# Patient Record
Sex: FEMALE | Race: WHITE | NOT HISPANIC OR LATINO | URBAN - METROPOLITAN AREA
[De-identification: names, ages, dates, MRNs, and addresses within clinical notes are randomized per-mention and may not be internally consistent; named-entity substitution may affect disease eponyms.]

---

## 2018-12-05 PROBLEM — R50.9 FEVER: Status: ACTIVE | Noted: 2018-12-05

## 2018-12-05 NOTE — PLAN OF CARE
[facility] Patient Flow Center Transfer Acceptance Note      Transferring Facility/Hospital: [facility]       Referring Provider/Specialty giving report:   12/4 - Dr. Malik Varghese  - [facility] med  12/5 - Dr. Kimbrough - [facility] med   12/ 5 - Dr. Baptiste - general surgery      Accepting Physician for admission to hospital: Ricardo Cha MD    Date of acceptance:  12/5/2018    Patients name: Silvia Ornelas       Allergies:Review of patient's allergies indicates:  Allergies not on file     Reason for transfer:  Higher level of care - ID and Gen Surg consultations     Overview/ Report from Physician/Mid-Level Provider:    HPI:  69 y/o Woman, Dementia vs MR?  Nursing Home resident, past hx of multiple abdominal surgeries.   Patient presented on 11/18 with small bowel obstruction, treated conservatively, multiple abdominal surgeries with hx of adhesions, had NG tube tube placed and reported that bowel obstruction resolved with conservative measures.     Hospital stay further complicated when patient began spiking very high fevers, there was concern for Aspiration Pneumonia and patient was on Linezolid, Cefepime.  With fevers up to 106, with accompanied rigidity, patient moved to ICU and [facility] care MDs diagnosed pt with serotonin syndrome, precipitated by use of Linezolid, Dilaudid and Fentanyl together.  Patient was treated with Dantrolene and defervesced with improvement in MSK symptoms and mental status.  She transferred out of ICU on 12/1-2.     Dr. Varghese reports that in the ICU patient's antibiotics switched to vancomycin and cefepime for aspiration pneumonitis, WBC was downtrending, and both antibiotics stopped on 12/3 after completed of 6-7 day antibiotic course.  Patient noted to be alert, conversant and oriented to self and with WBC downtrending, antibiotics stopped.     Overnight on 12/4 patient had resumption of fevers 101-102, features again of rigidit? Mental status change minimally verbal  "response and is  ill appearing and diaphoretic.  Medications reviewed with pharmacy to ensure no active medications might be precipitating Serotonin Syndrome.   In discussion of case decision made to watch patient overnight and re-eval on 12/5.  Requested that patient have CT head, LP, and alvarez catheter exchange (for candida positive Urine cultures)     With patients multiple fevers, all blood cultures have been no growth.  Patient has a alvarez catheter placed in hospital, no reported central line.  NG tube was removed recently.     Palliative care consulted and since signed off, but in discussion with family they want everything done     12/5 Update   Repeat blood and urine cultures sent, her Vancomycin & Cefepime restarted, also on Diflucan.  Patient remains persistently tachycardic and with fevers. 12/5 - LP completed this morning.  CT Head completed, no acute findings. Bacterial Antigen CSF - haeomophilus, strep pneumo, Neisseria meningitidis, E.coli negative.   Additional CSF studies are pending.       Additional Issues   Pt has severe oropharyngeal dysphagia, failed 2 Modified barium, failed again yesterday, recs for non-oral feeding.  GI tried to place a peg 1 year ago, could not placed,  IR would not want to place J-tube.  Gen Surgeon, has had 2-3 abdominal surgeries for peritonitis, do not want to operate due to complex anatomy.  NG tube in for prolonged period and removed for concern of nasopharyngeal erosions.     12/5 - Update_Dr. Baptiste General Surgery   I spoke with on call surgeon who knows patient's surgical history.  He states that he would not operate on the patient because she has a "hostile abdomen."  In 2014 patient had a volvulus with surgery performed by one of his partners with lysis of adhesions and placement of a G-tube.  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  In June 2016 a PEG " tube was placed, but the tube went into the transverse colon and due to perforated viscus had a second bout of peritonitis that required Exlap, lysis of adhesions, partial colectomy of the transvere and descending colon, a jejunostomy tube placed during surgery.  Jejunostomy tube has since been removed.   No abdominal surgeries in the last 2 years.  He believes any further open abdominal surgeries the patient may not survive, he feels a jejunostomy could be attempted again as she should have enough small bowel located to anterior abdominal wall to try procedure.       VS: 12/4 1400 vs - 110  144/82   RR 21  O2 99 on 3L  T 101.   12/5 vs - T 102  /72        Labs:  CBC 12/1 - 28,  12/4 - 13.5  H/H 8/27  plt 328   Na 153 K 3.6  cl 107  bUn 16 /0.91  Glu 91   Ca 9.5  Mg 1.5   Alb 3.5,  ASt 23 (11/28)  ALT 24(11/29)   Procalc 0.39,   (11/18 0.242,  11/28)   ESR -   CRP - 50.4 (12/2)   Blood Culture - no growth     urine Culture - candida   -patient has alvarez catheter placed in hospital.     Blood and Urine Cx pending.     Radiographs:   Head CT 11/18,  12/4  - no acute abnormalities reported.     CT Abd/Pelvis 11/18     CT Abd/Pelvis 11/25   -herniation of omentum to abdominal wall outpouching, RLQ ostomy, bibasilar atelectasis        To Do List upon arrival:        1. Consult Infectious Diseases - given recurrent fevers   >Add empiric anaerobic coverage  2. Consult General Surgery -    >upload OSH CT Imaging for consultant review  3. Will need PT/OT/SLP consults given prolonged hospitalization  4. F/u with OSH Lab regarding CSF study results that were obtained today   5. Consider Chest CT given initial concerns for aspiration pneumonia, if progressive aspiration may be etiology of her fevers.   6. Repeat UA & Urine culture - Have requested OSH to exchange alvarez catheter given positive Candida on Urine cultures.    7. Send ESR/CRP/Lactic Acid   8. If patient still hypernatremic start 1/2NS vs D5 infusion for  correction  9. If no safe way to place or provide enteral nutrition, repeat Palliative care consultation indicated given patients chronic life limiting illness.          Please call extension [phone] (if nobody answers, this will flip to a beeper, so put in your call back number) upon patient arrival to floor for Hospital Medicine admit team assignment and for additional admit orders for the patient.  Do not page the attending, staff physician associate with the patient on arrival (may not be in-house at the time of arrival).  Rather, always call [phone] to reach the triage physician for orders and team assignment.         Ricardo Cha M.D.  Attending Physician  Hospital Medicine Dept.  Pager: [phone]

## 2018-12-06 ENCOUNTER — HOSPITAL ENCOUNTER (INPATIENT)
Facility: HOSPITAL | Age: 68
LOS: 12 days | Discharge: NURSING, REHAB OR LONG-TERM CARE | DRG: 853 | End: 2018-12-18
Attending: HOSPITALIST | Admitting: HOSPITALIST
Payer: MEDICARE

## 2018-12-06 DIAGNOSIS — R50.9 FEVER, UNSPECIFIED FEVER CAUSE: ICD-10-CM

## 2018-12-06 DIAGNOSIS — R50.9 FEVER: ICD-10-CM

## 2018-12-06 DIAGNOSIS — F03.90 DEMENTIA WITHOUT BEHAVIORAL DISTURBANCE, UNSPECIFIED DEMENTIA TYPE: ICD-10-CM

## 2018-12-06 DIAGNOSIS — R13.11 ORAL PHASE DYSPHAGIA: ICD-10-CM

## 2018-12-06 DIAGNOSIS — E87.5 HYPERKALEMIA: ICD-10-CM

## 2018-12-06 DIAGNOSIS — F03.90 DEMENTIA WITHOUT BEHAVIORAL DISTURBANCE: ICD-10-CM

## 2018-12-06 DIAGNOSIS — R00.0 TACHYCARDIA: ICD-10-CM

## 2018-12-06 PROBLEM — R13.10 DYSPHAGIA: Status: ACTIVE | Noted: 2018-12-06

## 2018-12-06 PROBLEM — E87.0 HYPERNATREMIA: Status: ACTIVE | Noted: 2018-12-06

## 2018-12-06 PROBLEM — I10 ESSENTIAL HYPERTENSION: Status: ACTIVE | Noted: 2018-12-06

## 2018-12-06 PROBLEM — E03.9 HYPOTHYROIDISM: Status: ACTIVE | Noted: 2018-12-06

## 2018-12-06 PROBLEM — K21.9 GERD (GASTROESOPHAGEAL REFLUX DISEASE): Status: ACTIVE | Noted: 2018-12-06

## 2018-12-06 PROBLEM — I48.91 ATRIAL FIBRILLATION: Status: ACTIVE | Noted: 2018-12-06

## 2018-12-06 PROBLEM — E78.5 HLD (HYPERLIPIDEMIA): Status: ACTIVE | Noted: 2018-12-06

## 2018-12-06 LAB
ALBUMIN SERPL BCP-MCNC: 2.9 G/DL
ALP SERPL-CCNC: 104 U/L
ALT SERPL W/O P-5'-P-CCNC: 30 U/L
AMPHET+METHAMPHET UR QL: NEGATIVE
ANION GAP SERPL CALC-SCNC: 16 MMOL/L
ANION GAP SERPL CALC-SCNC: 18 MMOL/L
AST SERPL-CCNC: 20 U/L
BACTERIA #/AREA URNS AUTO: ABNORMAL /HPF
BARBITURATES UR QL SCN>200 NG/ML: NEGATIVE
BASOPHILS # BLD AUTO: 0.05 K/UL
BASOPHILS NFR BLD: 0.3 %
BENZODIAZ UR QL SCN>200 NG/ML: NEGATIVE
BILIRUB SERPL-MCNC: 0.4 MG/DL
BILIRUB UR QL STRIP: NEGATIVE
BNP SERPL-MCNC: 109 PG/ML
BUN SERPL-MCNC: 17 MG/DL
BUN SERPL-MCNC: 18 MG/DL
BZE UR QL SCN: NEGATIVE
CALCIUM SERPL-MCNC: 9.1 MG/DL
CALCIUM SERPL-MCNC: 9.6 MG/DL
CANNABINOIDS UR QL SCN: NEGATIVE
CHLORIDE SERPL-SCNC: 112 MMOL/L
CHLORIDE SERPL-SCNC: 116 MMOL/L
CLARITY UR REFRACT.AUTO: CLEAR
CO2 SERPL-SCNC: 22 MMOL/L
CO2 SERPL-SCNC: 24 MMOL/L
COLOR UR AUTO: YELLOW
CORTIS SERPL-MCNC: 23.3 UG/DL
CREAT SERPL-MCNC: 0.8 MG/DL
CREAT SERPL-MCNC: 0.8 MG/DL
CREAT UR-MCNC: 36 MG/DL
CRP SERPL-MCNC: 159.9 MG/L
DIFFERENTIAL METHOD: ABNORMAL
EOSINOPHIL # BLD AUTO: 0.1 K/UL
EOSINOPHIL NFR BLD: 0.3 %
ERYTHROCYTE [DISTWIDTH] IN BLOOD BY AUTOMATED COUNT: 16.8 %
ERYTHROCYTE [SEDIMENTATION RATE] IN BLOOD BY WESTERGREN METHOD: 113 MM/HR
EST. GFR  (AFRICAN AMERICAN): >60 ML/MIN/1.73 M^2
EST. GFR  (AFRICAN AMERICAN): >60 ML/MIN/1.73 M^2
EST. GFR  (NON AFRICAN AMERICAN): >60 ML/MIN/1.73 M^2
EST. GFR  (NON AFRICAN AMERICAN): >60 ML/MIN/1.73 M^2
ESTIMATED AVG GLUCOSE: 114 MG/DL
ETHANOL UR-MCNC: <10 MG/DL
GLUCOSE SERPL-MCNC: 79 MG/DL
GLUCOSE SERPL-MCNC: 97 MG/DL
GLUCOSE UR QL STRIP: ABNORMAL
HBA1C MFR BLD HPLC: 5.6 %
HCT VFR BLD AUTO: 27.3 %
HGB BLD-MCNC: 8.6 G/DL
HGB UR QL STRIP: ABNORMAL
HYALINE CASTS UR QL AUTO: 0 /LPF
IMM GRANULOCYTES # BLD AUTO: 0.11 K/UL
IMM GRANULOCYTES NFR BLD AUTO: 0.6 %
INR PPP: 1
KETONES UR QL STRIP: ABNORMAL
LACTATE SERPL-SCNC: 1.2 MMOL/L
LACTATE SERPL-SCNC: 2.4 MMOL/L
LEUKOCYTE ESTERASE UR QL STRIP: ABNORMAL
LIPASE SERPL-CCNC: 26 U/L
LYMPHOCYTES # BLD AUTO: 2 K/UL
LYMPHOCYTES NFR BLD: 10.9 %
MAGNESIUM SERPL-MCNC: 1.6 MG/DL
MCH RBC QN AUTO: 30.6 PG
MCHC RBC AUTO-ENTMCNC: 31.5 G/DL
MCV RBC AUTO: 97 FL
METHADONE UR QL SCN>300 NG/ML: NEGATIVE
MICROSCOPIC COMMENT: ABNORMAL
MONOCYTES # BLD AUTO: 1.3 K/UL
MONOCYTES NFR BLD: 7.5 %
NEUTROPHILS # BLD AUTO: 14.3 K/UL
NEUTROPHILS NFR BLD: 80.4 %
NITRITE UR QL STRIP: NEGATIVE
NRBC BLD-RTO: 0 /100 WBC
OPIATES UR QL SCN: NEGATIVE
PCP UR QL SCN>25 NG/ML: NEGATIVE
PH UR STRIP: 5 [PH] (ref 5–8)
PHOSPHATE SERPL-MCNC: 2.6 MG/DL
PLATELET # BLD AUTO: 364 K/UL
PMV BLD AUTO: 12.1 FL
POCT GLUCOSE: 91 MG/DL (ref 70–110)
POTASSIUM SERPL-SCNC: 3.4 MMOL/L
POTASSIUM SERPL-SCNC: 3.6 MMOL/L
PROCALCITONIN SERPL IA-MCNC: 0.3 NG/ML
PROT SERPL-MCNC: 7.5 G/DL
PROT UR QL STRIP: ABNORMAL
PROTHROMBIN TIME: 10.7 SEC
RBC # BLD AUTO: 2.81 M/UL
RBC #/AREA URNS AUTO: 71 /HPF (ref 0–4)
SODIUM SERPL-SCNC: 154 MMOL/L
SODIUM SERPL-SCNC: 154 MMOL/L
SP GR UR STRIP: 1.01 (ref 1–1.03)
SQUAMOUS #/AREA URNS AUTO: 1 /HPF
TOXICOLOGY INFORMATION: NORMAL
TSH SERPL DL<=0.005 MIU/L-ACNC: 2.86 UIU/ML
URN SPEC COLLECT METH UR: ABNORMAL
WBC # BLD AUTO: 17.81 K/UL
WBC #/AREA URNS AUTO: 4 /HPF (ref 0–5)
YEAST UR QL AUTO: ABNORMAL

## 2018-12-06 PROCEDURE — 99291 CRITICAL CARE FIRST HOUR: CPT | Mod: ,,, | Performed by: NURSE PRACTITIONER

## 2018-12-06 PROCEDURE — G8996 SWALLOW CURRENT STATUS: HCPCS | Mod: CN

## 2018-12-06 PROCEDURE — 84443 ASSAY THYROID STIM HORMONE: CPT

## 2018-12-06 PROCEDURE — 83690 ASSAY OF LIPASE: CPT

## 2018-12-06 PROCEDURE — 82024 ASSAY OF ACTH: CPT

## 2018-12-06 PROCEDURE — 99223 PR INITIAL HOSPITAL CARE,LEVL III: ICD-10-PCS | Mod: AI,GC,, | Performed by: STUDENT IN AN ORGANIZED HEALTH CARE EDUCATION/TRAINING PROGRAM

## 2018-12-06 PROCEDURE — 63600175 PHARM REV CODE 636 W HCPCS: Performed by: STUDENT IN AN ORGANIZED HEALTH CARE EDUCATION/TRAINING PROGRAM

## 2018-12-06 PROCEDURE — 80048 BASIC METABOLIC PNL TOTAL CA: CPT

## 2018-12-06 PROCEDURE — 85652 RBC SED RATE AUTOMATED: CPT

## 2018-12-06 PROCEDURE — 83036 HEMOGLOBIN GLYCOSYLATED A1C: CPT

## 2018-12-06 PROCEDURE — 20600001 HC STEP DOWN PRIVATE ROOM

## 2018-12-06 PROCEDURE — 80053 COMPREHEN METABOLIC PANEL: CPT

## 2018-12-06 PROCEDURE — 99223 1ST HOSP IP/OBS HIGH 75: CPT | Mod: AI,GC,, | Performed by: STUDENT IN AN ORGANIZED HEALTH CARE EDUCATION/TRAINING PROGRAM

## 2018-12-06 PROCEDURE — 93010 EKG 12-LEAD: ICD-10-PCS | Mod: ,,, | Performed by: INTERNAL MEDICINE

## 2018-12-06 PROCEDURE — G8997 SWALLOW GOAL STATUS: HCPCS | Mod: CL

## 2018-12-06 PROCEDURE — 84100 ASSAY OF PHOSPHORUS: CPT

## 2018-12-06 PROCEDURE — 82533 TOTAL CORTISOL: CPT

## 2018-12-06 PROCEDURE — 87040 BLOOD CULTURE FOR BACTERIA: CPT | Mod: 59

## 2018-12-06 PROCEDURE — 93005 ELECTROCARDIOGRAM TRACING: CPT

## 2018-12-06 PROCEDURE — 86140 C-REACTIVE PROTEIN: CPT

## 2018-12-06 PROCEDURE — 81001 URINALYSIS AUTO W/SCOPE: CPT

## 2018-12-06 PROCEDURE — 85025 COMPLETE CBC W/AUTO DIFF WBC: CPT

## 2018-12-06 PROCEDURE — 93010 ELECTROCARDIOGRAM REPORT: CPT | Mod: ,,, | Performed by: INTERNAL MEDICINE

## 2018-12-06 PROCEDURE — 25000003 PHARM REV CODE 250: Performed by: STUDENT IN AN ORGANIZED HEALTH CARE EDUCATION/TRAINING PROGRAM

## 2018-12-06 PROCEDURE — 83735 ASSAY OF MAGNESIUM: CPT

## 2018-12-06 PROCEDURE — 83605 ASSAY OF LACTIC ACID: CPT

## 2018-12-06 PROCEDURE — 84145 PROCALCITONIN (PCT): CPT

## 2018-12-06 PROCEDURE — 83880 ASSAY OF NATRIURETIC PEPTIDE: CPT

## 2018-12-06 PROCEDURE — 85610 PROTHROMBIN TIME: CPT

## 2018-12-06 PROCEDURE — 92610 EVALUATE SWALLOWING FUNCTION: CPT

## 2018-12-06 PROCEDURE — 80307 DRUG TEST PRSMV CHEM ANLYZR: CPT

## 2018-12-06 PROCEDURE — 99291 PR CRITICAL CARE, E/M 30-74 MINUTES: ICD-10-PCS | Mod: ,,, | Performed by: NURSE PRACTITIONER

## 2018-12-06 RX ORDER — POLYETHYLENE GLYCOL 3350 17 G/17G
17 POWDER, FOR SOLUTION ORAL DAILY
Status: DISCONTINUED | OUTPATIENT
Start: 2018-12-06 | End: 2018-12-06

## 2018-12-06 RX ORDER — RIVASTIGMINE 9.5 MG/24H
1 PATCH, EXTENDED RELEASE TRANSDERMAL DAILY
Status: ON HOLD | COMMUNITY
End: 2018-12-17 | Stop reason: HOSPADM

## 2018-12-06 RX ORDER — LEVOTHYROXINE SODIUM 25 UG/1
25 TABLET ORAL DAILY
COMMUNITY

## 2018-12-06 RX ORDER — NAPROXEN SODIUM 220 MG/1
81 TABLET, FILM COATED ORAL DAILY
Status: DISCONTINUED | OUTPATIENT
Start: 2018-12-06 | End: 2018-12-06

## 2018-12-06 RX ORDER — BISOPROLOL FUMARATE 5 MG/1
2.5 TABLET, FILM COATED ORAL 2 TIMES DAILY
Status: ON HOLD | COMMUNITY
End: 2018-12-17 | Stop reason: HOSPADM

## 2018-12-06 RX ORDER — VANCOMYCIN HCL IN 5 % DEXTROSE 1G/250ML
1000 PLASTIC BAG, INJECTION (ML) INTRAVENOUS
Status: DISCONTINUED | OUTPATIENT
Start: 2018-12-06 | End: 2018-12-07

## 2018-12-06 RX ORDER — POLYETHYLENE GLYCOL 3350 17 G/17G
17 POWDER, FOR SOLUTION ORAL 2 TIMES DAILY PRN
Status: DISCONTINUED | OUTPATIENT
Start: 2018-12-06 | End: 2018-12-10

## 2018-12-06 RX ORDER — DONEPEZIL HYDROCHLORIDE 10 MG/1
10 TABLET, FILM COATED ORAL NIGHTLY
COMMUNITY

## 2018-12-06 RX ORDER — BISOPROLOL FUMARATE 5 MG/1
5 TABLET, FILM COATED ORAL DAILY
Status: DISCONTINUED | OUTPATIENT
Start: 2018-12-06 | End: 2018-12-06

## 2018-12-06 RX ORDER — NAPROXEN SODIUM 220 MG/1
81 TABLET, FILM COATED ORAL DAILY
COMMUNITY

## 2018-12-06 RX ORDER — RISPERIDONE 1 MG/1
1 TABLET ORAL 2 TIMES DAILY
Status: ON HOLD | COMMUNITY
End: 2018-12-17 | Stop reason: HOSPADM

## 2018-12-06 RX ORDER — DONEPEZIL HYDROCHLORIDE 5 MG/1
10 TABLET, FILM COATED ORAL NIGHTLY
Status: DISCONTINUED | OUTPATIENT
Start: 2018-12-06 | End: 2018-12-06

## 2018-12-06 RX ORDER — ONDANSETRON 8 MG/1
8 TABLET, ORALLY DISINTEGRATING ORAL EVERY 8 HOURS PRN
Status: DISCONTINUED | OUTPATIENT
Start: 2018-12-06 | End: 2018-12-18 | Stop reason: HOSPADM

## 2018-12-06 RX ORDER — SODIUM CHLORIDE 0.9 % (FLUSH) 0.9 %
5 SYRINGE (ML) INJECTION
Status: DISCONTINUED | OUTPATIENT
Start: 2018-12-06 | End: 2018-12-18 | Stop reason: HOSPADM

## 2018-12-06 RX ORDER — DEXTROSE MONOHYDRATE 50 MG/ML
INJECTION, SOLUTION INTRAVENOUS CONTINUOUS
Status: ACTIVE | OUTPATIENT
Start: 2018-12-06 | End: 2018-12-07

## 2018-12-06 RX ORDER — IBUPROFEN 200 MG
24 TABLET ORAL
Status: DISCONTINUED | OUTPATIENT
Start: 2018-12-06 | End: 2018-12-18 | Stop reason: HOSPADM

## 2018-12-06 RX ORDER — PANTOPRAZOLE SODIUM 40 MG/1
40 TABLET, DELAYED RELEASE ORAL DAILY
Status: DISCONTINUED | OUTPATIENT
Start: 2018-12-06 | End: 2018-12-06

## 2018-12-06 RX ORDER — IBUPROFEN 200 MG
16 TABLET ORAL
Status: DISCONTINUED | OUTPATIENT
Start: 2018-12-06 | End: 2018-12-18 | Stop reason: HOSPADM

## 2018-12-06 RX ORDER — GLUCAGON 1 MG
1 KIT INJECTION
Status: DISCONTINUED | OUTPATIENT
Start: 2018-12-06 | End: 2018-12-18 | Stop reason: HOSPADM

## 2018-12-06 RX ORDER — LEVOTHYROXINE SODIUM 25 UG/1
25 TABLET ORAL DAILY
Status: DISCONTINUED | OUTPATIENT
Start: 2018-12-06 | End: 2018-12-06

## 2018-12-06 RX ORDER — ATORVASTATIN CALCIUM 10 MG/1
10 TABLET, FILM COATED ORAL DAILY
Status: DISCONTINUED | OUTPATIENT
Start: 2018-12-06 | End: 2018-12-06

## 2018-12-06 RX ORDER — PANTOPRAZOLE SODIUM 40 MG/1
40 TABLET, DELAYED RELEASE ORAL DAILY
COMMUNITY

## 2018-12-06 RX ADMIN — SODIUM CHLORIDE 1000 ML: 0.9 INJECTION, SOLUTION INTRAVENOUS at 06:12

## 2018-12-06 RX ADMIN — DEXTROSE: 5 SOLUTION INTRAVENOUS at 07:12

## 2018-12-06 RX ADMIN — Medication 4.5 G: at 11:12

## 2018-12-06 RX ADMIN — VANCOMYCIN HYDROCHLORIDE 1000 MG: 1 INJECTION, POWDER, LYOPHILIZED, FOR SOLUTION INTRAVENOUS at 09:12

## 2018-12-06 NOTE — ASSESSMENT & PLAN NOTE
· Noted to have severe oropharyngeal dysphagia.   · Failed 2 Modified barium swallows, failed again on 12/5; OSH recommendations were for non-oral feeding.  · She initially had J-tube after last abdominal surgery, but fell out without attempt on repair.   · GI tried to place a PEG 1 year ago; but was unable.   · IR would not want to place J-tube.   · General surgery at [facility] declined to operate secondary to complex anatomy.   · NG tube was in for bulk of hospitalization for decompression and then tube feedings, but removed for concern of nasopharyngeal erosions.   · Speech and swallow evaluation ordered.   · Will consult general surgery for possible PEG placement here.

## 2018-12-06 NOTE — SIGNIFICANT EVENT
Artificial Intelligence Notificaton      Admit Date: 2018  LOS: 0  Code Status: Full Code   Date of Consult: 2018  : 1950  Age: 68 y.o.  Weight:   Wt Readings from Last 1 Encounters:   18 55.5 kg (122 lb 5.7 oz)     Sex: female  [facility]:   MRN: [id number]  Attending Physician: Humberto Kingston MD  Primary Service: [facility] MED 5  Time AI Alert Received: 14:48 pm  Time at Bedside: 15:10 pm           Patient found in bed, easily aroused. Oriented, but appears to be at her baseline dementia.  Consult to Critical Medicine placed just after patient was evaluated. Please see full consult note for patient evaluation.       Vital Signs (Most Recent):  Temp: 99.4 °F (37.4 °C) (18 161)  Pulse: (!) 142(informed  nurse ) (18 161)  Resp: 18 (18 161)  BP: (!) 155/82 (18 161)  SpO2: (!) 92 % (18 161) Vital Signs (24h Range):  Temp:  [97.7 °F (36.5 °C)-99.4 °F (37.4 °C)] 99.4 °F (37.4 °C)  Pulse:  [] 142  Resp:  [18] 18  SpO2:  [92 %] 92 %  BP: (137-155)/(74-82) 155/82         This is an  Artificial Intelligence Notification.     Artificial Intelligence alert discussed with Primary team:     Please place a Critical Care consult if evaluation/consultation is needed  The Critical Care Fellow can be reached at x [phone]

## 2018-12-06 NOTE — SUBJECTIVE & OBJECTIVE
Past Medical History:   Diagnosis Date    Atrial fibrillation     Dementia     GERD (gastroesophageal reflux disease)     HLD (hyperlipidemia)     Hypertension     Hypothyroid     Thyroid disease        Past Surgical History:   Procedure Laterality Date    ABDOMINAL SURGERY      EXPLORATORY LAPAROTOMY W/ BOWEL RESECTION      ILEOSTOMY      PARTIAL GASTRECTOMY      SPLENECTOMY, TOTAL         Review of patient's allergies indicates:   Allergen Reactions    Sulfa (sulfonamide antibiotics) Other (See Comments)     Severe reaction type unknown        No current facility-administered medications on file prior to encounter.      Current Outpatient Medications on File Prior to Encounter   Medication Sig    aspirin 81 MG Chew Take 81 mg by mouth once daily.    atorvastatin (LIPITOR) 10 MG tablet Take 10 mg by mouth once daily.    bisoprolol (ZEBETA) 5 MG tablet Take 5 mg by mouth once daily.    donepezil (ARICEPT) 10 MG tablet Take 10 mg by mouth every evening.    levothyroxine (SYNTHROID) 25 MCG tablet Take 25 mcg by mouth once daily.    pantoprazole (PROTONIX) 40 MG tablet Take 40 mg by mouth once daily.    risperiDONE (RISPERDAL) 1 MG tablet Take 1 mg by mouth 2 (two) times daily.    rivastigmine (EXELON) 9.5 mg/24 hr PT24 Place 1 patch onto the skin once daily.     Family History     Family history is unknown by patient.        Tobacco Use    Smoking status: Never Smoker    Smokeless tobacco: Never Used   Substance and Sexual Activity    Alcohol use: No     Frequency: Never    Drug use: No    Sexual activity: Not Currently     Review of Systems   Unable to perform ROS: Dementia     Objective:     Vital Signs (Most Recent):  Temp: 97.7 °F (36.5 °C) (12/06/18 1120)  Pulse: 76 (12/06/18 1120)  Resp: 18 (12/06/18 1120)  BP: 137/74 (12/06/18 1120) Vital Signs (24h Range):  Temp:  [97.7 °F (36.5 °C)] 97.7 °F (36.5 °C)  Pulse:  [76] 76  Resp:  [18] 18  BP: (137)/(74) 137/74        There is no height or  weight on file to calculate BMI.    Physical Exam   Constitutional: Vital signs are normal. No distress.   HENT:   Head: Normocephalic and atraumatic.   Mouth/Throat: Uvula is midline and oropharynx is clear and moist. Mucous membranes are dry.   Eyes: Conjunctivae are normal. Pupils are equal, round, and reactive to light. No scleral icterus.   Neck: Neck supple. No JVD present.   Cardiovascular: Normal rate, regular rhythm, normal heart sounds and normal pulses.   No murmur heard.  Pulmonary/Chest: Effort normal and breath sounds normal. No respiratory distress. She has no decreased breath sounds. She has no wheezes. She has no rhonchi.   Abdominal: Soft. Normal appearance and bowel sounds are normal. She exhibits no distension and no ascites. There is no tenderness.   Colostomy bag in RLQ. Multiple well-healed surgical scars.    Musculoskeletal:   There is no swelling of the lower extremities.    Neurological: She is alert. No cranial nerve deficit. GCS eye subscore is 4. GCS verbal subscore is 3. GCS motor subscore is 6. She displays no Babinski's sign on the right side. She displays no Babinski's sign on the left side.   There is contracture of bilateral feet and upper extremities at the elbows.     Skin: Skin is warm and dry. No bruising and no rash noted.         CRANIAL NERVES     CN III, IV, VI   Pupils are equal, round, and reactive to light.       Significant Labs:   A1C: No results for input(s): HGBA1C in the last 4320 hours.  Blood Culture: No results for input(s): LABBLOO in the last 48 hours.  CBC:   Recent Labs   Lab 12/06/18  1255   WBC 17.81*   HGB 8.6*   HCT 27.3*   *     CMP: No results for input(s): NA, K, CL, CO2, GLU, BUN, CREATININE, CALCIUM, PROT, ALBUMIN, BILITOT, ALKPHOS, AST, ALT, ANIONGAP, EGFRNONAA in the last 48 hours.    Invalid input(s): ESTGFAFRICA  Lactic Acid:   Recent Labs   Lab 12/06/18  1255   LACTATE 2.4*     Magnesium: No results for input(s): MG in the last 48  hours.  TSH: No results for input(s): TSH in the last 4320 hours.  Urine Culture: No results for input(s): LABURIN in the last 48 hours.  Urine Studies: No results for input(s): COLORU, APPEARANCEUA, PHUR, SPECGRAV, PROTEINUA, GLUCUA, KETONESU, BILIRUBINUA, OCCULTUA, NITRITE, UROBILINOGEN, LEUKOCYTESUR, RBCUA, WBCUA, BACTERIA, SQUAMEPITHEL, HYALINECASTS in the last 48 hours.    Invalid input(s): WRIGHTSUR    Significant Imaging: CXR: I have reviewed all pertinent results/findings within the past 24 hours and my personal findings are:  Right-sided PICC line in the SVC.  Heart size normal.  Mild opacification in the right upper lobe area.  The lungs are otherwise clear.  No pleural effusion

## 2018-12-06 NOTE — ASSESSMENT & PLAN NOTE
· Baseline status unknown. Will speak with family members for collateral.   · Patient was noted to be awake, alert, and conversing throughout hospital stay at OSH after resolution of possible serotonin syndrome in ICU.   · Noted to have mental status change associated with minimal verbal response after redevelopment of fevers.   · Noted to be awake and alert, but with disordered response to questions on exam today.   · Cannot resume home dementia medications until swallow evaluation or possible repeat NG vs PEG.

## 2018-12-06 NOTE — ASSESSMENT & PLAN NOTE
· Sodium was only 145 on admission on 11/18.   · Noted to have progressively increasing Na at OSH up to 153 prior to discharge. Could be explanation for patient's acute mental status change.   · Repeat here 154.   · Evaluated by nephrology there; treated on and off with D5NS and free water, but without consistent correction.   · Will consult ICU for hypernatremia correction given likely need for closely followed sodium levels vs mental status changes.

## 2018-12-06 NOTE — HPI
"This is a 68 year old female transferred from [facility] for fever, requesting ID and general surgery consultations. She has a PMH of severe dementia (baseline status unknown), Afib (rate controlled, not on anticoagulation), HTN, HLD, GERD, and hypothyroidism. 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     History obtained form OSH paper chart review, secondary to patient's mental status.     She was hospitalized at [facility] on 11/10/18 for suspected aspiration pneumonia; status post treatment with Vanc/Zosyn. She was noted to have abnormal barium swallow at that time; PEG tube placement again discussed with family; they declined. Discharged back to nursing home on 11/16.     She presented again on 11/18/18 for new onset of nausea and vomiting. On presentation, labs significant for BRODY (BUN 24, Cr 2.6). CT A/P showed high-grade small bowel obstruction and bilateral lower lobe consolidations. She was started on Linezolid and Cefepime for suspected bilateral aspiration pneumonia. Her SBO was managed by general surgery with conservative measures, including NG decompression, pain medications, and IVF.  She then began developing leukocytosis to 18, with new fevers (Tmax 101) on 11/23; 5 day course of Gentamicin was added on 11/22. On 11/26, she was noted to be passing gas and having bowel movements, but continued to have high NG bilious outptut. Repeat CT showed resolving SBO, but continued bilateral lower lobe consolidations. On " 11/27, she spiked a fever to 107 (rectal probe) and developed new onset of extremity rigidity, hyperreflexia, and fine tremor, associated with worsening leukocytosis to 28, and lactic of 3. She was assessed to have serotonin syndrome secondary vs NMS due to possible combination of Linezolid, Dilaudid, Fentanyl, and Zofran. She received 140 mg of Dantrolene for possible NMS with symptom improvement; Linezolid discontinued. Patient's antibiotics switched to vancomycin and cefepime for aspiration pneumonitis, WBC was downtrending, and both antibiotics stopped on 12/3 after completed of 6-7 day antibiotic course.  Patient noted to be alert, conversant and oriented to self and with WBC downtrending, antibiotics stopped. Step down from ICU.    Overnight on 12/4, she had resumption of fevers (Tmax 101-102) with again features again of rigidity. Mental status change declined again with minimal verbal response; patient noted to be ill appearing and diaphoretic.  Medications reviewed with pharmacy to ensure no active medications might be precipitating Serotonin Syndrome. CT head was unremarkable. Blood and urine cultures repeated. Hoover catheter exchanged. LP performed; gram stain negative, but other analyses pending. In discussion of case, decision made to watch patient overnight and re-eval on 12/5. On 12/5, her Vancomycin and Cefepime restarted, also on Diflucan for urine culture positive for Candida.     Additionally, she was noted to have severe oropharyngeal dysphagia. She failed 2 Modified barium swallows, failed again on 12/5; OSH recommendations were for non-oral feeding. She initially had J-tube after last abdominal surgery, but fell out without attempt on repair. GI tried to place a PEG 1 year ago; but was unable.  IR would not want to place J-tube. General surgery at [facility] declined to operate secondary to complex anatomy.  NG tube was in for bulk of hospitalization for decompression and then tube feedings, but  removed for concern of nasopharyngeal erosions.

## 2018-12-06 NOTE — ASSESSMENT & PLAN NOTE
· Rate controlled with Bisoprolol at home per outside records.   · CHADVASC score of 3; on no anticoagulation.

## 2018-12-06 NOTE — NURSING
Received update from YASMEEN Torres at sending facility. Pt waiting for [facility] for transport. Will update Terri (daughter) upon arrival.

## 2018-12-06 NOTE — ASSESSMENT & PLAN NOTE
· Controlled.   · Holding home BP medications before swallow evaluation.   · Vitals ordered Q4H.

## 2018-12-06 NOTE — PLAN OF CARE
Problem: SLP Goal  Goal: SLP Goal  Speech Language Pathology Goals  Goals expected to be met by 12/13:  1. Patient will participate in ongoing swallow assessment to determine least restrictive diet recommendations.     Bedside swallow study completed with implemented plan of care.   Emily P. Abadie M.S., CCC-SLP  Speech Language Pathologist  [phone]  12/06/2018

## 2018-12-06 NOTE — ASSESSMENT & PLAN NOTE
Per primary note patient has had progression in her demential over the past 2 years, worsening in the past 4 months. She was appears to be at her baseline mental status and without an Acute Encephalopathy

## 2018-12-06 NOTE — H&P
"[facility] Hospital Medicine  History & Physical    Patient Name: Silvia Ornelas  MRN: [id number]  Admission Date: 12/6/2018  Attending Physician: Humberto Kingston MD   Primary Care Provider: Primary Doctor [facility] Medicine Team: [facility] 5 Kelvin Mcadams MD     Patient information was obtained from past medical records.     Subjective:     Principal Problem:Fever    Chief Complaint:   Chief Complaint   Patient presents with    Fever        HPI: This is a 68 year old female transferred from [facility] for fever, requesting ID and general surgery consultations. She has a PMH of severe dementia (baseline status unknown), Afib (rate controlled, not on anticoagulation), HTN, HLD, GERD, and hypothyroidism. 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     History obtained form [facility] paper chart review, secondary to patient's mental status.     She was hospitalized at [facility] on 11/10/18 for suspected aspiration pneumonia; status post treatment with Vanc/Zosyn. She was noted to have abnormal barium swallow at that time; PEG tube placement again discussed with family; they declined. Discharged back to nursing home on 11/16.     She presented again on 11/18/18 for new onset of nausea and vomiting. On presentation, labs significant for BRODY (BUN 24, Cr 2.6). CT A/P showed high-grade small bowel obstruction and bilateral lower lobe consolidations. She was started on Linezolid and Cefepime for suspected bilateral aspiration " pneumonia. Her SBO was managed by general surgery with conservative measures, including NG decompression, pain medications, and IVF.  She then began developing leukocytosis to 18, with new fevers (Tmax 101) on 11/23; 5 day course of Gentamicin was added on 11/22. On 11/26, she was noted to be passing gas and having bowel movements, but continued to have high NG bilious outptut. Repeat CT showed resolving SBO, but continued bilateral lower lobe consolidations. On 11/27, she spiked a fever to 107 (rectal probe) and developed new onset of extremity rigidity, hyperreflexia, and fine tremor, associated with worsening leukocytosis to 28, and lactic of 3. She was assessed to have serotonin syndrome secondary vs NMS due to possible combination of Linezolid, Dilaudid, Fentanyl, and Zofran. She received 140 mg of Dantrolene for possible NMS with symptom improvement; Linezolid discontinued. Patient's antibiotics switched to vancomycin and cefepime for aspiration pneumonitis, WBC was downtrending, and both antibiotics stopped on 12/3 after completed of 6-7 day antibiotic course.  Patient noted to be alert, conversant and oriented to self and with WBC downtrending, antibiotics stopped. Step down from ICU.    Overnight on 12/4, she had resumption of fevers (Tmax 101-102) with again features again of rigidity. Mental status change declined again with minimal  verbal response; patient noted to be ill appearing and diaphoretic.  Medications reviewed with pharmacy to ensure no active medications might be precipitating Serotonin Syndrome. CT head was unremarkable. Blood and urine cultures repeated. Hoover catheter exchanged. LP performed; gram stain negative, but other analyses pending. In discussion of case, decision made to watch patient overnight and re-eval on 12/5. On 12/5, her Vancomycin and Cefepime restarted, also on Diflucan for urine culture positive for Candida.     Additionally, she was noted to have severe oropharyngeal  dysphagia. She failed 2 Modified barium swallows, failed again on 12/5; OSH recommendations were for non-oral feeding. She initially had J-tube after last abdominal surgery, but fell out without attempt on repair. GI tried to place a PEG 1 year ago; but was unable.  IR would not want to place J-tube. General surgery at [facility] declined to operate secondary to complex anatomy.  NG tube was in for bulk of hospitalization for decompression and then tube feedings, but removed for concern of nasopharyngeal erosions.      Past Medical History:   Diagnosis Date    Atrial fibrillation     Dementia     GERD (gastroesophageal reflux disease)     HLD (hyperlipidemia)     Hypertension     Hypothyroid     Thyroid disease        Past Surgical History:   Procedure Laterality Date    ABDOMINAL SURGERY      EXPLORATORY LAPAROTOMY W/ BOWEL RESECTION      ILEOSTOMY      PARTIAL GASTRECTOMY      SPLENECTOMY, TOTAL         Review of patient's allergies indicates:   Allergen Reactions    Sulfa (sulfonamide antibiotics) Other (See Comments)     Severe reaction type unknown        No current facility-administered medications on file prior to encounter.      Current Outpatient Medications on File Prior to Encounter   Medication Sig    aspirin 81 MG Chew Take 81 mg by mouth once daily.    atorvastatin (LIPITOR) 10 MG tablet Take 10 mg by mouth once daily.    bisoprolol (ZEBETA) 5 MG tablet Take 5 mg by mouth once daily.    donepezil (ARICEPT) 10 MG tablet Take 10 mg by mouth every evening.    levothyroxine (SYNTHROID) 25 MCG tablet Take 25 mcg by mouth once daily.    pantoprazole (PROTONIX) 40 MG tablet Take 40 mg by mouth once daily.    risperiDONE (RISPERDAL) 1 MG tablet Take 1 mg by mouth 2 (two) times daily.    rivastigmine (EXELON) 9.5 mg/24 hr PT24 Place 1 patch onto the skin once daily.     Family History     Family history is unknown by patient.        Tobacco Use    Smoking status: Never Smoker    Smokeless  tobacco: Never Used   Substance and Sexual Activity    Alcohol use: No     Frequency: Never    Drug use: No    Sexual activity: Not Currently     Review of Systems   Unable to perform ROS: Dementia     Objective:     Vital Signs (Most Recent):  Temp: 97.7 °F (36.5 °C) (12/06/18 1120)  Pulse: 76 (12/06/18 1120)  Resp: 18 (12/06/18 1120)  BP: 137/74 (12/06/18 1120) Vital Signs (24h Range):  Temp:  [97.7 °F (36.5 °C)] 97.7 °F (36.5 °C)  Pulse:  [76] 76  Resp:  [18] 18  BP: (137)/(74) 137/74        There is no height or weight on file to calculate BMI.    Physical Exam   Constitutional: Vital signs are normal. No distress.   HENT:   Head: Normocephalic and atraumatic.   Mouth/Throat: Uvula is midline and oropharynx is clear and moist. Mucous membranes are dry.   Eyes: Conjunctivae are normal. Pupils are equal, round, and reactive to light. No scleral icterus.   Neck: Neck supple. No JVD present.   Cardiovascular: Normal rate, regular rhythm, normal heart sounds and normal pulses.   No murmur heard.  Pulmonary/Chest: Effort normal and breath sounds normal. No respiratory distress. She has no decreased breath sounds. She has no wheezes. She has no rhonchi.   Abdominal: Soft. Normal appearance and bowel sounds are normal. She exhibits no distension and no ascites. There is no tenderness.   Colostomy bag in RLQ. Multiple well-healed surgical scars.    Musculoskeletal:   There is no swelling of the lower extremities.    Neurological: She is alert. No cranial nerve deficit. GCS eye subscore is 4. GCS verbal subscore is 3. GCS motor subscore is 6. She displays no Babinski's sign on the right side. She displays no Babinski's sign on the left side.   There is contracture of bilateral feet and upper extremities at the elbows.     Skin: Skin is warm and dry. No bruising and no rash noted.         CRANIAL NERVES     CN III, IV, VI   Pupils are equal, round, and reactive to light.       Significant Labs:   A1C: No results for  input(s): HGBA1C in the last 4320 hours.  Blood Culture: No results for input(s): LABBLOO in the last 48 hours.  CBC:   Recent Labs   Lab 12/06/18  1255   WBC 17.81*   HGB 8.6*   HCT 27.3*   *     CMP: No results for input(s): NA, K, CL, CO2, GLU, BUN, CREATININE, CALCIUM, PROT, ALBUMIN, BILITOT, ALKPHOS, AST, ALT, ANIONGAP, EGFRNONAA in the last 48 hours.    Invalid input(s): ESTGFAFRICA  Lactic Acid:   Recent Labs   Lab 12/06/18  1255   LACTATE 2.4*     Magnesium: No results for input(s): MG in the last 48 hours.  TSH: No results for input(s): TSH in the last 4320 hours.  Urine Culture: No results for input(s): LABURIN in the last 48 hours.  Urine Studies: No results for input(s): COLORU, APPEARANCEUA, PHUR, SPECGRAV, PROTEINUA, GLUCUA, KETONESU, BILIRUBINUA, OCCULTUA, NITRITE, UROBILINOGEN, LEUKOCYTESUR, RBCUA, WBCUA, BACTERIA, SQUAMEPITHEL, HYALINECASTS in the last 48 hours.    Invalid input(s): WRIGHTSUR    Significant Imaging: CXR: I have reviewed all pertinent results/findings within the past 24 hours and my personal findings are:  Right-sided PICC line in the SVC.  Heart size normal.  Mild opacification in the right upper lobe area.  The lungs are otherwise clear.  No pleural effusion    Assessment/Plan:     * Fever    · Differentials here include recurrent aspiration pneumonia secondary to dysphagia, bacteremia, and UTI.  · Admitted at OSH on 11/18 for high-grade SBO with recurrent bilateral pneumonia on CT scan on 11/26 at OSH.   · Hospital course at OSH complicated by symptoms of serotonin syndrome vs NMS with fever up to 107, status post Dantrolene.   · Status post multiple courses of broad spectrum coverage including Linezolid, Cefepime, and Gentamicin; restarted on 12/4 with Vancomycin, Cefepime, and Diflucan after re-developing fevers. Holding until labs here.  · Afebrile here on presentation. Last known fever 12/5 (100.6) per outside records.    · Repeat CXR ordered. Will consider additional  chest imaging.    · Repeat CBC, blood and urine cultures ordered.   · UA repeated.   · Will likely consult ID given persistent fevers without source status post multiple broad spectrum antibiotics.          Hypernatremia    · Sodium was only 145 on admission on 11/18.   · Noted to have progressively increasing Na at OSH up to 153 prior to discharge. Could be explanation for patient's acute mental status change.   · Repeat here 154.   · Evaluated by nephrology there; treated on and off with D5NS and free water, but without consistent correction.   · Will consult ICU for hypernatremia correction given likely need for closely followed sodium levels vs mental status changes.        Dysphagia    · Noted to have severe oropharyngeal dysphagia.   · Failed 2 Modified barium swallows, failed again on 12/5; OSH recommendations were for non-oral feeding.  · She initially had J-tube after last abdominal surgery, but fell out without attempt on repair.   · GI tried to place a PEG 1 year ago; but was unable.   · IR would not want to place J-tube.   · General surgery at [facility] declined to operate secondary to complex anatomy.   · NG tube was in for bulk of hospitalization for decompression and then tube feedings, but removed for concern of nasopharyngeal erosions.   · Speech and swallow evaluation ordered.   · Will consult general surgery for possible PEG placement here.        Hypothyroidism    · On Synthroid at home.   · Holding until speech evaluation.        Atrial fibrillation    · Rate controlled with Bisoprolol at home per outside records.   · CHADVASC score of 3; on no anticoagulation.            Dementia without behavioral disturbance    · Baseline status unknown. Will speak with family members for collateral.   · Patient was noted to be awake, alert, and conversing throughout hospital stay at OSH after resolution of possible serotonin syndrome in ICU.   · Noted to have mental status change associated with minimal  verbal response  after redevelopment of fevers.   · Noted to be awake and alert, but with disordered response to questions on exam today.   · Cannot resume home dementia medications until swallow evaluation or possible repeat NG vs PEG.          GERD (gastroesophageal reflux disease)    · Holding home PPI before swallow evaluation.        HLD (hyperlipidemia)    · No prior lipid panel in system.   · Holding home Atorvastatin before swallow evaluation.        Essential hypertension    · Controlled.   · Holding home BP medications before swallow evaluation.   · Vitals ordered Q4H.          VTE Risk Mitigation (From admission, onward)        Ordered     Place sequential compression device  Until discontinued      12/06/18 1138     IP VTE LOW RISK PATIENT  Once      12/06/18 1138             Kelvin Mcadams MD  Department of Hospital Medicine   [facility]

## 2018-12-06 NOTE — CODE DOCUMENTATION
Spoke with daughter Terri phone number on sticky note. She states that since 2016 her mothers mental status has been declining she used to be able to do all her ADLs until she started having her abdominal surgeries due to a volvulus.  She was placed in a nursing home in 2016. In the last 4 months she has been less verbal and presented to the OSH for a chocking episode which lead to a cardiac arrest.     Patient daughter stated that she would like her mother to be full code and everything to be done for her.         Jeaneth Boyd MD, MPH  Internal Medicine PGY2  [address] [place] [address]

## 2018-12-06 NOTE — PT/OT/SLP EVAL
Speech Language Pathology Evaluation  Bedside Swallow    Patient Name:  Silvia Ornelas   MRN:  [id number]  Admitting Diagnosis: <principal problem not specified>    Recommendations:                 General Recommendations:  ongoing swallow assessment.   Diet recommendations:  NPO, NPO   Aspiration Precautions: Strict aspiration precautions   General Precautions: Standard, aspiration  Communication strategies:  none    History:     Past Medical History:   Diagnosis Date    Atrial fibrillation     Dementia     GERD (gastroesophageal reflux disease)     HLD (hyperlipidemia)     Hypertension     Hypothyroid     Thyroid disease        Past Surgical History:   Procedure Laterality Date    ABDOMINAL SURGERY      EXPLORATORY LAPAROTOMY W/ BOWEL RESECTION      ILEOSTOMY      PARTIAL GASTRECTOMY      SPLENECTOMY, TOTAL         Social History: Patient lives at NH currently. .    Prior diet: Unable to gather from patient.     Subjective     Patient does not follow commands, nonverbal.     Pain/Comfort:  · Pain Rating 1: 0/10    Objective:     Oral Musculature Evaluation  · Oral Musculature: unable to assess due to poor participation/comprehension    Bedside Swallow Eval:   Consistencies Assessed:  · Thin liquids ice chip x1  · Puree tsp coated in puree x1     Oral Phase:   · Poor oral acceptance of ice chip.   · Patient accepted puree trial when tsp presented to lower labial surface.     Pharyngeal Phase:   · multiple spontaneous swallows with puree trial.     Compensatory Strategies  · None    Treatment: patient with high risk of aspiration for all consistencies at this time. Patient to remain NPO. No evidence of learning s/p education.     Assessment:     Silvia Ornelas is a 68 y.o. female with an SLP diagnosis of Dysphagia.      Goals:   Multidisciplinary Problems     SLP Goals        Problem: SLP Goal    Goal Priority Disciplines Outcome   SLP Goal     SLP    Description:  Speech Language Pathology Goals  Goals  expected to be met by 12/13:  1. Patient will participate in ongoing swallow assessment to determine least restrictive diet recommendations.                       Plan:     · Patient to be seen:  4 x/week   · Plan of Care expires:  01/06/19  · Plan of Care reviewed with:  patient   · SLP Follow-Up:  Yes       Discharge recommendations:  other (see comments)   Barriers to Discharge:  None    Time Tracking:     SLP Treatment Date:   12/06/18  Speech Start Time:  1445  Speech Stop Time:  1455     Speech Total Time (min):  10 min    Billable Minutes: Eval Swallow and Oral Function 10    Emily P. Abadie M.S., CCC-SLP  Speech Language Pathologist  [phone]  12/06/2018

## 2018-12-06 NOTE — HPI
"Silvia Blue is 68 year old female transferred from [facility] for fever, requesting ID and general surgery consultations. She has a PMH of severe dementia (baseline status unknown), Afib (rate controlled, not on anticoagulation), HTN, HLD, GERD, and hypothyroidism. 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  She was being medically managed with NG tube to LIS at the OSH for high grade SBO. She failed several swallow studies and due to her many abdominal surgeries, G-tube and J-tube placements failed. She was deemed to be no longer a surgical candidate for feeding tube or bowel obstructions.  She also had bilateral LLL infiltrates, leukocytosis, and fever, so she was placed on gentamycin which was changed to linezolid and cefepime for persistent fevers.  She spike a temp of 107 F and developed rigidity, fine tremors, and hyperflexia.  She was treated with dantrolene for concern of serotonin syndrome d/t linezolid, dilaudid and fentanyl interactions.  The rigidity improved. Linezolid changed to vancomycin and cefepime. Diflucan added for candida in the urine. Antibiotics stopped after a 7 day.  She continued to spike fevers.  She was transferred to [facility] today, admitted to Hospital Medicine for 2nd opinion surgical consult and infectious disease consult.  Hospital Medicine was consulted to evaluate hypernatremia (Sodium of 154).      Chart review shows sodium trended up from the mid 140's throughout OSH stay. Just prior to transport, her sodium " was 153.  Patient is alert on exam and appears to be at her baseline dementia.  She answers person, place, and time questions, but is inappropriate with other responses. She does not meet criteria for transfer to ICU at this time.  Calculated Free water deficit is 3 liters. We recommended replacing with 1.5 liters of D5W over the next 24 hours and monitoring electrolytes every 6 to 8 hours. More frequent labs unnecessary.  May consider TPN if she fails her swallow study here.  This was discussed with Hospital Medicine Staff at the bedside.

## 2018-12-06 NOTE — ASSESSMENT & PLAN NOTE
· Differentials here include recurrent aspiration pneumonia secondary to dysphagia, bacteremia, and UTI.  · Admitted at OSH on 11/18 for high-grade SBO with recurrent bilateral pneumonia on CT scan on 11/26 at OSH.   · Hospital course at OSH complicated by symptoms of serotonin syndrome vs NMS with fever up to 107, status post Dantrolene.   · Status post multiple courses of broad spectrum coverage including Linezolid, Cefepime, and Gentamicin; restarted on 12/4 with Vancomycin, Cefepime, and Diflucan after re-developing fevers. Holding until labs here.  · Afebrile here on presentation. Last known fever 12/5 (100.6) per outside records.    · Repeat CXR ordered. Will consider additional chest imaging.    · Repeat CBC, blood and urine cultures ordered.   · UA repeated.   · Will likely consult ID given persistent fevers without source status post multiple broad spectrum antibiotics.

## 2018-12-06 NOTE — SUBJECTIVE & OBJECTIVE
Past Medical History:   Diagnosis Date    Atrial fibrillation     Dementia     GERD (gastroesophageal reflux disease)     HLD (hyperlipidemia)     Hypertension     Hypothyroid     Thyroid disease        Past Surgical History:   Procedure Laterality Date    ABDOMINAL SURGERY      EXPLORATORY LAPAROTOMY W/ BOWEL RESECTION      ILEOSTOMY      PARTIAL GASTRECTOMY      SPLENECTOMY, TOTAL         Review of patient's allergies indicates:   Allergen Reactions    Sulfa (sulfonamide antibiotics) Other (See Comments)     Severe reaction type unknown        Family History     Family history is unknown by patient.        Tobacco Use    Smoking status: Never Smoker    Smokeless tobacco: Never Used   Substance and Sexual Activity    Alcohol use: No     Frequency: Never    Drug use: No    Sexual activity: Not Currently      Review of Systems   Unable to perform ROS: Dementia     Objective:     Vital Signs (Most Recent):  Temp: 99.4 °F (37.4 °C) (12/06/18 1614)  Pulse: (!) 142(informed  nurse ) (12/06/18 1614)  Resp: 18 (12/06/18 1614)  BP: (!) 155/82 (12/06/18 1614)  SpO2: (!) 92 % (12/06/18 1614) Vital Signs (24h Range):  Temp:  [97.7 °F (36.5 °C)-99.4 °F (37.4 °C)] 99.4 °F (37.4 °C)  Pulse:  [] 142  Resp:  [18] 18  SpO2:  [92 %] 92 %  BP: (137-155)/(74-82) 155/82      There is no height or weight on file to calculate BMI.      Intake/Output Summary (Last 24 hours) at 12/6/2018 1711  Last data filed at 12/6/2018 1500  Gross per 24 hour   Intake 0 ml   Output 460 ml   Net -460 ml       Physical Exam   Constitutional: She is oriented to person, place, and time. She appears well-developed. She is easily aroused. No distress.   HENT:   Head: Normocephalic and atraumatic.   Mouth/Throat: No oropharyngeal exudate.   Oropharynx dry   Eyes: Conjunctivae are normal. Pupils are equal, round, and reactive to light. No scleral icterus.   Neck: Neck supple.   Cardiovascular: Normal rate, regular rhythm, normal heart  sounds and intact distal pulses. Exam reveals no gallop and no friction rub.   No murmur heard.  Pulmonary/Chest: Effort normal and breath sounds normal. No respiratory distress. She has no wheezes. She has no rales.   Abdominal: Soft. Bowel sounds are normal. She exhibits no distension. There is no tenderness. There is no rebound and no guarding.   Musculoskeletal: She exhibits no edema, tenderness or deformity.   Neurological: She is oriented to person, place, and time and easily aroused. She exhibits normal muscle tone.   Skin: Skin is warm and dry. Capillary refill takes less than 2 seconds. No rash noted. She is not diaphoretic. There is erythema (Facial Flushing).   Warm to toush   Psychiatric: Her affect is blunt. Her speech is delayed. Cognition and memory are impaired.   Nursing note and vitals reviewed.      Vents:     Lines/Drains/Airways     Peripherally Inserted Central Catheter Line                 PICC Triple Lumen 12/06/18 1440 right brachial less than 1 day          Drain                 Colostomy 12/06/18 1440 RUQ less than 1 day              Significant Labs:    CBC/Anemia Profile:  Recent Labs   Lab 12/06/18  1255   WBC 17.81*   HGB 8.6*   HCT 27.3*   *   MCV 97   RDW 16.8*        Chemistries:  Recent Labs   Lab 12/06/18  1255   *   K 3.6   *   CO2 24   BUN 17   CREATININE 0.8   CALCIUM 9.6   ALBUMIN 2.9*   PROT 7.5   BILITOT 0.4   ALKPHOS 104   ALT 30   AST 20   MG 1.6   PHOS 2.6*       All pertinent labs within the past 24 hours have been reviewed.    Significant Imaging: I have reviewed all pertinent imaging results/findings within the past 24 hours.       Chest X-Ray  FINDINGS:  Right-sided PICC line in the SVC.  Heart size normal.  Mild opacification in the right upper lobe area.  The lungs are otherwise clear.  No pleural effusion      Impression       See above      Electronically signed by: Juan Jose Herndon MD  Date: 12/06/2018  Time: 12:51

## 2018-12-06 NOTE — CONSULTS
"[facility]  Critical Care Medicine  Consult Note    Patient Name: Silvia Ornelas  MRN: [id number]  Admission Date: 12/6/2018  Hospital Length of Stay: 0 days  Code Status: Full Code  Attending Physician: Humberto Kingston MD   Primary Care Provider: Primary Doctor No   Principal Problem: Fever    Consults  Subjective:     HPI:  Silvia Blue is 68 year old female transferred from [facility] for fever, requesting ID and general surgery consultations. She has a PMH of severe dementia (baseline status unknown), Afib (rate controlled, not on anticoagulation), HTN, HLD, GERD, and hypothyroidism. 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  She was being medically managed with NG tube to LIS at the OSH for high grade SBO. She failed several swallow studies and due to her many abdominal surgeries, G-tube and J-tube placements failed. She was deemed to be no longer a surgical candidate for feeding tube or bowel obstructions.  She also had bilateral LLL infiltrates, leukocytosis, and fever, so she was placed on gentamycin which was changed to linezolid and cefepime for persistent fevers.  She spike a temp of 107 F and developed rigidity, fine tremors, and hyperflexia.  She was treated with dantrolene for concern of serotonin syndrome d/t linezolid, dilaudid and fentanyl interactions.  The rigidity improved. Linezolid changed to vancomycin and cefepime. Diflucan added for candida in the urine. Antibiotics stopped after a 7 day.  She continued " to spike fevers.  She was transferred to [facility] today, admitted to Hospital Medicine for 2nd opinion surgical consult and infectious disease consult.  Hospital Medicine was consulted to evaluate hypernatremia (Sodium of 154).      Chart review shows sodium trended up from the mid 140's throughout OSH stay. Just prior to transport, her sodium was 153.  Patient is alert on exam and appears to be at her baseline dementia.  She answers person, place, and time questions, but is inappropriate with other responses. She does not meet criteria for transfer to ICU at this time.  Calculated Free water deficit is 3 liters. We recommended replacing with 1.5 liters of D5W over the next 24 hours and monitoring electrolytes every 6 to 8 hours. More frequent labs unnecessary.  May consider TPN if she fails her swallow study here.  This was discussed with Hospital Medicine Staff at the bedside.            Hospital/ICU Course:  No notes on file    Past Medical History:   Diagnosis Date    Atrial fibrillation     Dementia     GERD (gastroesophageal reflux disease)     HLD (hyperlipidemia)     Hypertension     Hypothyroid     Thyroid disease        Past Surgical History:   Procedure Laterality Date    ABDOMINAL SURGERY      EXPLORATORY LAPAROTOMY W/ BOWEL RESECTION      ILEOSTOMY      PARTIAL GASTRECTOMY      SPLENECTOMY, TOTAL         Review of patient's allergies indicates:   Allergen Reactions    Sulfa (sulfonamide antibiotics) Other (See Comments)     Severe reaction type unknown        Family History     Family history is unknown by patient.        Tobacco Use    Smoking status: Never Smoker    Smokeless tobacco: Never Used   Substance and Sexual Activity    Alcohol use: No     Frequency: Never    Drug use: No    Sexual activity: Not Currently      Review of Systems   Unable to perform ROS: Dementia     Objective:     Vital Signs (Most Recent):  Temp: 99.4 °F (37.4 °C) (12/06/18 1614)  Pulse: (!)  142(informed  nurse ) (12/06/18 1614)  Resp: 18 (12/06/18 1614)  BP: (!) 155/82 (12/06/18 1614)  SpO2: (!) 92 % (12/06/18 1614) Vital Signs (24h Range):  Temp:  [97.7 °F (36.5 °C)-99.4 °F (37.4 °C)] 99.4 °F (37.4 °C)  Pulse:  [] 142  Resp:  [18] 18  SpO2:  [92 %] 92 %  BP: (137-155)/(74-82) 155/82      There is no height or weight on file to calculate BMI.      Intake/Output Summary (Last 24 hours) at 12/6/2018 1711  Last data filed at 12/6/2018 1500  Gross per 24 hour   Intake 0 ml   Output 460 ml   Net -460 ml       Physical Exam   Constitutional: She is oriented to person, place, and time. She appears well-developed. She is easily aroused. No distress.   HENT:   Head: Normocephalic and atraumatic.   Mouth/Throat: No oropharyngeal exudate.   Oropharynx dry   Eyes: Conjunctivae are normal. Pupils are equal, round, and reactive to light. No scleral icterus.   Neck: Neck supple.   Cardiovascular: Normal rate, regular rhythm, normal heart sounds and intact distal pulses. Exam reveals no gallop and no friction rub.   No murmur heard.  Pulmonary/Chest: Effort normal and breath sounds normal. No respiratory distress. She has no wheezes. She has no rales.   Abdominal: Soft. Bowel sounds are normal. She exhibits no distension. There is no tenderness. There is no rebound and no guarding.   Musculoskeletal: She exhibits no edema, tenderness or deformity.   Neurological: She is oriented to person, place, and time and easily aroused. She exhibits normal muscle tone.   Skin: Skin is warm and dry. Capillary refill takes less than 2 seconds. No rash noted. She is not diaphoretic. There is erythema (Facial Flushing).   Warm to toush   Psychiatric: Her affect is blunt. Her speech is delayed. Cognition and memory are impaired.   Nursing note and vitals reviewed.      Vents:     Lines/Drains/Airways     Peripherally Inserted Central Catheter Line                 PICC Triple Lumen 12/06/18 1440 right brachial less than 1 day           Drain                 Colostomy 12/06/18 1440 RUQ less than 1 day              Significant Labs:    CBC/Anemia Profile:  Recent Labs   Lab 12/06/18  1255   WBC 17.81*   HGB 8.6*   HCT 27.3*   *   MCV 97   RDW 16.8*        Chemistries:  Recent Labs   Lab 12/06/18  1255   *   K 3.6   *   CO2 24   BUN 17   CREATININE 0.8   CALCIUM 9.6   ALBUMIN 2.9*   PROT 7.5   BILITOT 0.4   ALKPHOS 104   ALT 30   AST 20   MG 1.6   PHOS 2.6*       All pertinent labs within the past 24 hours have been reviewed.    Significant Imaging: I have reviewed all pertinent imaging results/findings within the past 24 hours.       Chest X-Ray  FINDINGS:  Right-sided PICC line in the SVC.  Heart size normal.  Mild opacification in the right upper lobe area.  The lungs are otherwise clear.  No pleural effusion      Impression       See above      Electronically signed by: Juan Jose Herndon MD  Date: 12/06/2018  Time: 12:51         Assessment/Plan:     Neuro   Dementia without behavioral disturbance    Per primary note patient has had progression in her demential over the past 2 years, worsening in the past 4 months. She was appears to be at her baseline mental status and without an Acute Encephalopathy     Renal/   Hypernatremia    Due to decrease fluid intake and NG Tube suction as treatment for SBO.  --Calculated Free water deficit 3 liters  --Recommend Giving  1.5 liters over the next 24 hours  --Monitor electrolytes every 6 to 8 hous     GI   Dysphagia    Continue with Speech therapy.  Consider TPN     Other   * Fever    Management by HM and Infectious Disease        Critical Care Time: 45 minutes  Critical secondary to Patient has a condition that poses threat to life and bodily function: Hypernatremia     Critical care was time spent personally by me on the following activities: development of treatment plan with patient or surrogate and bedside caregivers, discussions with consultants, evaluation of patient's response  to treatment, examination of patient, ordering and performing treatments and interventions, ordering and review of laboratory studies, ordering and review of radiographic studies, pulse oximetry, re-evaluation of patient's condition. This critical care time did not overlap with that of any other provider or involve time for any procedures.    Thank you for your consult. We will sign off. Please contact us if you have any additional questions.     Keysha Harrell NP  Critical Care Medicine  [facility]

## 2018-12-07 LAB
ACTH PLAS-MCNC: 32 PG/ML
ANION GAP SERPL CALC-SCNC: 11 MMOL/L
ANION GAP SERPL CALC-SCNC: 14 MMOL/L
ANION GAP SERPL CALC-SCNC: 16 MMOL/L
ANION GAP SERPL CALC-SCNC: 16 MMOL/L
ANION GAP SERPL CALC-SCNC: 17 MMOL/L
ANION GAP SERPL CALC-SCNC: 9 MMOL/L
ASCENDING AORTA: 3.13 CM
AV INDEX (PROSTH): 0.6
AV MEAN GRADIENT: 5.46 MMHG
AV PEAK GRADIENT: 7.62 MMHG
AV VALVE AREA: 1.61 CM2
BASOPHILS # BLD AUTO: 0.05 K/UL
BASOPHILS NFR BLD: 0.4 %
BSA FOR ECHO PROCEDURE: 1.57 M2
BUN SERPL-MCNC: 12 MG/DL
BUN SERPL-MCNC: 14 MG/DL
BUN SERPL-MCNC: 15 MG/DL
BUN SERPL-MCNC: 15 MG/DL
BUN SERPL-MCNC: 16 MG/DL
BUN SERPL-MCNC: 17 MG/DL
CALCIUM SERPL-MCNC: 8.7 MG/DL
CALCIUM SERPL-MCNC: 8.8 MG/DL
CALCIUM SERPL-MCNC: 8.9 MG/DL
CALCIUM SERPL-MCNC: 8.9 MG/DL
CALCIUM SERPL-MCNC: 9 MG/DL
CALCIUM SERPL-MCNC: 9.2 MG/DL
CHLORIDE SERPL-SCNC: 108 MMOL/L
CHLORIDE SERPL-SCNC: 109 MMOL/L
CHLORIDE SERPL-SCNC: 111 MMOL/L
CHLORIDE SERPL-SCNC: 112 MMOL/L
CO2 SERPL-SCNC: 23 MMOL/L
CO2 SERPL-SCNC: 24 MMOL/L
CO2 SERPL-SCNC: 25 MMOL/L
CO2 SERPL-SCNC: 26 MMOL/L
CO2 SERPL-SCNC: 28 MMOL/L
CO2 SERPL-SCNC: 28 MMOL/L
CREAT SERPL-MCNC: 1 MG/DL
CV ECHO LV RWT: 0.52 CM
DIFFERENTIAL METHOD: ABNORMAL
DOP CALC AO PEAK VEL: 1.38 M/S
DOP CALC AO VTI: 23.96 CM
DOP CALC LVOT AREA: 2.69 CM2
DOP CALC LVOT DIAMETER: 1.85 CM
DOP CALC LVOT STROKE VOLUME: 38.47 CM3
DOP CALCLVOT PEAK VEL VTI: 14.32 CM
ECHO LV POSTERIOR WALL: 0.93 CM (ref 0.6–1.1)
EOSINOPHIL # BLD AUTO: 0.1 K/UL
EOSINOPHIL NFR BLD: 0.5 %
ERYTHROCYTE [DISTWIDTH] IN BLOOD BY AUTOMATED COUNT: 17.1 %
EST. GFR  (AFRICAN AMERICAN): >60 ML/MIN/1.73 M^2
EST. GFR  (NON AFRICAN AMERICAN): 58.8 ML/MIN/1.73 M^2
FRACTIONAL SHORTENING: 33 % (ref 28–44)
GLUCOSE SERPL-MCNC: 120 MG/DL
GLUCOSE SERPL-MCNC: 123 MG/DL
GLUCOSE SERPL-MCNC: 123 MG/DL
GLUCOSE SERPL-MCNC: 127 MG/DL
GLUCOSE SERPL-MCNC: 146 MG/DL
GLUCOSE SERPL-MCNC: 164 MG/DL
HCT VFR BLD AUTO: 26.8 %
HGB BLD-MCNC: 8.4 G/DL
HIV 1+2 AB+HIV1 P24 AG SERPL QL IA: NEGATIVE
IMM GRANULOCYTES # BLD AUTO: 0.06 K/UL
IMM GRANULOCYTES NFR BLD AUTO: 0.5 %
INTERVENTRICULAR SEPTUM: 0.98 CM (ref 0.6–1.1)
LA MAJOR: 3.92 CM
LA MINOR: 4.43 CM
LA WIDTH: 2.75 CM
LACTATE SERPL-SCNC: 1.2 MMOL/L
LEFT ATRIUM SIZE: 2.52 CM
LEFT ATRIUM VOLUME INDEX: 15.6 ML/M2
LEFT ATRIUM VOLUME: 24.5 CM3
LEFT INTERNAL DIMENSION IN SYSTOLE: 2.4 CM (ref 2.1–4)
LEFT VENTRICLE DIASTOLIC VOLUME INDEX: 34.32 ML/M2
LEFT VENTRICLE DIASTOLIC VOLUME: 53.79 ML
LEFT VENTRICLE MASS INDEX: 63.9 G/M2
LEFT VENTRICLE SYSTOLIC VOLUME INDEX: 12.8 ML/M2
LEFT VENTRICLE SYSTOLIC VOLUME: 20.06 ML
LEFT VENTRICULAR INTERNAL DIMENSION IN DIASTOLE: 3.58 CM (ref 3.5–6)
LEFT VENTRICULAR MASS: 100.1 G
LYMPHOCYTES # BLD AUTO: 2.1 K/UL
LYMPHOCYTES NFR BLD: 15.9 %
MAGNESIUM SERPL-MCNC: 1.5 MG/DL
MCH RBC QN AUTO: 31.2 PG
MCHC RBC AUTO-ENTMCNC: 31.3 G/DL
MCV RBC AUTO: 100 FL
MONOCYTES # BLD AUTO: 1.4 K/UL
MONOCYTES NFR BLD: 10.7 %
NEUTROPHILS # BLD AUTO: 9.3 K/UL
NEUTROPHILS NFR BLD: 72 %
NRBC BLD-RTO: 0 /100 WBC
PHOSPHATE SERPL-MCNC: 1.5 MG/DL
PLATELET # BLD AUTO: 353 K/UL
PMV BLD AUTO: 12.2 FL
POCT GLUCOSE: 155 MG/DL (ref 70–110)
POTASSIUM SERPL-SCNC: 2.7 MMOL/L
POTASSIUM SERPL-SCNC: 2.9 MMOL/L
POTASSIUM SERPL-SCNC: 3.2 MMOL/L
POTASSIUM SERPL-SCNC: 3.4 MMOL/L
POTASSIUM SERPL-SCNC: 3.4 MMOL/L
POTASSIUM SERPL-SCNC: 3.6 MMOL/L
RA MAJOR: 3.5 CM
RA WIDTH: 2.96 CM
RBC # BLD AUTO: 2.69 M/UL
RETIRED EF AND QEF - SEE NOTES: 56 %
RHEUMATOID FACT SERPL-ACNC: <10 IU/ML
RIGHT VENTRICULAR END-DIASTOLIC DIMENSION: 2.87 CM
SINUS: 3.25 CM
SODIUM SERPL-SCNC: 146 MMOL/L
SODIUM SERPL-SCNC: 148 MMOL/L
SODIUM SERPL-SCNC: 150 MMOL/L
SODIUM SERPL-SCNC: 151 MMOL/L
SODIUM SERPL-SCNC: 152 MMOL/L
SODIUM SERPL-SCNC: 152 MMOL/L
STJ: 3.05 CM
TDI LATERAL: 0.11
TDI SEPTAL: 0.09
TDI: 0.1
TRICUSPID ANNULAR PLANE SYSTOLIC EXCURSION: 1.7 CM
WBC # BLD AUTO: 12.96 K/UL

## 2018-12-07 PROCEDURE — 99222 1ST HOSP IP/OBS MODERATE 55: CPT | Mod: ,,, | Performed by: SURGERY

## 2018-12-07 PROCEDURE — 80048 BASIC METABOLIC PNL TOTAL CA: CPT | Mod: 91

## 2018-12-07 PROCEDURE — G8978 MOBILITY CURRENT STATUS: HCPCS | Mod: CN

## 2018-12-07 PROCEDURE — G8979 MOBILITY GOAL STATUS: HCPCS | Mod: CN

## 2018-12-07 PROCEDURE — 85025 COMPLETE CBC W/AUTO DIFF WBC: CPT

## 2018-12-07 PROCEDURE — 99222 1ST HOSP IP/OBS MODERATE 55: CPT | Mod: GC,,, | Performed by: INTERNAL MEDICINE

## 2018-12-07 PROCEDURE — 36415 COLL VENOUS BLD VENIPUNCTURE: CPT

## 2018-12-07 PROCEDURE — 86431 RHEUMATOID FACTOR QUANT: CPT

## 2018-12-07 PROCEDURE — G8980 MOBILITY D/C STATUS: HCPCS | Mod: CN

## 2018-12-07 PROCEDURE — 97802 MEDICAL NUTRITION INDIV IN: CPT

## 2018-12-07 PROCEDURE — 99222 PR INITIAL HOSPITAL CARE,LEVL II: ICD-10-PCS | Mod: ,,, | Performed by: SURGERY

## 2018-12-07 PROCEDURE — 99233 PR SUBSEQUENT HOSPITAL CARE,LEVL III: ICD-10-PCS | Mod: GC,,, | Performed by: STUDENT IN AN ORGANIZED HEALTH CARE EDUCATION/TRAINING PROGRAM

## 2018-12-07 PROCEDURE — 63600175 PHARM REV CODE 636 W HCPCS: Performed by: STUDENT IN AN ORGANIZED HEALTH CARE EDUCATION/TRAINING PROGRAM

## 2018-12-07 PROCEDURE — 95813 EEG EXTND MNTR 61-119 MIN: CPT

## 2018-12-07 PROCEDURE — 84100 ASSAY OF PHOSPHORUS: CPT

## 2018-12-07 PROCEDURE — 99233 SBSQ HOSP IP/OBS HIGH 50: CPT | Mod: GC,,, | Performed by: STUDENT IN AN ORGANIZED HEALTH CARE EDUCATION/TRAINING PROGRAM

## 2018-12-07 PROCEDURE — 83605 ASSAY OF LACTIC ACID: CPT

## 2018-12-07 PROCEDURE — 92526 ORAL FUNCTION THERAPY: CPT

## 2018-12-07 PROCEDURE — 20600001 HC STEP DOWN PRIVATE ROOM

## 2018-12-07 PROCEDURE — 25000003 PHARM REV CODE 250: Performed by: STUDENT IN AN ORGANIZED HEALTH CARE EDUCATION/TRAINING PROGRAM

## 2018-12-07 PROCEDURE — 95813 EEG EXTND MNTR 61-119 MIN: CPT | Mod: 26,,, | Performed by: PSYCHIATRY & NEUROLOGY

## 2018-12-07 PROCEDURE — 86703 HIV-1/HIV-2 1 RESULT ANTBDY: CPT

## 2018-12-07 PROCEDURE — 86592 SYPHILIS TEST NON-TREP QUAL: CPT

## 2018-12-07 PROCEDURE — 99222 PR INITIAL HOSPITAL CARE,LEVL II: ICD-10-PCS | Mod: GC,,, | Performed by: INTERNAL MEDICINE

## 2018-12-07 PROCEDURE — 95816 EEG AWAKE AND DROWSY: CPT

## 2018-12-07 PROCEDURE — 95813 PR EEG, EXTENDED, 61-119 MINS: ICD-10-PCS | Mod: 26,,, | Performed by: PSYCHIATRY & NEUROLOGY

## 2018-12-07 PROCEDURE — 83735 ASSAY OF MAGNESIUM: CPT

## 2018-12-07 PROCEDURE — 97165 OT EVAL LOW COMPLEX 30 MIN: CPT

## 2018-12-07 PROCEDURE — 97161 PT EVAL LOW COMPLEX 20 MIN: CPT

## 2018-12-07 PROCEDURE — 86038 ANTINUCLEAR ANTIBODIES: CPT

## 2018-12-07 RX ORDER — DEXTROSE MONOHYDRATE 50 MG/ML
INJECTION, SOLUTION INTRAVENOUS CONTINUOUS
Status: DISCONTINUED | OUTPATIENT
Start: 2018-12-07 | End: 2018-12-07

## 2018-12-07 RX ORDER — POTASSIUM CHLORIDE 7.45 MG/ML
10 INJECTION INTRAVENOUS ONCE
Status: COMPLETED | OUTPATIENT
Start: 2018-12-07 | End: 2018-12-07

## 2018-12-07 RX ORDER — ACETAMINOPHEN 650 MG/20.3ML
650 LIQUID ORAL EVERY 4 HOURS PRN
Status: DISCONTINUED | OUTPATIENT
Start: 2018-12-07 | End: 2018-12-10

## 2018-12-07 RX ORDER — POTASSIUM CHLORIDE 14.9 MG/ML
20 INJECTION INTRAVENOUS
Status: COMPLETED | OUTPATIENT
Start: 2018-12-07 | End: 2018-12-08

## 2018-12-07 RX ORDER — HEPARIN SODIUM 5000 [USP'U]/ML
5000 INJECTION, SOLUTION INTRAVENOUS; SUBCUTANEOUS EVERY 12 HOURS
Status: DISCONTINUED | OUTPATIENT
Start: 2018-12-07 | End: 2018-12-10

## 2018-12-07 RX ORDER — POTASSIUM CHLORIDE 14.9 MG/ML
20 INJECTION INTRAVENOUS ONCE
Status: COMPLETED | OUTPATIENT
Start: 2018-12-07 | End: 2018-12-07

## 2018-12-07 RX ORDER — MAGNESIUM SULFATE HEPTAHYDRATE 40 MG/ML
2 INJECTION, SOLUTION INTRAVENOUS ONCE
Status: COMPLETED | OUTPATIENT
Start: 2018-12-07 | End: 2018-12-07

## 2018-12-07 RX ORDER — POTASSIUM CHLORIDE 14.9 MG/ML
20 INJECTION INTRAVENOUS
Status: COMPLETED | OUTPATIENT
Start: 2018-12-07 | End: 2018-12-07

## 2018-12-07 RX ORDER — POTASSIUM CHLORIDE 20 MEQ/1
20 TABLET, EXTENDED RELEASE ORAL ONCE
Status: DISCONTINUED | OUTPATIENT
Start: 2018-12-07 | End: 2018-12-07

## 2018-12-07 RX ADMIN — POTASSIUM CHLORIDE 20 MEQ: 14.9 INJECTION, SOLUTION INTRAVENOUS at 03:12

## 2018-12-07 RX ADMIN — POTASSIUM CHLORIDE 20 MEQ: 14.9 INJECTION, SOLUTION INTRAVENOUS at 11:12

## 2018-12-07 RX ADMIN — POTASSIUM CHLORIDE 20 MEQ: 14.9 INJECTION, SOLUTION INTRAVENOUS at 09:12

## 2018-12-07 RX ADMIN — POTASSIUM CHLORIDE 20 MEQ: 14.9 INJECTION, SOLUTION INTRAVENOUS at 07:12

## 2018-12-07 RX ADMIN — Medication 4.5 G: at 07:12

## 2018-12-07 RX ADMIN — HEPARIN SODIUM 5000 UNITS: 5000 INJECTION, SOLUTION INTRAVENOUS; SUBCUTANEOUS at 09:12

## 2018-12-07 RX ADMIN — DEXTROSE: 5 SOLUTION INTRAVENOUS at 07:12

## 2018-12-07 RX ADMIN — POTASSIUM CHLORIDE 20 MEQ: 14.9 INJECTION, SOLUTION INTRAVENOUS at 05:12

## 2018-12-07 RX ADMIN — Medication 4.5 G: at 04:12

## 2018-12-07 RX ADMIN — MAGNESIUM SULFATE IN WATER 2 G: 40 INJECTION, SOLUTION INTRAVENOUS at 09:12

## 2018-12-07 RX ADMIN — POTASSIUM CHLORIDE 10 MEQ: 7.46 INJECTION, SOLUTION INTRAVENOUS at 09:12

## 2018-12-07 NOTE — PLAN OF CARE
Problem: SLP Goal  Goal: SLP Goal  Speech Language Pathology Goals  Goals expected to be met by 12/13:  1. Patient will participate in ongoing swallow assessment to determine least restrictive diet recommendations.      Outcome: Ongoing (interventions implemented as appropriate)  Pt more alert this date.  Review of pt paper chart indicates pt had two MBSS at OSH which indicated silent aspiration of all consistencies administered.  Final MBSS was completed 12/4/18.  Bedside swallow evaluation in light of this information will not be indicative of pt swallow function.  REC:  Pt remain npo at this time.  Will review recs w/ team.      Madison Luque CCC-SLP  12/7/2018

## 2018-12-07 NOTE — ASSESSMENT & PLAN NOTE
This is a 68 year old female who has had complicated hospitalization over the past month with empiric treatment for fevers and leukocytosis with no definitive source identified (initially some concern for aspiration pneumonia). It is unclear at this time if the patient's continued fevers are due to infectious source or if there is a non-infectious etiology.     Workup to date:   LP: 4 WBC, glucose 118, protein 24, H. Flu negative, S. Pneumoniae negative, N. Meningitidis negative  CT Head: unremarkable  Urinalysis: rare yeast, trace leuk, occasional bacteria  CXR: mild opacification of RUL  Procalcitonin: 0.30    Blood cultures (drawn 12/6): NGTD    RECOMMENDATIONS:     - recommend holding antibiotics for now, and re-culturing if patient becomes febrile  - recommend further workup for non-infectious as well as infectious etiology of fever, including CT chest/abd/pelvis with contrast, echocardiogram, EEG, HIV, RPR, HIV, rheumatoid factor, CK, JÚNIOR  - no need to treat candiduria at this time  - we will follow along

## 2018-12-07 NOTE — PROGRESS NOTES
"[facility] Hospital Medicine  Progress Note    Patient Name: Silvia Ornelas  MRN: [id number]  Patient Class: IP- Inpatient   Admission Date: 12/6/2018  Length of Stay: 1 days  Attending Physician: Humberto Kingston MD  Primary Care Provider: Primary [facility] Medicine Team: [facility] HOSP MED 5 Kelvin Mcadams MD    Subjective:     Principal Problem:Fever    HPI:  This is a 68 year old female transferred from [facility] for fever, requesting ID and general surgery consultations. She has a PMH of severe dementia (baseline status unknown), Afib (rate controlled, not on anticoagulation), HTN, HLD, GERD, and hypothyroidism. 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     History obtained form [facility] paper chart review, secondary to patient's mental status.     She was hospitalized at [facility] on 11/10/18 for suspected aspiration pneumonia; status post treatment with Vanc/Zosyn. She was noted to have abnormal barium swallow at that time; PEG tube placement again discussed with family; they declined. Discharged back to nursing home on 11/16.     She presented again on 11/18/18 for new onset of nausea and vomiting. On presentation, labs significant for BRODY (BUN 24, Cr 2.6). CT A/P showed high-grade small bowel obstruction and bilateral lower lobe consolidations. She was started on Linezolid and Cefepime for suspected bilateral aspiration pneumonia. Her SBO was managed by general surgery with conservative " measures, including NG decompression, pain medications, and IVF.  She then began developing leukocytosis to 18, with new fevers (Tmax 101) on 11/23; 5 day course of Gentamicin was added on 11/22. On 11/26, she was noted to be passing gas and having bowel movements, but continued to have high NG bilious outptut. Repeat CT showed resolving SBO, but continued bilateral lower lobe consolidations. On 11/27, she spiked a fever to 107 (rectal probe) and developed new onset of extremity rigidity, hyperreflexia, and fine tremor, associated with worsening leukocytosis to 28, and lactic of 3. She was assessed to have serotonin syndrome secondary vs NMS due to possible combination of Linezolid, Dilaudid, Fentanyl, and Zofran. She received 140 mg of Dantrolene for possible NMS with symptom improvement; Linezolid discontinued. Patient's antibiotics switched to vancomycin and cefepime for aspiration pneumonitis, WBC was downtrending, and both antibiotics stopped on 12/3 after completed of 6-7 day antibiotic course.  Patient noted to be alert, conversant and oriented to self and with WBC downtrending, antibiotics stopped. Step down from ICU.    Overnight on 12/4, she had resumption of fevers (Tmax 101-102) with again features again of rigidity. Mental status change declined again with minimal  verbal response; patient noted to be ill appearing and diaphoretic.  Medications reviewed with pharmacy to ensure no active medications might be precipitating Serotonin Syndrome. CT head was unremarkable. Blood and urine cultures repeated. Hoover catheter exchanged. LP performed; gram stain negative, but other analyses pending. In discussion of case, decision made to watch patient overnight and re-eval on 12/5. On 12/5, her Vancomycin and Cefepime restarted, also on Diflucan for urine culture positive for Candida.     Additionally, she was noted to have severe oropharyngeal dysphagia. She failed 2 Modified barium swallows, failed again on  12/5; OSH recommendations were for non-oral feeding. She initially had J-tube after last abdominal surgery, but fell out without attempt on repair. GI tried to place a PEG 1 year ago; but was unable.  IR would not want to place J-tube. General surgery at [facility] declined to operate secondary to complex anatomy.  NG tube was in for bulk of hospitalization for decompression and then tube feedings, but removed for concern of nasopharyngeal erosions.      Hospital Course:  12/6: Admission labs notable for leukocytosis of 17, lactic of 2.4, sodium of 154. Restarted on Vanc/Zosyn. ICU consulted for hypernatremia management; recommended D5W correction based on free water deficit. Started on 14 hours of D5W at 70 mL/hr.   12/7: Overnight, patient noted to be tachycardic to 120, with temperature spike to 101.4. Unable to give Tylenol because of NPO/aspiratioin precautions; defervescence without intervention. Leukocytosis down to 13, lactic down to 1.2. Sodium improved to 151; restarted on D5W 70 mL for 10 hours. Consulted general surgery and ID for further recommendations. Patient remains awake, alert, but with disordered and delayed response to questioning.     Interval History: Patient seen at bedside this morning; remains awake, alert, but with disordered and delayed response to questioning, unchanged from yesterday.     Review of Systems   Unable to perform ROS: Dementia     Objective:     Vital Signs (Most Recent):  Temp: 100.2 °F (37.9 °C) (12/07/18 0715)  Pulse: (!) 119 (12/07/18 1100)  Resp: 18 (12/07/18 0715)  BP: 137/68 (12/07/18 0715)  SpO2: 95 % (12/07/18 0438) Vital Signs (24h Range):  Temp:  [98.5 °F (36.9 °C)-101.4 °F (38.6 °C)] 100.2 °F (37.9 °C)  Pulse:  [] 119  Resp:  [18-24] 18  SpO2:  [92 %-96 %] 95 %  BP: (126-155)/(61-82) 137/68     Weight: 55.7 kg (122 lb 12.7 oz)  Body mass index is 22.46 kg/m².    Intake/Output Summary (Last 24 hours) at 12/7/2018 1225  Last data filed at 12/7/2018 0600  Gross  per 24 hour   Intake 0 ml   Output 1960 ml   Net -1960 ml      Physical Exam   Constitutional: She is oriented to person, place, and time. She appears well-developed and well-nourished. No distress.   HENT:   Head: Normocephalic and atraumatic.   Mouth/Throat: Uvula is midline, oropharynx is clear and moist and mucous membranes are normal. Abnormal dentition. No dental abscesses. No tonsillar exudate.   Eyes: Conjunctivae are normal. Pupils are equal, round, and reactive to light. No scleral icterus.   Neck: Neck supple. No JVD present.   Cardiovascular: Regular rhythm, normal heart sounds and normal pulses. Tachycardia present.   No murmur heard.  Pulmonary/Chest: Effort normal and breath sounds normal. No respiratory distress. She has no decreased breath sounds. She has no rales.   Abdominal: Soft. Normal appearance and bowel sounds are normal. She exhibits no distension. There is no tenderness.   Colostomy bag in RLQ; multiple well-healed previous abdominal surgery scars.    Genitourinary:   Genitourinary Comments: Hoover inserted.    Musculoskeletal:   There is atrophy of the muscles of the bilateral lower extremities distal to the knee.    Neurological: She is alert and oriented to person, place, and time. She displays no tremor. No cranial nerve deficit. GCS eye subscore is 4. GCS verbal subscore is 3. GCS motor subscore is 6. She displays no Babinski's sign on the right side. She displays no Babinski's sign on the left side.   There is contracture of the bilateral feel and upper extremities at the elbow with rigidity of movement at those joints.    Skin: Skin is warm and dry. No bruising and no rash noted. She is not diaphoretic.   Skin is flushed around the lower face and anterior neck.      Significant Labs:   Blood Culture:   Recent Labs   Lab 12/06/18  1255   LABBLOO No Growth to date  No Growth to date     BMP:   Recent Labs   Lab 12/07/18  0456 12/07/18  0801   * 120*   * 152*   K 3.4* 3.4*    * 111*   CO2 24 25   BUN 16 15   CREATININE 1.0 1.0   CALCIUM 8.9 9.0   MG 1.5*  --      CBC:   Recent Labs   Lab 12/06/18  1255 12/07/18  0456   WBC 17.81* 12.96*   HGB 8.6* 8.4*   HCT 27.3* 26.8*   * 353*     CMP:   Recent Labs   Lab 12/06/18  1255  12/07/18  0006 12/07/18  0456 12/07/18  0801   *   < > 152* 151* 152*   K 3.6   < > 3.2* 3.4* 3.4*   *   < > 112* 111* 111*   CO2 24   < > 23 24 25   GLU 97   < > 123* 127* 120*   BUN 17   < > 17 16 15   CREATININE 0.8   < > 1.0 1.0 1.0   CALCIUM 9.6   < > 9.2 8.9 9.0   PROT 7.5  --   --   --   --    ALBUMIN 2.9*  --   --   --   --    BILITOT 0.4  --   --   --   --    ALKPHOS 104  --   --   --   --    AST 20  --   --   --   --    ALT 30  --   --   --   --    ANIONGAP 18*   < > 17* 16 16   EGFRNONAA >60.0   < > 58.8* 58.8* 58.8*    < > = values in this interval not displayed.     Coagulation:   Recent Labs   Lab 12/06/18  1255   INR 1.0     Lactic Acid:   Recent Labs   Lab 12/06/18  1255 12/06/18  1930 12/07/18  0006   LACTATE 2.4* 1.2 1.2     Lipase:   Recent Labs   Lab 12/06/18  1255   LIPASE 26     Magnesium:   Recent Labs   Lab 12/06/18  1255 12/07/18  0456   MG 1.6 1.5*     Urine Culture: No results for input(s): LABURIN in the last 48 hours.     Urine Studies:   Recent Labs   Lab 12/06/18 2154   COLORU Yellow   APPEARANCEUA Clear   PHUR 5.0   SPECGRAV 1.010   PROTEINUA 1+*   GLUCUA 1+*   KETONESU 1+*   BILIRUBINUA Negative   OCCULTUA 3+*   NITRITE Negative   LEUKOCYTESUR Trace*   RBCUA 71*   WBCUA 4   BACTERIA Occasional   SQUAMEPITHEL 1   HYALINECASTS 0       Significant Imaging: I have reviewed all pertinent imaging results/findings within the past 24 hours.    Assessment/Plan:      * Fever    · Differentials here include recurrent aspiration pneumonia secondary to dysphagia, bacteremia, and UTI.  · Admitted at OSH on 11/18 for high-grade SBO with recurrent bilateral pneumonia on CT scan on 11/26 at OSH.   · Hospital course at OSH  complicated by symptoms of serotonin syndrome vs NMS with fever up to 107, status post Dantrolene, and contributed to Linezolid usage partly.   · Status post multiple courses of broad spectrum coverage including Linezolid, Cefepime, and Gentamicin; restarted on 12/4 with Vancomycin, Cefepime, and Diflucan after re-developing fevers. Urine culture at OSH positive for Candida.   · PICC line placed at OSH; unclear when inserted; will attempt to contact OSH as not clear in transfer paperwork.   · Admission labs notable for leukocytosis of 17 with 80% granulocytes, and lactic of 2.9; restarted Vanc/Zosyn on 12/6.  · Afebrile here on presentation. Febrile overnight to 101.4; unable to give Tylenol due to NPO.    · Leukocytosis down to 13 this morning.   · CXR here unremarkable.     · Blood cultures ordered. UA unremarkable for infection.    · Could consider CT A/P for possible abdominal abscess.   · ID consulted given persistent fevers without source status post multiple broad spectrum antibiotics.          Hypernatremia    · Sodium was only 145 on admission on 11/18.   · Noted to have progressively increasing Na at OSH up to 153 prior to discharge. Could be explanation for patient's acute mental status change.   · Repeat here 154.   · Evaluated by nephrology there; treated on and off with D5NS and free water, but without consistent correction.   · Will consult ICU for hypernatremia correction given likely need for closely followed sodium levels vs mental status changes.        Dysphagia    · Noted to have severe oropharyngeal dysphagia.   · Failed 2 Modified barium swallows, failed again on 12/5; OSH recommendations were for non-oral feeding. Most recent barium swallow assessed as silent aspirator.   · She initially had J-tube after last abdominal surgery, but fell out without attempt on repair.   · GI tried to place a PEG 1 year ago; but was unable.   · IR would not want to place J-tube.   · General surgery at [facility]  declined to operate secondary to complex anatomy.   · NG tube was in for bulk of hospitalization for decompression and then tube feedings, but removed for concern of nasopharyngeal erosions.   · Speech evaluated here; recommended strict NPO with aspiration precautions.   · Will consult general surgery for possible PEG placement here.   · Consulted dietary/nutrition: Tube feed recommendations placed for initiation, but will wait for surgery recommendations first.        Hypothyroidism    · On Synthroid at home.   · Holding with aspiration precautions.        Atrial fibrillation    · Rate controlled with Bisoprolol at home per outside records.   · CHADVASC score of 3; on no anticoagulation.            Dementia without behavioral disturbance    · Baseline status unknown. Upper level spoke with daughter Terri (phone number on sticky note). She states that since 2016 her mother's mental status has been declining she used to be able to do all her ADLs until she started having her abdominal surgeries due to a volvulus.  She was placed in a nursing home in 2016. In the last 4 months she has been less verbal and presented to the OSH for a chocking episode which lead to a cardiac arrest.  · Patient was noted to be awake, alert, and conversing throughout hospital stay at OSH after resolution of possible serotonin syndrome in ICU.   · Noted to have mental status change associated with minimal verbal response after redevelopment of fevers. CT head and LP at OSH unremarkable.   · Noted to be awake and alert, but with disordered response to questions on admission, no change today.   · Cannot resume home dementia medications with aspiration precautions.          GERD (gastroesophageal reflux disease)    · Holding home PPI with aspiration precautions.        HLD (hyperlipidemia)    · No prior lipid panel in system.   · Holding home Atorvastatin with aspiration precautions.         Essential hypertension    · Controlled.   · Holding  home BP medications with NPO due to aspiration precautions.   · Vitals ordered Q4H.          VTE Risk Mitigation (From admission, onward)        Ordered     Place sequential compression device  Until discontinued      12/06/18 1138     IP VTE LOW RISK PATIENT  Once      12/06/18 1138              Kelvin Mcadams MD  Department of Hospital Medicine   [facility]

## 2018-12-07 NOTE — PLAN OF CARE
Problem: Patient Care Overview  Goal: Plan of Care Review  Outcome: Ongoing (interventions implemented as appropriate)  Pt AAOx1, febrile, free of falls. Pt's Tmax 101.4, moderate rigidity noted, managed with cooling measures. IM 5 notified when page was returned. Pt must remain NPO until further swallow evaluations are completed. IV Abx given and D5W at 75cc/hr, tolerated well. IV K replacement administered for a K of 3.2. Hoover exchanged at start of shift, urine samples sent. Pt mostly non-verbal overnight. Oral care performed and teeth brushed. Repositioning performed q2h. Wedge, heel boots, and prophylaxis foams applied for skin protection. Labs q4h overnight, closely monitored. Na remains elevated at 152 at this time. Terri, pt's daughter updated by family member. TM.

## 2018-12-07 NOTE — PT/OT/SLP EVAL
Occupational Therapy   Evaluation and Discharge Note    Name: Silvia Ornelas  MRN: [id number]  Admitting Diagnosis:  Fever      Recommendations:     Discharge Recommendations: nursing facility, basic  Discharge Equipment Recommendations:     Barriers to discharge:       History:     Occupational Profile:  Living Environment: Pt only able to respond to name; follows 5% of commands  Per chart pt was NH resident.  Prior to admission, patients level of function was unable to determine.  Equipment used at home: (pt unable to state).  DME owned (not currently used): none.  Upon discharge, patient will have assistance from NH staff.    Past Medical History:   Diagnosis Date    Atrial fibrillation     Dementia     GERD (gastroesophageal reflux disease)     HLD (hyperlipidemia)     Hypertension     Hypothyroid     Thyroid disease        Past Surgical History:   Procedure Laterality Date    ABDOMINAL SURGERY      EXPLORATORY LAPAROTOMY W/ BOWEL RESECTION      ILEOSTOMY      PARTIAL GASTRECTOMY      SPLENECTOMY, TOTAL         Objective:     Patient found with:      General Precautions: Standard, fall   Orthopedic Precautions:    Braces:       Occupational Performance:    Bed Mobility:    · Dependent    Functional Mobility/Transfers:  · N/A    Activities of Daily Living:  Dependent - not following commands    Cognitive/Visual Perceptual:  Cognitive/Psychosocial Skills:     -       Oriented to: Person   -       Follows Commands/attention:Poor command-following ability  -       Safety awareness/insight to disability: impaired     Physical Exam:  YUN as pt not following commands. Presents passively with tight musculature.    Patient left supine with call button in reach    AMPAC 6 Click:  AMPAC Total Score: 6    Treatment & Education:  Bed mobility assessment  · Pt sat EOB ~ 5 minutes requiring Max A for balance and unable to assist - pt with fixed flexion of hips/LE assisting with balance.  Education:    Assessment:  "    Silvia Ornelas is a 68 y.o. female with a medical diagnosis of Fever. At this time, patient is functioning at their prior level of function and does not require further acute OT services.     Clinical Decision Makin.  OT Low:  "Pt evaluation falls under low complexity for evaluation coding due to performance deficits noted in 1-3 areas as stated above and 0 co-morbities affecting current functional status. Data obtained from problem focused assessments. No modifications or assistance was required for completion of evaluation. Only brief occupational profile and history review completed."     Plan:     During this hospitalization, patient does not require further acute OT services.  Please re-consult if situation changes.    · Plan of Care Reviewed with: patient    This Plan of care has been discussed with the patient who was involved in its development and understands and is in agreement with the identified goals and treatment plan    GOALS:   Multidisciplinary Problems     Occupational Therapy Goals     Not on file          Multidisciplinary Problems (Resolved)        Problem: Occupational Therapy Goal    Goal Priority Disciplines Outcome Interventions   Occupational Therapy Goal   (Resolved)     OT, PT/OT Outcome(s) achieved                    Time Tracking:     OT Date of Treatment: 18  OT Start Time: 1100  OT Stop Time: 1121  OT Total Time (min): 21 min    Billable Minutes:Evaluation 21    RITA Jaffe  2018    "

## 2018-12-07 NOTE — HOSPITAL COURSE
Admission labs notable for leukocytosis of 17, lactic of 2.4, sodium of 154. Restarted on Vanc/Zosyn. ICU consulted for hypernatremia management; recommended D5W correction based on free water deficit. Started on 14 hours of D5W at 70 mL/hr. Overnight, patient noted to be tachycardic to 120, with temperature spike to 101.4. Unable to give Tylenol because of NPO/aspiratioin precautions; defervescence without intervention. Leukocytosis down to 13, lactic down to 1.2. Sodium improved to 151; restarted on D5W 70 mL for 10 hours. Consulted general surgery and ID for further recommendations. Per ID, discontinued all antibiotics. Ordered HIV, RPR, EEG, JÚNIOR, and RF. Ordered CT A/P: no abscess, expected post-operative anatomy changes, but showing RLL consolidation possibly aspiration pneumonia. Patient remains awake, alert, but with disordered and delayed response to questioning. Tachycardia improved to lower 100's. Leukocytosis resolved (WBC 10). Sodium normalized to 145. NG restarted with tube feeds and some home medications. Pending surgery decision on possible PEG after they review CT findings. Consulted neurology because of concern of mental status changes reported by family over hospital course. Ordered MRI with/without contrast. Considering serotonin syndrome as possible explanation for fevers, tachycardia, mental status, and contractures on exam. MRI unremarkable. Started on Cyproheptadine. New leukocytosis, Started Unasyn, based on RLL consolidation on CT A/P. General surgery discussed PEG with family. Changed Bisoprolol to Metoprolol for tachycardia management. Cyproheptadine discontinued due to concerns of skin flushing and fever after doses given. Patient more verbal today with staff. Scheduled for open G-tube placement in OR today; but procedure was unsuccessful as surgery noted the extent of adhesions in patient's abdomen completely prevents safe access to gastric wall for G-tube placement and greatly increases  her risk of residual bowel perforation. Will likely now reconsider John-feeding tube placement before anticipated transfer back to [place]. Patient remained stable and responded to all question with a yes and no answer. She stated she was not in any pain.extensive talk with daughter yesterday about feeding her and for now they would like everything done. She is stable for transfer back to [place]. NG replaced; re-initiated on tube feeds. Unasyn course completed. Patient passed her barium swallow. Mental status at baseline. Discharged back to her nursing home.

## 2018-12-07 NOTE — ASSESSMENT & PLAN NOTE
· Noted to have severe oropharyngeal dysphagia.   · Failed 2 Modified barium swallows, failed again on 12/5; OSH recommendations were for non-oral feeding. Most recent barium swallow assessed as silent aspirator.   · She initially had J-tube after last abdominal surgery, but fell out without attempt on repair.   · GI tried to place a PEG 1 year ago; but was unable.   · IR would not want to place J-tube.   · General surgery at [facility] declined to operate secondary to complex anatomy.   · NG tube was in for bulk of hospitalization for decompression and then tube feedings, but removed for concern of nasopharyngeal erosions.   · Speech evaluated here; recommended strict NPO with aspiration precautions.   · Will consult general surgery for possible PEG placement here.   · Consulted dietary/nutrition: Tube feed recommendations placed for initiation, but will wait for surgery recommendations first.

## 2018-12-07 NOTE — NURSING
IM 5 paged STAT for second time regarding pt's sepsis alert. Awaiting call back. Ice packs placed to cool pt at this time. IV Abx and IVF continue to run. WCTM.

## 2018-12-07 NOTE — CARE UPDATE
Hoover catheter removed, perianal care performed.  New Hoover placed, sterile urine specimen collected and sent to lab.

## 2018-12-07 NOTE — ASSESSMENT & PLAN NOTE
· Controlled.   · Holding home BP medications with NPO due to aspiration precautions.   · Vitals ordered Q4H.

## 2018-12-07 NOTE — PLAN OF CARE
12/07/18 1458   Discharge Assessment   Assessment Type Discharge Planning Assessment   Confirmed/corrected address and phone number on facesheet? Yes   Assessment information obtained from? Other  (daughter)   Communicated expected length of stay with patient/caregiver no   Prior to hospitilization cognitive status: Alert/Oriented   Prior to hospitalization functional status: Assistive Equipment   Current cognitive status: Not Oriented to Person;Not Oriented to Place;Not Oriented to Time   Current Functional Status: Completely Dependent   Lives With facility resident  (lives at [facility])   Able to Return to Prior Arrangements unable to determine at this time (comments)   Is patient able to care for self after discharge? Unable to determine at this time (comments)   Who are your caregiver(s) and their phone number(s)? Terri - daughter [phone]   Patient's perception of discharge disposition nursing home   Readmission Within The Last 30 Days no previous admission in last 30 days   Patient currently being followed by outpatient case management? No   Patient currently receives any other outside agency services? No   Equipment Currently Used at Home wheelchair;walker, rolling   Do you have any problems affording any of your prescribed medications? No   Is the patient taking medications as prescribed? yes   Does the patient have transportation home? No   Does the patient receive [facility] at [facility]? No   Discharge Plan A Return to nursing home   Discharge Plan B Return to Nursing Home   Patient/Family In Agreement With Plan yes        12/07/18 1458   Discharge Assessment   Assessment Type Discharge Planning Assessment   Confirmed/corrected address and phone number on facesheet? Yes   Assessment information obtained from? Other  (daughter)   Communicated expected length of stay with patient/caregiver no   Prior to hospitilization cognitive status: Alert/Oriented   Prior to  hospitalization functional status: Assistive Equipment   Current cognitive status: Not Oriented to Person;Not Oriented to Place;Not Oriented to Time   Current Functional Status: Completely Dependent   Lives With facility resident  (lives at [facility])   Able to Return to Prior Arrangements unable to determine at this time (comments)   Is patient able to care for self after discharge? Unable to determine at this time (comments)   Who are your caregiver(s) and their phone number(s)? Terri - daughter [phone]   Patient's perception of discharge disposition nursing home   Readmission Within The Last 30 Days no previous admission in last 30 days   Patient currently being followed by outpatient case management? No   Patient currently receives any other outside agency services? No   Equipment Currently Used at Home wheelchair;walker, rolling   Do you have any problems affording any of your prescribed medications? No   Is the patient taking medications as prescribed? yes   Does the patient have transportation home? No   Does the patient receive services at [facility]? No   Discharge Plan A Return to nursing home   Discharge Plan B Return to Nursing Home   Patient/Family In Agreement With Plan yes   CM attempted to obtain discharge planning assessment from patient, however, patient is lethargic and not answering questions, therefore, discharge planning assessment was obtained via telephone with daughter Terri.  Planned discharge is to return to nursing home - Plan (A) and (B).

## 2018-12-07 NOTE — NURSING
MD Jade of IM 5 notified that pt's Tylenol order needs to changed to IV as pt remains strict NPO. Also notified pt's heart rate has sustained in the 120's overnight, despite being afebrile at times. Awaiting new orders. WCTM.

## 2018-12-07 NOTE — PLAN OF CARE
Problem: Physical Therapy Goal  Goal: Physical Therapy Goal  Outcome: Outcome(s) achieved Date Met: 12/07/18  Patient at this time is at their functional baseline and does not require skilled acute PT services at this time. Please re consult PT if pt has change in functional status.   Castillo Mendez PT, DPT  12/7/2018  Pager: [phone]

## 2018-12-07 NOTE — PLAN OF CARE
Problem: Patient Care Overview  Goal: Plan of Care Review  Outcome: Ongoing (interventions implemented as appropriate)  Recommendations     1. TF recommendations: Isosource 1.5 @ 40 mL/hr to provide 1440 calories, 65 grams of protein, 733 mL fluid.              - Hold for residuals >400 mL; additional fluid per MD.  2. TPN recommendations: 170g D, 70 g AA + 250 mL 20% lipids to provide 1358 calories & 70 grams of protein (GIR: 2.12).              - Electrolytes per MD & pharmacy.  3. RD to monitor & follow-up.

## 2018-12-07 NOTE — ASSESSMENT & PLAN NOTE
· Baseline status unknown. Upper level spoke with daughter Terri (phone number on sticky note). She states that since 2016 her mother's mental status has been declining she used to be able to do all her ADLs until she started having her abdominal surgeries due to a volvulus.  She was placed in a nursing home in 2016. In the last 4 months she has been less verbal and presented to the OSH for a chocking episode which lead to a cardiac arrest.  · Patient was noted to be awake, alert, and conversing throughout hospital stay at OSH after resolution of possible serotonin syndrome in ICU.   · Noted to have mental status change associated with minimal verbal response after redevelopment of fevers. CT head and LP at OSH unremarkable.   · Noted to be awake and alert, but with disordered response to questions on admission, no change today.   · Cannot resume home dementia medications with aspiration precautions.

## 2018-12-07 NOTE — HPI
Ms. Silvia Ornelas is a 68 year old female with severe baseline dementia, HTN, hyperlipidemia, atrial fibrillation, hypothyroidism, multiple abdominal surgeries with adhesions and SBO, and GERD who was transferred to [facility] for evaluation of persistent fevers and leukocytosis without identified source. History is obtained from chart review as patient is nonverbal.     Ms. Ornelas was recently admitted to [facility] on 11/10/18 for suspected aspiration pneumonia and treated with Vancomycin and Zosyn. She was discharged back to nursing home on 11/16. She then presented to the hospital again on 11/18/18 for nausea and vomiting. She was found to have high-grade small bowel obstruction and bilateral lower lobe lung consolidations. She was treated again for pneumonia with Linezolid and Cefepime. Her SBO was managed by hgeneral surgery with NG decompression.     On 11/22, patient was started on a five day course of Gentamicin. On 11/23, she developed WBC of 18 and fever with Tmax 101 F. On 11/27, patient developed fever of 107 F rectally, as well as new rigidity, hyperreflexia, and fine tremor with WBC 28 and lactic acid of 3. She was treated with Dantrolene for presumed neuroleptic malignant syndrome. Linezolid was discontinued. CT head was reportedly normal. Patient was then started on Vancomycin and continued Cefepime.     On 12/3, antibiotics were stopped. On 12/4, patient again had fever of 101-102 F and was rigid, diaphoretic, and had decline in mental status. Her alvarez catheter was exchanged, blood and urine cultures were repeated. An LP was performed, with a negative gram stain. Other results were still pending at time of transfer. On 12/5, Diflucan was added for finding of candida in urine. Patient was then transferred to [facility] on 12/6 for ID consultation as well as surgical evaluation for feeding tube placement.     Patient was started on IV Vancomycin and Zosyn upon arrival to [facility] and continued to have fever  with Tmax 101.4. Urinalysis was performed with rare yeast, trace leukocyte esterase, negative nitrites, and occasional bacteria. CXR was obtained with mild opacification of the RUL. WBC was initially 17. Procalcitonin was 0.3, ESR was 113, and CRP was 159. Lactic acid was initially 2.4 and trended down to 1.2.

## 2018-12-07 NOTE — PT/OT/SLP EVAL
Physical Therapy Evaluation and Discharge Note    Patient Name:  Silvia Ornelas   MRN:  [id number]    Recommendations:     Discharge Recommendations:  nursing facility, basic   Discharge Equipment Recommendations: (NH equiptment)   Barriers to discharge: Inaccessible home and Decreased caregiver support    Assessment:     Silvia Ornelas is a 68 y.o. female admitted with a medical diagnosis of Fever. .  At this time, patient is functioning at their prior level of function and does not require further acute PT services.     Recent Surgery: * No surgery found *      Plan:     During this hospitalization, patient does not require further acute PT services.  Please re-consult if situation changes.      Subjective     Chief Complaint: pt only able to state name  Patient/Family Comments/goals: none; pt only able to state name  Pain/Comfort:  · Pain Rating 1: 0/10  · Pain Rating Post-Intervention 1: 0/10    Patients cultural, spiritual, Shinto conflicts given the current situation: none     Living Environment:  Pt only able to respond to name; follows 5% of commands  Per chart pt was NH resident.  Prior to admission, patients level of function was unable to determine.  Equipment used at home: (pt unable to state).  DME owned (not currently used): none.  Upon discharge, patient will have assistance from NH staff.    Objective:     Communicated with RN  prior to session.  Patient found supine upon PT entry to room found with: pulse ox (continuous), telemetry, alvarez catheter     General Precautions: Standard, fall   Orthopedic Precautions:N/A   Braces: N/A     Exams:  · Cognitive Exam:  Patient is oriented to Person, Place, Time and Situation  · Fine Motor Coordination: -       Intact  · Gross Motor Coordination:  WFL  · Postural Exam:  Patient presented with the following abnormalities: -       Rounded shoulders  · -       Forward head  · Sensation: -       Intact  · Skin Integrity/Edema:  -       Skin integrity: Thin and  flused and noted diaphreses  · RLE ROM: Deficits: limited hip ext  · RLE Strength: Deficits: unable to formally asess; pt unable to voluntarily perfrom AROM   · LLE ROM: Deficits: limited hip flexion  · LLE Strength: Deficits: unable to formally asess; pt unable to voluntarily perfrom AROM     Functional Mobility:  · Bed Mobility:  Rolling Left:  total assistance  · Rolling Right: total assistance  · Supine to Sit: total assistance  · Sit to Supine: total assistance  · Transfers:  Sit to Stand:  total assistance with no AD and hand-held assist  · Balance: total for standing balance; CGA to max for EOB, pt with fixed flexion of hips/LE assisting with balance;     AM-PAC 6 CLICK MOBILITY  Total Score:6       Therapeutic Activities and Exercises:  \   Patient education  · Patient educated on the role of PT and POC  · Patient educated on importance  activity while in the hosptial per tolerance for improved endurance and to limit deconditioning   · Patient educated on safe transfers with nursing as appropriate  · Patient educated on energy conservation, pursed lip breathing  · Patient educated on proper transfer mechanics and safety  · All of patients questions were answered within the scope of PT        AM-PAC 6 CLICK MOBILITY  Total Score:6     Patient left left sidelying with all lines intact, call button in reach and RN notified.    GOALS:   Multidisciplinary Problems     Physical Therapy Goals     Not on file          Multidisciplinary Problems (Resolved)        Problem: Physical Therapy Goal    Goal Priority Disciplines Outcome Goal Variances Interventions   Physical Therapy Goal   (Resolved)     PT, PT/OT Outcome(s) achieved                     History:     Past Medical History:   Diagnosis Date    Atrial fibrillation     Dementia     GERD (gastroesophageal reflux disease)     HLD (hyperlipidemia)     Hypertension     Hypothyroid     Thyroid disease        Past Surgical History:   Procedure Laterality Date     ABDOMINAL SURGERY      EXPLORATORY LAPAROTOMY W/ BOWEL RESECTION      ILEOSTOMY      PARTIAL GASTRECTOMY      SPLENECTOMY, TOTAL         Clinical Decision Making:     History  Co-morbidities and personal factors that may impact the plan of care Examination  Body Structures and Functions, activity limitations and participation restrictions that may impact the plan of care Clinical Presentation   Decision Making/ Complexity Score   Co-morbidities:   [x] Time since onset of injury / illness / exacerbation  [x] Status of current condition  []Patient's cognitive status and safety concerns    [x] Multiple Medical Problems (see med hx)  Personal Factors:   [] Patient's age  [] Prior Level of function   [] Patient's home situation (environment and family support)  [] Patient's level of motivation  [x] Expected progression of patient      HISTORY:(criteria)    [] 92337 - no personal factors/history    [x] 54105 - has 1-2 personal factor/comorbidity     [] 56672 - has >3 personal factor/comorbidity     Body Regions:  [] Objective examination findings  [] Head     []  Neck  [] Trunk   [x] Upper Extremity  [x] Lower Extremity    Body Systems:  [x] For communication ability, affect, cognition, language, and learning style: the assessment of the ability to make needs known, consciousness, orientation (person, place, and time), expected emotional /behavioral responses, and learning preferences (eg, learning barriers, education  needs)  [x] For the neuromuscular system: a general assessment of gross coordinated movement (eg, balance, gait, locomotion, transfers, and transitions) and motor function  (motor control and motor learning)  [x] For the musculoskeletal system: the assessment of gross symmetry, gross range of motion, gross strength, height, and weight  [] For the integumentary system: the assessment of pliability(texture), presence of scar formation, skin color, and skin integrity  [] For cardiovascular/pulmonary  system: the assessment of heart rate, respiratory rate, blood pressure, and edema     Activity limitations:    [] Patient's cognitive status and saf ety concerns          [] Status of current condition      [] Weight bearing restriction  [] Cardiopulmunary Restriction    Participation Restrictions:   [] Goals and goal agreement with the patient     [] Rehab potential (prognosis) and probable outcome      Examination of Body System: (criteria)    [] 54605 - addressing 1-2 elements    [x] 38152 - addressing a total of 3 or more elements     [] 67010 -  Addressing a total of 4 or more elements         Clinical Presentation: (criteria)  Stable - 75840     On examination of body system using standardized tests and measures patient presents with 1-2 elements from any of the following: body structures and functions, activity limitations, and/or participation restrictions.  Leading to a clinical presentation that is considered stable and/or uncomplicated                              Clinical Decision Making  (Eval Complexity):  Low- 73076     Time Tracking:     PT Received On: 12/07/18  PT Start Time: 1100     PT Stop Time: 1121  PT Total Time (min): 21 min     Billable Minutes: Evaluation 21 min      Castillo Mendez, PT  12/07/2018

## 2018-12-07 NOTE — PLAN OF CARE
Problem: Occupational Therapy Goal  Goal: Occupational Therapy Goal  Outcome: Outcome(s) achieved Date Met: 12/07/18  Eval and D/C - pt dependent at baseline.    RITA Jaffe

## 2018-12-07 NOTE — SUBJECTIVE & OBJECTIVE
Interval History: Patient seen at bedside this morning; remains awake, alert, but with disordered and delayed response to questioning, unchanged from yesterday.     Review of Systems   Unable to perform ROS: Dementia     Objective:     Vital Signs (Most Recent):  Temp: 100.2 °F (37.9 °C) (12/07/18 0715)  Pulse: (!) 119 (12/07/18 1100)  Resp: 18 (12/07/18 0715)  BP: 137/68 (12/07/18 0715)  SpO2: 95 % (12/07/18 0438) Vital Signs (24h Range):  Temp:  [98.5 °F (36.9 °C)-101.4 °F (38.6 °C)] 100.2 °F (37.9 °C)  Pulse:  [] 119  Resp:  [18-24] 18  SpO2:  [92 %-96 %] 95 %  BP: (126-155)/(61-82) 137/68     Weight: 55.7 kg (122 lb 12.7 oz)  Body mass index is 22.46 kg/m².    Intake/Output Summary (Last 24 hours) at 12/7/2018 1225  Last data filed at 12/7/2018 0600  Gross per 24 hour   Intake 0 ml   Output 1960 ml   Net -1960 ml      Physical Exam   Constitutional: She is oriented to person, place, and time. She appears well-developed and well-nourished. No distress.   HENT:   Head: Normocephalic and atraumatic.   Mouth/Throat: Uvula is midline, oropharynx is clear and moist and mucous membranes are normal. Abnormal dentition. No dental abscesses. No tonsillar exudate.   Eyes: Conjunctivae are normal. Pupils are equal, round, and reactive to light. No scleral icterus.   Neck: Neck supple. No JVD present.   Cardiovascular: Regular rhythm, normal heart sounds and normal pulses. Tachycardia present.   No murmur heard.  Pulmonary/Chest: Effort normal and breath sounds normal. No respiratory distress. She has no decreased breath sounds. She has no rales.   Abdominal: Soft. Normal appearance and bowel sounds are normal. She exhibits no distension. There is no tenderness.   Colostomy bag in RLQ; multiple well-healed previous abdominal surgery scars.    Genitourinary:   Genitourinary Comments: Hoover inserted.    Musculoskeletal:   There is atrophy of the muscles of the bilateral lower extremities distal to the knee.     Neurological: She is alert and oriented to person, place, and time. She displays no tremor. No cranial nerve deficit. GCS eye subscore is 4. GCS verbal subscore is 3. GCS motor subscore is 6. She displays no Babinski's sign on the right side. She displays no Babinski's sign on the left side.   There is contracture of the bilateral feel and upper extremities at the elbow with rigidity of movement at those joints.    Skin: Skin is warm and dry. No bruising and no rash noted. She is not diaphoretic.   Skin is flushed around the lower face and anterior neck.      Significant Labs:   Blood Culture:   Recent Labs   Lab 12/06/18  1255   LABBLOO No Growth to date  No Growth to date     BMP:   Recent Labs   Lab 12/07/18  0456 12/07/18  0801   * 120*   * 152*   K 3.4* 3.4*   * 111*   CO2 24 25   BUN 16 15   CREATININE 1.0 1.0   CALCIUM 8.9 9.0   MG 1.5*  --      CBC:   Recent Labs   Lab 12/06/18  1255 12/07/18  0456   WBC 17.81* 12.96*   HGB 8.6* 8.4*   HCT 27.3* 26.8*   * 353*     CMP:   Recent Labs   Lab 12/06/18  1255  12/07/18  0006 12/07/18  0456 12/07/18  0801   *   < > 152* 151* 152*   K 3.6   < > 3.2* 3.4* 3.4*   *   < > 112* 111* 111*   CO2 24   < > 23 24 25   GLU 97   < > 123* 127* 120*   BUN 17   < > 17 16 15   CREATININE 0.8   < > 1.0 1.0 1.0   CALCIUM 9.6   < > 9.2 8.9 9.0   PROT 7.5  --   --   --   --    ALBUMIN 2.9*  --   --   --   --    BILITOT 0.4  --   --   --   --    ALKPHOS 104  --   --   --   --    AST 20  --   --   --   --    ALT 30  --   --   --   --    ANIONGAP 18*   < > 17* 16 16   EGFRNONAA >60.0   < > 58.8* 58.8* 58.8*    < > = values in this interval not displayed.     Coagulation:   Recent Labs   Lab 12/06/18  1255   INR 1.0     Lactic Acid:   Recent Labs   Lab 12/06/18  1255 12/06/18  1930 12/07/18  0006   LACTATE 2.4* 1.2 1.2     Lipase:   Recent Labs   Lab 12/06/18  1255   LIPASE 26     Magnesium:   Recent Labs   Lab 12/06/18  1255 12/07/18  0456   MG  1.6 1.5*     Urine Culture: No results for input(s): LABURIN in the last 48 hours.     Urine Studies:   Recent Labs   Lab 12/06/18  2154   COLORU Yellow   APPEARANCEUA Clear   PHUR 5.0   SPECGRAV 1.010   PROTEINUA 1+*   GLUCUA 1+*   KETONESU 1+*   BILIRUBINUA Negative   OCCULTUA 3+*   NITRITE Negative   LEUKOCYTESUR Trace*   RBCUA 71*   WBCUA 4   BACTERIA Occasional   SQUAMEPITHEL 1   HYALINECASTS 0       Significant Imaging: I have reviewed all pertinent imaging results/findings within the past 24 hours.

## 2018-12-07 NOTE — CONSULTS
"  [facility]  Adult Nutrition  Consult Note    SUMMARY     Recommendations    1. TF recommendations: Isosource 1.5 @ 40 mL/hr to provide 1440 calories, 65 grams of protein, 733 mL fluid.   - Hold for residuals >400 mL; additional fluid per MD.  2. TPN recommendations: 170g D, 70 g AA + 250 mL 20% lipids to provide 1358 calories & 70 grams of protein (GIR: 2.12).   - Electrolytes per MD & pharmacy.  3. RD to monitor & follow-up.    Goals: Meet % EEN, EPN  Nutrition Goal Status: new  Communication of RD Recs: reviewed with RN    Reason for Assessment    Reason for Assessment: consult  Diagnosis: other (see comments)(Fever)  Relevant Medical History: HTN, HLD, Dementia, Multiple abdominal surgeries, failed PEG tube placement  Interdisciplinary Rounds: did not attend    General Information Comments: Pt remains NPO, per ST recommendations. Presents from NH w/ no family at bedside. Disoriented/aphasic at time of visit. Unsure of nutrition plan at this time. Patient appears underweight, but RD unable to determine patient's UBW & PO intake PTA. Therefore RD unable to determine if patient meets criteria for malnutrition at this time.   Nutrition Discharge Planning: Adequate nutrition    Nutrition/Diet History    Patient Reported Diet/Restrictions/Preferences: other (see comments)(YUN)  Factors Affecting Nutritional Intake: NPO    Anthropometrics    Temp: 100.2 °F (37.9 °C)  Height: 5' 2" (157.5 cm)  Height (inches): 62 in  Weight Method: Bed Scale  Weight: 55.7 kg (122 lb 12.7 oz)  Weight (lb): 122.8 lb  Ideal Body Weight (IBW), Female: 110 lb  % Ideal Body Weight, Female (lb): 111.64 lb  BMI (Calculated): 22.5  BMI Grade: 18.5-24.9 - normal  Usual Body Weight (UBW), kg: (YUN)    Lab/Procedures/Meds    Pertinent Labs Reviewed: reviewed  Pertinent Labs Comments: Na 152  Pertinent Medications Reviewed: reviewed  Pertinent Medications Comments: D5    Physical Findings/Assessment    Overall Physical " Appearance: listlessness, underweight, weak  Tubes: other (see comments)(Colostomy)  Oral/Mouth Cavity: WDL  Skin: intact    Estimated/Assessed Needs    Weight Used For Calorie Calculations: 55.7 kg (122 lb 12.7 oz)     Energy Calorie Requirements (kcal): 1365 kcal/d  Energy Need Method: [place]-St Jeor(1.3 PAL)     Protein Requirements: 67-73 g/d (1.2-1.3 g/kg)  Weight Used For Protein Calculations: 55.7 kg (122 lb 12.7 oz)     Fluid Need Method: other (see comments)(Per MD or 1 mL/kcal)     Nutrition Prescription Ordered    Current Diet Order: NPO    Evaluation of Received Nutrient/Fluid Intake    Other Calories (kcal): 306(D5)    Comments: LBM: not recorded    Nutrition Risk    Level of Risk/Frequency of Follow-up: (2x/week)     Assessment and Plan    Nutrition Problem  Inadequate energy intake    Related to (etiology):   Inability to consume sufficient energy    Signs and Symptoms (as evidenced by):   NPO with no alternate means of nutrition     Nutrition Diagnosis Status:   New     Monitor and Evaluation    Food and Nutrient Intake: enteral nutrition intake, parenteral nutrition intake  Food and Nutrient Adminstration: enteral and parenteral nutrition administration  Physical Activity and Function: nutrition-related ADLs and IADLs  Anthropometric Measurements: weight, weight change  Biochemical Data, Medical Tests and Procedures: lipid profile, glucose/endocrine profile, inflammatory profile, gastrointestinal profile, electrolyte and renal panel  Nutrition-Focused Physical Findings: overall appearance     Nutrition Follow-Up    RD Follow-up?: Yes

## 2018-12-07 NOTE — PT/OT/SLP PROGRESS
"Speech Language Pathology Treatment    Patient Name:  Silvia Ornelas   MRN:  [id number]  Admitting Diagnosis: Fever    Recommendations:                 General Recommendations:  Dysphagia therapy and Modified barium swallow study  Diet recommendations:  NPO, Liquid Diet Level: NPO   Aspiration Precautions: Frequent oral care and Strict aspiration precautions   General Precautions: Standard, aspiration, fall, NPO  Communication strategies:  provide increased time to answer    Subjective     "That's good."  Pt stated re: ice chip trials  Patient goals: none expressed     Pain/Comfort:  · Pain Rating 1: 0/10  · Pain Rating Post-Intervention 1: 0/10    Objective:     Has the patient been evaluated by SLP for swallowing?   Yes  Keep patient NPO? Yes   Current Respiratory Status: room air      Pt was resting heavily upon SLP presentation.  She was seated upright and was arousable given max cues.  She was presented ice chips to oral cavity.  Upon stimulation she was aroused and began to reach for ice chips w/ her lips.  She was offered and accepted po trials of ice chip x1, tsp sips of thin liquid x2, tsp bites of pudding x2, clinician fed bite of solid x1 cup sips x2 and straw sip x1.  No overt signs of aspiration were seen.  SLP reviewed pt performance w/ RN and risk of silent aspiration.  RN provided SLP pt paper chart from OSH.  SLP was able to find documentation of MBSS completed 11/27/18 which indicated penetration of pudding and honey but no aspiration and 12/4/18 which indicated silent aspiration of thin, nectar, honey and pudding consistencies.  Reviewed w/ RN need for pt to remain npo at this time.    Assessment:     Silvia Ornelas is a 68 y.o. female with an SLP diagnosis of Dysphagia.  She presents with hx of severe oropharyngeal dysphagia characterized by silent aspiration of all consistencies trialed.  Prognosis for improvement guarded.    Goals:   Multidisciplinary Problems     SLP Goals        Problem: SLP " Goal    Goal Priority Disciplines Outcome   SLP Goal     SLP Ongoing (interventions implemented as appropriate)   Description:  Speech Language Pathology Goals  Goals expected to be met by 12/13:  1. Patient will participate in ongoing swallow assessment to determine least restrictive diet recommendations.              Problem: SLP Goal    Goal Priority Disciplines Outcome   SLP Goal     SLP                    Plan:     · Patient to be seen:  4 x/week   · Plan of Care expires:  01/06/18  · Plan of Care reviewed with:  patient   · SLP Follow-Up:  Yes       Discharge recommendations:  other (see comments)(pending pt progress)   Barriers to Discharge:  Inaccessible Home Environment and Decreased Care Giver Support    Time Tracking:     SLP Treatment Date:   12/07/18  Speech Start Time:  1040  Speech Stop Time:  1107     Speech Total Time (min):  27 min    Billable Minutes: Eval Swallow and Oral Function 27    Madison Luque CCC-SLP  12/07/2018

## 2018-12-07 NOTE — ASSESSMENT & PLAN NOTE
· Differentials here include recurrent aspiration pneumonia secondary to dysphagia, bacteremia, and UTI.  · Admitted at OSH on 11/18 for high-grade SBO with recurrent bilateral pneumonia on CT scan on 11/26 at OSH.   · Hospital course at OSH complicated by symptoms of serotonin syndrome vs NMS with fever up to 107, status post Dantrolene, and contributed to Linezolid usage partly.   · Status post multiple courses of broad spectrum coverage including Linezolid, Cefepime, and Gentamicin; restarted on 12/4 with Vancomycin, Cefepime, and Diflucan after re-developing fevers. Urine culture at OSH positive for Candida.   · PICC line placed at OSH; unclear when inserted; will attempt to contact OSH as not clear in transfer paperwork.   · Admission labs notable for leukocytosis of 17 with 80% granulocytes, and lactic of 2.9; restarted Vanc/Zosyn on 12/6.  · Afebrile here on presentation. Febrile overnight to 101.4; unable to give Tylenol due to NPO.    · Leukocytosis down to 13 this morning.   · CXR here unremarkable.     · Blood cultures ordered. UA unremarkable for infection.    · Could consider CT A/P for possible abdominal abscess.   · ID consulted given persistent fevers without source status post multiple broad spectrum antibiotics.

## 2018-12-07 NOTE — HPI
"Ms Ornelas is a 69 yo F transferred from [facility] for fever of unknown source requesting ID and general surgery consultations for workup of fevers and placement of PEG in a patient with extensive surgical history.    From primary team note:     She has a PMH of severe dementia (baseline status unknown), Afib (rate controlled, not on anticoagulation), HTN, HLD, GERD, and hypothyroidism.     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      History obtained form OSH paper chart review, secondary to patient's mental status.      She was hospitalized at [facility] on 11/10/18 for suspected aspiration pneumonia; status post treatment with Vanc/Zosyn. She was noted to have abnormal barium swallow at that time.   PEG tube placement again discussed with family; they declined.    Discharged back to nursing home on 11/16.      She presented again on 11/18/18 for new onset of nausea and vomiting. On presentation, labs significant for BRODY (BUN 24, Cr 2.6). CT A/P showed high-grade small bowel obstruction and bilateral lower lobe consolidations. She was started on Linezolid and Cefepime for suspected bilateral aspiration pneumonia. Her SBO was managed by general surgery with conservative measures, including NG decompression, pain medications, and IVF.  She then began developing leukocytosis to 18, with new fevers (Tmax 101) on 11/23; 5 day course of Gentamicin was added on 11/22.     On 11/26, she was noted to be passing gas and " having bowel movements, but continued to have high NG bilious outptut. Repeat CT showed resolving SBO, but continued bilateral lower lobe consolidations. On 11/27, she spiked a fever to 107 (rectal probe) and developed new onset of extremity rigidity, hyperreflexia, and fine tremor, associated with worsening leukocytosis to 28, and lactic of 3. She was assessed to have serotonin syndrome secondary vs NMS due to possible combination of Linezolid, Dilaudid, Fentanyl, and Zofran. She received 140 mg of Dantrolene for possible NMS with symptom improvement; Linezolid discontinued. Patient's antibiotics switched to vancomycin and cefepime for aspiration pneumonitis, WBC was downtrending, and both antibiotics stopped on 12/3 after completed of 6-7 day antibiotic course.  Patient noted to be alert, conversant and oriented to self and with WBC downtrending, antibiotics stopped. Step down from ICU.     Overnight on 12/4, she had resumption of fevers (Tmax 101-102) with again features again of rigidity. Mental status change declined again with minimal  verbal response; patient noted to be ill appearing and diaphoretic.  Medications reviewed with pharmacy to ensure no active medications might be precipitating Serotonin Syndrome. CT head was unremarkable. Blood and urine cultures repeated. Hoover catheter exchanged. LP performed; gram stain negative, but other analyses pending. In discussion of case, decision made to watch patient overnight and re-eval on 12/5. On 12/5, her Vancomycin and Cefepime restarted, also on Diflucan for urine culture positive for Candida.      Additionally, she was noted to have severe oropharyngeal dysphagia. She failed 2 Modified barium swallows, failed again on 12/5; OSH recommendations were for non-oral feeding. She initially had J-tube after last abdominal surgery, but fell out without attempt on repair. GI tried to place a PEG 1 year ago; but was unable.  IR would not want to place J-tube. General  surgery at [facility] declined to operate secondary to complex anatomy.      NG tube was in for bulk of hospitalization for decompression and then tube feedings, but removed for concern of nasopharyngeal erosions.

## 2018-12-07 NOTE — SUBJECTIVE & OBJECTIVE
Past Medical History:   Diagnosis Date    Atrial fibrillation     Dementia     GERD (gastroesophageal reflux disease)     HLD (hyperlipidemia)     Hypertension     Hypothyroid     Thyroid disease        Past Surgical History:   Procedure Laterality Date    ABDOMINAL SURGERY      EXPLORATORY LAPAROTOMY W/ BOWEL RESECTION      ILEOSTOMY      PARTIAL GASTRECTOMY      SPLENECTOMY, TOTAL         Review of patient's allergies indicates:   Allergen Reactions    Sulfa (sulfonamide antibiotics) Other (See Comments)     Severe reaction type unknown        Medications:  Medications Prior to Admission   Medication Sig    aspirin 81 MG Chew Take 81 mg by mouth once daily.    atorvastatin (LIPITOR) 10 MG tablet Take 10 mg by mouth once daily.    bisoprolol (ZEBETA) 5 MG tablet Take 5 mg by mouth once daily.    donepezil (ARICEPT) 10 MG tablet Take 10 mg by mouth every evening.    levothyroxine (SYNTHROID) 25 MCG tablet Take 25 mcg by mouth once daily.    pantoprazole (PROTONIX) 40 MG tablet Take 40 mg by mouth once daily.    risperiDONE (RISPERDAL) 1 MG tablet Take 1 mg by mouth 2 (two) times daily.    rivastigmine (EXELON) 9.5 mg/24 hr PT24 Place 1 patch onto the skin once daily.     Antibiotics (From admission, onward)    Start     Stop Route Frequency Ordered    12/06/18 1900  vancomycin in dextrose 5 % 1 gram/250 mL IVPB 1,000 mg  (Vancomycin IVPB with levels panel)      -- IV Every 24 hours (non-standard times) 12/06/18 1752    12/06/18 1900  piperacillin-tazobactam 4.5 g in sodium chloride 0.9% 100 mL IVPB (ready to mix system)      -- IV Every 8 hours (non-standard times) 12/06/18 1752        Antifungals (From admission, onward)    None        Antivirals (From admission, onward)    None             There is no immunization history on file for this patient.    Family History     Family history is unknown by patient.        Social History     Socioeconomic History    Marital status: Other     Spouse  name: None    Number of children: None    Years of education: None    Highest education level: None   Social Needs    Financial resource strain: None    Food insecurity - worry: None    Food insecurity - inability: None    Transportation needs - medical: None    Transportation needs - non-medical: None   Occupational History    None   Tobacco Use    Smoking status: Never Smoker    Smokeless tobacco: Never Used   Substance and Sexual Activity    Alcohol use: No     Frequency: Never    Drug use: No    Sexual activity: Not Currently   Other Topics Concern    None   Social History Narrative    None     Review of Systems   Unable to perform ROS: Dementia     Objective:     Vital Signs (Most Recent):  Temp: 100.2 °F (37.9 °C) (12/07/18 0715)  Pulse: 87 (12/07/18 0715)  Resp: 18 (12/07/18 0715)  BP: 137/68 (12/07/18 0715)  SpO2: 95 % (12/07/18 0438) Vital Signs (24h Range):  Temp:  [97.7 °F (36.5 °C)-101.4 °F (38.6 °C)] 100.2 °F (37.9 °C)  Pulse:  [] 87  Resp:  [18-24] 18  SpO2:  [92 %-96 %] 95 %  BP: (126-155)/(61-82) 137/68     Weight: 55.7 kg (122 lb 12.7 oz)  Body mass index is 22.46 kg/m².    Estimated Creatinine Clearance: 43.8 mL/min (based on SCr of 1 mg/dL).    Physical Exam   Constitutional: No distress.   Elderly, chronically-ill appearing white female   HENT:   Head: Normocephalic and atraumatic.   Mouth/Throat: Oropharynx is clear and moist.   Eyes: Conjunctivae are normal. No scleral icterus.   Neck: Normal range of motion.   Cardiovascular: Exam reveals no friction rub.   No murmur heard.  tachycardic   Pulmonary/Chest: Effort normal and breath sounds normal. No stridor. No respiratory distress. She has no wheezes.   Abdominal: Soft. Bowel sounds are normal. She exhibits no distension. There is no tenderness.   Musculoskeletal:   Stiffness on passive ROM of arms. Resting tremor noted   Lymphadenopathy:     She has no cervical adenopathy.   Neurological: She is alert.   Awake, alert,  nonverbal but tracks with eyes   Skin: Skin is warm and dry. No rash noted. She is not diaphoretic.       Significant Labs:   CBC:   Recent Labs   Lab 12/06/18  1255 12/07/18  0456   WBC 17.81* 12.96*   HGB 8.6* 8.4*   HCT 27.3* 26.8*   * 353*     CMP:   Recent Labs   Lab 12/06/18  1255  12/07/18  0006 12/07/18  0456 12/07/18  0801   *   < > 152* 151* 152*   K 3.6   < > 3.2* 3.4* 3.4*   *   < > 112* 111* 111*   CO2 24   < > 23 24 25   GLU 97   < > 123* 127* 120*   BUN 17   < > 17 16 15   CREATININE 0.8   < > 1.0 1.0 1.0   CALCIUM 9.6   < > 9.2 8.9 9.0   PROT 7.5  --   --   --   --    ALBUMIN 2.9*  --   --   --   --    BILITOT 0.4  --   --   --   --    ALKPHOS 104  --   --   --   --    AST 20  --   --   --   --    ALT 30  --   --   --   --    ANIONGAP 18*   < > 17* 16 16   EGFRNONAA >60.0   < > 58.8* 58.8* 58.8*    < > = values in this interval not displayed.       Significant Imaging: I have reviewed all pertinent imaging results/findings within the past 24 hours.

## 2018-12-07 NOTE — CONSULTS
[facility]  Infectious Disease  Consult Note    Patient Name: Silvia Ornelas  MRN: [id number]  Admission Date: 12/6/2018  Hospital Length of Stay: 1 days  Attending Physician: Humberto Kingston MD  Primary Care Provider: Primary Doctor No     Isolation Status: No active isolations    Patient information was obtained from past medical records and ER records.      Inpatient consult to Infectious Diseases  Consult performed by: Emily Christine DO  Consult ordered by: Kelvin Mcadams MD        Assessment/Plan:     * Fever    This is a 68 year old female who has had complicated hospitalization over the past month with empiric treatment for fevers and leukocytosis with no definitive source identified (initially some concern for aspiration pneumonia). It is unclear at this time if the patient's continued fevers are due to infectious source or if there is a non-infectious etiology.     Workup to date:   LP: 4 WBC, glucose 118, protein 24, H. Flu negative, S. Pneumoniae negative, N. Meningitidis negative  CT Head: unremarkable  Urinalysis: rare yeast, trace leuk, occasional bacteria  CXR: mild opacification of RUL  Procalcitonin: 0.30    Blood cultures (drawn 12/6): NGTD    RECOMMENDATIONS:     - recommend holding antibiotics for now, and re-culturing if patient becomes febrile  - recommend further workup for non-infectious as well as infectious etiology of fever, including CT chest/abd/pelvis with contrast, echocardiogram, EEG, HIV, RPR, HIV, rheumatoid factor, CK, JÚNIOR  - no need to treat candiduria at this time  - we will follow along            Thank you for your consult. I will follow-up with patient. Please contact us if you have any additional questions.    Emiyl Christine DO  Infectious Disease  [facility]    Subjective:     Principal Problem: Fever    HPI: Ms. Silvia Ornelas is a 68 year old female with severe baseline dementia, HTN, hyperlipidemia, atrial  fibrillation, hypothyroidism, multiple abdominal surgeries with adhesions and SBO, and GERD who was transferred to [facility] for evaluation of persistent fevers and leukocytosis without identified source. History is obtained from chart review as patient is nonverbal.     Ms. Ornelas was recently admitted to [facility] on 11/10/18 for suspected aspiration pneumonia and treated with Vancomycin and Zosyn. She was discharged back to nursing home on 11/16. She then presented to the hospital again on 11/18/18 for nausea and vomiting. She was found to have high-grade small bowel obstruction and bilateral lower lobe lung consolidations. She was treated again for pneumonia with Linezolid and Cefepime. Her SBO was managed by hgeneral surgery with NG decompression.     On 11/22, patient was started on a five day course of Gentamicin. On 11/23, she developed WBC of 18 and fever with Tmax 101 F. On 11/27, patient developed fever of 107 F rectally, as well as new rigidity, hyperreflexia, and fine tremor with WBC 28 and lactic acid of 3. She was treated with Dantrolene for presumed neuroleptic malignant syndrome. Linezolid was discontinued. CT head was reportedly normal. Patient was then started on Vancomycin and continued Cefepime.     On 12/3, antibiotics were stopped. On 12/4, patient again had fever of 101-102 F and was rigid, diaphoretic, and had decline in mental status. Her alvarez catheter was exchanged, blood and urine cultures were repeated. An LP was performed, with a negative gram stain. Other results were still pending at time of transfer. On 12/5, Diflucan was added for finding of candida in urine. Patient was then transferred to [facility] on 12/6 for ID consultation as well as surgical evaluation for feeding tube placement.     Patient was started on IV Vancomycin and Zosyn upon arrival to [facility] and continued to have fever with Tmax 101.4. Urinalysis was performed with rare yeast, trace leukocyte esterase, negative nitrites,  and occasional bacteria. CXR was obtained with mild opacification of the RUL. WBC was initially 17. Procalcitonin was 0.3, ESR was 113, and CRP was 159. Lactic acid was initially 2.4 and trended down to 1.2.     Past Medical History:   Diagnosis Date    Atrial fibrillation     Dementia     GERD (gastroesophageal reflux disease)     HLD (hyperlipidemia)     Hypertension     Hypothyroid     Thyroid disease        Past Surgical History:   Procedure Laterality Date    ABDOMINAL SURGERY      EXPLORATORY LAPAROTOMY W/ BOWEL RESECTION      ILEOSTOMY      PARTIAL GASTRECTOMY      SPLENECTOMY, TOTAL         Review of patient's allergies indicates:   Allergen Reactions    Sulfa (sulfonamide antibiotics) Other (See Comments)     Severe reaction type unknown        Medications:  Medications Prior to Admission   Medication Sig    aspirin 81 MG Chew Take 81 mg by mouth once daily.    atorvastatin (LIPITOR) 10 MG tablet Take 10 mg by mouth once daily.    bisoprolol (ZEBETA) 5 MG tablet Take 5 mg by mouth once daily.    donepezil (ARICEPT) 10 MG tablet Take 10 mg by mouth every evening.    levothyroxine (SYNTHROID) 25 MCG tablet Take 25 mcg by mouth once daily.    pantoprazole (PROTONIX) 40 MG tablet Take 40 mg by mouth once daily.    risperiDONE (RISPERDAL) 1 MG tablet Take 1 mg by mouth 2 (two) times daily.    rivastigmine (EXELON) 9.5 mg/24 hr PT24 Place 1 patch onto the skin once daily.     Antibiotics (From admission, onward)    Start     Stop Route Frequency Ordered    12/06/18 1900  vancomycin in dextrose 5 % 1 gram/250 mL IVPB 1,000 mg  (Vancomycin IVPB with levels panel)      -- IV Every 24 hours (non-standard times) 12/06/18 1752 12/06/18 1900  piperacillin-tazobactam 4.5 g in sodium chloride 0.9% 100 mL IVPB (ready to mix system)      -- IV Every 8 hours (non-standard times) 12/06/18 1752        Antifungals (From admission, onward)    None        Antivirals (From admission, onward)    None              There is no immunization history on file for this patient.    Family History     Family history is unknown by patient.        Social History     Socioeconomic History    Marital status: Other     Spouse name: None    Number of children: None    Years of education: None    Highest education level: None   Social Needs    Financial resource strain: None    Food insecurity - worry: None    Food insecurity - inability: None    Transportation needs - medical: None    Transportation needs - non-medical: None   Occupational History    None   Tobacco Use    Smoking status: Never Smoker    Smokeless tobacco: Never Used   Substance and Sexual Activity    Alcohol use: No     Frequency: Never    Drug use: No    Sexual activity: Not Currently   Other Topics Concern    None   Social History Narrative    None     Review of Systems   Unable to perform ROS: Dementia     Objective:     Vital Signs (Most Recent):  Temp: 100.2 °F (37.9 °C) (12/07/18 0715)  Pulse: 87 (12/07/18 0715)  Resp: 18 (12/07/18 0715)  BP: 137/68 (12/07/18 0715)  SpO2: 95 % (12/07/18 0438) Vital Signs (24h Range):  Temp:  [97.7 °F (36.5 °C)-101.4 °F (38.6 °C)] 100.2 °F (37.9 °C)  Pulse:  [] 87  Resp:  [18-24] 18  SpO2:  [92 %-96 %] 95 %  BP: (126-155)/(61-82) 137/68     Weight: 55.7 kg (122 lb 12.7 oz)  Body mass index is 22.46 kg/m².    Estimated Creatinine Clearance: 43.8 mL/min (based on SCr of 1 mg/dL).    Physical Exam   Constitutional: No distress.   Elderly, chronically-ill appearing white female   HENT:   Head: Normocephalic and atraumatic.   Mouth/Throat: Oropharynx is clear and moist.   Eyes: Conjunctivae are normal. No scleral icterus.   Neck: Normal range of motion.   Cardiovascular: Exam reveals no friction rub.   No murmur heard.  tachycardic   Pulmonary/Chest: Effort normal and breath sounds normal. No stridor. No respiratory distress. She has no wheezes.   Abdominal: Soft. Bowel sounds are normal. She exhibits no  distension. There is no tenderness.   Musculoskeletal:   Stiffness on passive ROM of arms. Resting tremor noted   Lymphadenopathy:     She has no cervical adenopathy.   Neurological: She is alert.   Awake, alert, nonverbal but tracks with eyes   Skin: Skin is warm and dry. No rash noted. She is not diaphoretic.       Significant Labs:   CBC:   Recent Labs   Lab 12/06/18  1255 12/07/18  0456   WBC 17.81* 12.96*   HGB 8.6* 8.4*   HCT 27.3* 26.8*   * 353*     CMP:   Recent Labs   Lab 12/06/18  1255  12/07/18  0006 12/07/18  0456 12/07/18  0801   *   < > 152* 151* 152*   K 3.6   < > 3.2* 3.4* 3.4*   *   < > 112* 111* 111*   CO2 24   < > 23 24 25   GLU 97   < > 123* 127* 120*   BUN 17   < > 17 16 15   CREATININE 0.8   < > 1.0 1.0 1.0   CALCIUM 9.6   < > 9.2 8.9 9.0   PROT 7.5  --   --   --   --    ALBUMIN 2.9*  --   --   --   --    BILITOT 0.4  --   --   --   --    ALKPHOS 104  --   --   --   --    AST 20  --   --   --   --    ALT 30  --   --   --   --    ANIONGAP 18*   < > 17* 16 16   EGFRNONAA >60.0   < > 58.8* 58.8* 58.8*    < > = values in this interval not displayed.       Significant Imaging: I have reviewed all pertinent imaging results/findings within the past 24 hours.

## 2018-12-08 PROBLEM — R29.2 GENERALIZED HYPERREFLEXIA: Status: ACTIVE | Noted: 2018-12-08

## 2018-12-08 PROBLEM — M62.40 SUSTAINED INCREASED MUSCLE TONE: Status: ACTIVE | Noted: 2018-12-08

## 2018-12-08 LAB
ANION GAP SERPL CALC-SCNC: 11 MMOL/L
ANION GAP SERPL CALC-SCNC: 12 MMOL/L
ANION GAP SERPL CALC-SCNC: 16 MMOL/L
BASOPHILS # BLD AUTO: 0.04 K/UL
BASOPHILS NFR BLD: 0.4 %
BUN SERPL-MCNC: 11 MG/DL
BUN SERPL-MCNC: 12 MG/DL
BUN SERPL-MCNC: 12 MG/DL
BUN SERPL-MCNC: 13 MG/DL
BUN SERPL-MCNC: 13 MG/DL
CALCIUM SERPL-MCNC: 8.3 MG/DL
CALCIUM SERPL-MCNC: 8.6 MG/DL
CALCIUM SERPL-MCNC: 8.7 MG/DL
CALCIUM SERPL-MCNC: 8.7 MG/DL
CALCIUM SERPL-MCNC: 8.9 MG/DL
CHLORIDE SERPL-SCNC: 107 MMOL/L
CHLORIDE SERPL-SCNC: 107 MMOL/L
CHLORIDE SERPL-SCNC: 109 MMOL/L
CHLORIDE SERPL-SCNC: 110 MMOL/L
CHLORIDE SERPL-SCNC: 112 MMOL/L
CK SERPL-CCNC: 66 U/L
CO2 SERPL-SCNC: 22 MMOL/L
CO2 SERPL-SCNC: 24 MMOL/L
CO2 SERPL-SCNC: 25 MMOL/L
CO2 SERPL-SCNC: 26 MMOL/L
CO2 SERPL-SCNC: 26 MMOL/L
CREAT SERPL-MCNC: 0.8 MG/DL
CREAT SERPL-MCNC: 0.9 MG/DL
DIFFERENTIAL METHOD: ABNORMAL
EOSINOPHIL # BLD AUTO: 0.2 K/UL
EOSINOPHIL NFR BLD: 1.6 %
ERYTHROCYTE [DISTWIDTH] IN BLOOD BY AUTOMATED COUNT: 17.3 %
EST. GFR  (AFRICAN AMERICAN): >60 ML/MIN/1.73 M^2
EST. GFR  (NON AFRICAN AMERICAN): >60 ML/MIN/1.73 M^2
GLUCOSE SERPL-MCNC: 102 MG/DL
GLUCOSE SERPL-MCNC: 142 MG/DL
GLUCOSE SERPL-MCNC: 77 MG/DL
GLUCOSE SERPL-MCNC: 98 MG/DL
GLUCOSE SERPL-MCNC: 98 MG/DL
HCT VFR BLD AUTO: 25.3 %
HGB BLD-MCNC: 8 G/DL
IMM GRANULOCYTES # BLD AUTO: 0.04 K/UL
IMM GRANULOCYTES NFR BLD AUTO: 0.4 %
LYMPHOCYTES # BLD AUTO: 2.3 K/UL
LYMPHOCYTES NFR BLD: 23.3 %
MAGNESIUM SERPL-MCNC: 1.5 MG/DL
MCH RBC QN AUTO: 30.8 PG
MCHC RBC AUTO-ENTMCNC: 31.6 G/DL
MCV RBC AUTO: 97 FL
MONOCYTES # BLD AUTO: 1.1 K/UL
MONOCYTES NFR BLD: 10.6 %
NEUTROPHILS # BLD AUTO: 6.3 K/UL
NEUTROPHILS NFR BLD: 63.7 %
NRBC BLD-RTO: 0 /100 WBC
PHOSPHATE SERPL-MCNC: 2 MG/DL
PLATELET # BLD AUTO: 339 K/UL
PMV BLD AUTO: 12.2 FL
POTASSIUM SERPL-SCNC: 4.4 MMOL/L
POTASSIUM SERPL-SCNC: 4.5 MMOL/L
POTASSIUM SERPL-SCNC: 4.5 MMOL/L
POTASSIUM SERPL-SCNC: 4.7 MMOL/L
POTASSIUM SERPL-SCNC: 5 MMOL/L
RBC # BLD AUTO: 2.6 M/UL
RPR SER QL: NORMAL
SODIUM SERPL-SCNC: 145 MMOL/L
SODIUM SERPL-SCNC: 145 MMOL/L
SODIUM SERPL-SCNC: 147 MMOL/L
WBC # BLD AUTO: 9.95 K/UL

## 2018-12-08 PROCEDURE — 99222 1ST HOSP IP/OBS MODERATE 55: CPT | Mod: GC,,, | Performed by: PSYCHIATRY & NEUROLOGY

## 2018-12-08 PROCEDURE — 80048 BASIC METABOLIC PNL TOTAL CA: CPT | Mod: 91

## 2018-12-08 PROCEDURE — 80048 BASIC METABOLIC PNL TOTAL CA: CPT

## 2018-12-08 PROCEDURE — 83735 ASSAY OF MAGNESIUM: CPT

## 2018-12-08 PROCEDURE — 85025 COMPLETE CBC W/AUTO DIFF WBC: CPT

## 2018-12-08 PROCEDURE — 63600175 PHARM REV CODE 636 W HCPCS: Performed by: STUDENT IN AN ORGANIZED HEALTH CARE EDUCATION/TRAINING PROGRAM

## 2018-12-08 PROCEDURE — 25000003 PHARM REV CODE 250: Performed by: STUDENT IN AN ORGANIZED HEALTH CARE EDUCATION/TRAINING PROGRAM

## 2018-12-08 PROCEDURE — 99222 PR INITIAL HOSPITAL CARE,LEVL II: ICD-10-PCS | Mod: GC,,, | Performed by: PSYCHIATRY & NEUROLOGY

## 2018-12-08 PROCEDURE — 36415 COLL VENOUS BLD VENIPUNCTURE: CPT

## 2018-12-08 PROCEDURE — 99233 PR SUBSEQUENT HOSPITAL CARE,LEVL III: ICD-10-PCS | Mod: GC,,, | Performed by: STUDENT IN AN ORGANIZED HEALTH CARE EDUCATION/TRAINING PROGRAM

## 2018-12-08 PROCEDURE — 82550 ASSAY OF CK (CPK): CPT

## 2018-12-08 PROCEDURE — 20600001 HC STEP DOWN PRIVATE ROOM

## 2018-12-08 PROCEDURE — 99232 SBSQ HOSP IP/OBS MODERATE 35: CPT | Mod: ,,, | Performed by: INTERNAL MEDICINE

## 2018-12-08 PROCEDURE — 84100 ASSAY OF PHOSPHORUS: CPT

## 2018-12-08 PROCEDURE — 99233 SBSQ HOSP IP/OBS HIGH 50: CPT | Mod: GC,,, | Performed by: STUDENT IN AN ORGANIZED HEALTH CARE EDUCATION/TRAINING PROGRAM

## 2018-12-08 PROCEDURE — 99232 PR SUBSEQUENT HOSPITAL CARE,LEVL II: ICD-10-PCS | Mod: ,,, | Performed by: INTERNAL MEDICINE

## 2018-12-08 PROCEDURE — 25500020 PHARM REV CODE 255: Performed by: STUDENT IN AN ORGANIZED HEALTH CARE EDUCATION/TRAINING PROGRAM

## 2018-12-08 RX ORDER — NAPROXEN SODIUM 220 MG/1
81 TABLET, FILM COATED ORAL DAILY
Status: DISCONTINUED | OUTPATIENT
Start: 2018-12-08 | End: 2018-12-08

## 2018-12-08 RX ORDER — MAGNESIUM SULFATE HEPTAHYDRATE 40 MG/ML
2 INJECTION, SOLUTION INTRAVENOUS ONCE
Status: COMPLETED | OUTPATIENT
Start: 2018-12-08 | End: 2018-12-08

## 2018-12-08 RX ORDER — NAPROXEN SODIUM 220 MG/1
81 TABLET, FILM COATED ORAL DAILY
Status: DISCONTINUED | OUTPATIENT
Start: 2018-12-08 | End: 2018-12-18

## 2018-12-08 RX ORDER — BISOPROLOL FUMARATE 5 MG/1
5 TABLET, FILM COATED ORAL DAILY
Status: DISCONTINUED | OUTPATIENT
Start: 2018-12-08 | End: 2018-12-08

## 2018-12-08 RX ADMIN — HEPARIN SODIUM 5000 UNITS: 5000 INJECTION, SOLUTION INTRAVENOUS; SUBCUTANEOUS at 09:12

## 2018-12-08 RX ADMIN — HEPARIN SODIUM 5000 UNITS: 5000 INJECTION, SOLUTION INTRAVENOUS; SUBCUTANEOUS at 08:12

## 2018-12-08 RX ADMIN — MAGNESIUM SULFATE HEPTAHYDRATE 1 G: 500 INJECTION, SOLUTION INTRAMUSCULAR; INTRAVENOUS at 11:12

## 2018-12-08 RX ADMIN — IOHEXOL 75 ML: 350 INJECTION, SOLUTION INTRAVENOUS at 04:12

## 2018-12-08 RX ADMIN — POTASSIUM PHOSPHATE, MONOBASIC AND POTASSIUM PHOSPHATE, DIBASIC 20 MMOL: 224; 236 INJECTION, SOLUTION, CONCENTRATE INTRAVENOUS at 11:12

## 2018-12-08 RX ADMIN — POTASSIUM CHLORIDE 20 MEQ: 14.9 INJECTION, SOLUTION INTRAVENOUS at 01:12

## 2018-12-08 RX ADMIN — MAGNESIUM SULFATE IN WATER 2 G: 40 INJECTION, SOLUTION INTRAVENOUS at 08:12

## 2018-12-08 RX ADMIN — SODIUM CHLORIDE, SODIUM LACTATE, POTASSIUM CHLORIDE, AND CALCIUM CHLORIDE 500 ML: .6; .31; .03; .02 INJECTION, SOLUTION INTRAVENOUS at 08:12

## 2018-12-08 NOTE — ASSESSMENT & PLAN NOTE
· Differentials here include recurrent aspiration pneumonia secondary to dysphagia, bacteremia, and UTI.  · Admitted at OSH on 11/18 for high-grade SBO with recurrent bilateral pneumonia on CT scan on 11/26 at OSH.   · Hospital course at OSH complicated by symptoms of serotonin syndrome vs NMS with fever up to 107, status post Dantrolene, and contributed to Linezolid usage partly.   · Status post multiple courses of broad spectrum coverage including Linezolid, Cefepime, and Gentamicin; restarted on 12/4 with Vancomycin, Cefepime, and Diflucan after re-developing fevers. Urine culture at OSH positive for Candida.   · PICC line placed at OSH; unclear when inserted; will attempt to contact OSH as not clear in transfer paperwork.   · Admission labs notable for leukocytosis of 17 with 80% granulocytes, and lactic of 2.9; restarted Vanc/Zosyn on 12/6.  · Afebrile overnight. Last fever 101.4 on 12/6  · Leukocytosis resolved this morning. Lactate 1.2 as of 12/7.   · CXR here unremarkable.     · Blood cultures ordered. UA unremarkable for infection.    · CT A/P (12/7) without evidence of abdominal infection, showed expected post-operative changes, but showed possible RLL consolidation concerning for possible aspiration pneumonia vs pneumonitis.    · ID consulted given persistent fevers without source status post multiple broad spectrum antibiotics; recommending holding all antibiotics after 12/7 and re-culturing if patient spikes fever again. Based on CT findings, have low threshold for starting Unasyn if patient decompensates.   · Consulted neurology for mental status changes; considering NMS vs serotonin syndrome as possible explanation for fever, vital signs, and mental status changes.

## 2018-12-08 NOTE — CONSULTS
"[facility]  General Surgery  Consult Note    Patient Name: Silvia Ornelas  MRN: [id number]  Code Status: Full Code  Admission Date: 12/6/2018  Hospital Length of Stay: 1 days  Attending Physician: Humberto Kingston MD  Primary Care Provider: Primary Doctor No    Patient information was obtained from patient, past medical records and ER records.     Inpatient consult to General Surgery  Consult performed by: Delia Rome MD  Consult ordered by: Kelvin Mcadams MD        Subjective:     Principal Problem: Fever    History of Present Illness: Ms Ornelas is a 69 yo F transferred from [facility] for fever of unknown source requesting ID and general surgery consultations for workup of fevers and placement of PEG in a patient with extensive surgical history.    From primary team note:     She has a PMH of severe dementia (baseline status unknown), Afib (rate controlled, not on anticoagulation), HTN, HLD, GERD, and hypothyroidism.     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      History obtained form OS paper chart review, secondary to patient's mental status.      She was hospitalized at [facility] on 11/10/18 for suspected aspiration pneumonia; status post treatment with Vanc/Zosyn. She was noted to have abnormal barium swallow at that time.   PEG tube placement again discussed with family; they declined.    Discharged back to nursing home on 11/16.      She presented again on 11/18/18 " for new onset of nausea and vomiting. On presentation, labs significant for BRODY (BUN 24, Cr 2.6). CT A/P showed high-grade small bowel obstruction and bilateral lower lobe consolidations. She was started on Linezolid and Cefepime for suspected bilateral aspiration pneumonia. Her SBO was managed by general surgery with conservative measures, including NG decompression, pain medications, and IVF.  She then began developing leukocytosis to 18, with new fevers (Tmax 101) on 11/23; 5 day course of Gentamicin was added on 11/22.     On 11/26, she was noted to be passing gas and having bowel movements, but continued to have high NG bilious outptut. Repeat CT showed resolving SBO, but continued bilateral lower lobe consolidations. On 11/27, she spiked a fever to 107 (rectal probe) and developed new onset of extremity rigidity, hyperreflexia, and fine tremor, associated with worsening leukocytosis to 28, and lactic of 3. She was assessed to have serotonin syndrome secondary vs NMS due to possible combination of Linezolid, Dilaudid, Fentanyl, and Zofran. She received 140 mg of Dantrolene for possible NMS with symptom improvement; Linezolid discontinued. Patient's antibiotics switched to vancomycin and cefepime for aspiration pneumonitis, WBC was downtrending, and both antibiotics stopped on 12/3 after completed of 6-7 day antibiotic course.  Patient noted to be alert, conversant and oriented to self and with WBC downtrending, antibiotics stopped. Step down from ICU.     Overnight on 12/4, she had resumption of fevers (Tmax 101-102) with again features again of rigidity. Mental status change declined again with minimal  verbal response; patient noted to be ill appearing and diaphoretic.  Medications reviewed with pharmacy to ensure no active medications might be precipitating Serotonin Syndrome. CT head was unremarkable. Blood and urine cultures repeated. Hoover catheter exchanged. LP performed; gram stain negative, but other  analyses pending. In discussion of case, decision made to watch patient overnight and re-eval on 12/5. On 12/5, her Vancomycin and Cefepime restarted, also on Diflucan for urine culture positive for Candida.      Additionally, she was noted to have severe oropharyngeal dysphagia. She failed 2 Modified barium swallows, failed again on 12/5; OSH recommendations were for non-oral feeding. She initially had J-tube after last abdominal surgery, but fell out without attempt on repair. GI tried to place a PEG 1 year ago; but was unable.  IR would not want to place J-tube. General surgery at [facility] declined to operate secondary to complex anatomy.      NG tube was in for bulk of hospitalization for decompression and then tube feedings, but removed for concern of nasopharyngeal erosions.           No current facility-administered medications on file prior to encounter.      Current Outpatient Medications on File Prior to Encounter   Medication Sig    aspirin 81 MG Chew Take 81 mg by mouth once daily.    atorvastatin (LIPITOR) 10 MG tablet Take 10 mg by mouth once daily.    bisoprolol (ZEBETA) 5 MG tablet Take 5 mg by mouth once daily.    donepezil (ARICEPT) 10 MG tablet Take 10 mg by mouth every evening.    levothyroxine (SYNTHROID) 25 MCG tablet Take 25 mcg by mouth once daily.    pantoprazole (PROTONIX) 40 MG tablet Take 40 mg by mouth once daily.    risperiDONE (RISPERDAL) 1 MG tablet Take 1 mg by mouth 2 (two) times daily.    rivastigmine (EXELON) 9.5 mg/24 hr PT24 Place 1 patch onto the skin once daily.       Review of patient's allergies indicates:   Allergen Reactions    Sulfa (sulfonamide antibiotics) Other (See Comments)     Severe reaction type unknown        Past Medical History:   Diagnosis Date    Atrial fibrillation     Dementia     GERD (gastroesophageal reflux disease)     HLD (hyperlipidemia)     Hypertension     Hypothyroid     Thyroid disease      Past Surgical History:   Procedure  Laterality Date    ABDOMINAL SURGERY      EXPLORATORY LAPAROTOMY W/ BOWEL RESECTION      ILEOSTOMY      PARTIAL GASTRECTOMY      SPLENECTOMY, TOTAL       Family History     Family history is unknown by patient.        Tobacco Use    Smoking status: Never Smoker    Smokeless tobacco: Never Used   Substance and Sexual Activity    Alcohol use: No     Frequency: Never    Drug use: No    Sexual activity: Not Currently     Review of Systems     Negative except above.     Objective:     Vital Signs (Most Recent):  Temp: 97.5 °F (36.4 °C) (12/07/18 1604)  Pulse: (!) 111 (12/07/18 1604)  Resp: 18 (12/07/18 1604)  BP: 137/71 (12/07/18 1604)  SpO2: (!) 94 % (12/07/18 1604) Vital Signs (24h Range):  Temp:  [97.5 °F (36.4 °C)-101.4 °F (38.6 °C)] 97.5 °F (36.4 °C)  Pulse:  [] 111  Resp:  [18-20] 18  SpO2:  [94 %-96 %] 94 %  BP: (126-143)/(61-71) 137/71     Weight: 55.3 kg (122 lb)  Body mass index is 21.61 kg/m².    Physical Exam     General: Severely Demented  CV: RRR  Pulm: On NC  Abd: soft, non distended, non tender. Midline laparotomy scar. G and J tube scars. LLQ ileostomy and LUQ mucous fistula?  Ext: wwp      Significant Labs:    CBC:   Recent Labs   Lab 12/07/18  0456   WBC 12.96*   RBC 2.69*   HGB 8.4*   HCT 26.8*   *   *   MCH 31.2*   MCHC 31.3*     CMP:   Recent Labs   Lab 12/06/18  1255  12/07/18  1530   GLU 97   < > 146*   CALCIUM 9.6   < > 8.7   ALBUMIN 2.9*  --   --    PROT 7.5  --   --    *   < > 148*   K 3.6   < > 2.7*   CO2 24   < > 26   *   < > 108   BUN 17   < > 14   CREATININE 0.8   < > 1.0   ALKPHOS 104  --   --    ALT 30  --   --    AST 20  --   --    BILITOT 0.4  --   --     < > = values in this interval not displayed.         Assessment/Plan:     Dysphagia    Ms Ornelas is a 67 yo F transferred from [facility] for fever of unknown source requesting ID and general surgery consultations for workup of fevers and placement of PEG in a patient with extensive  surgical history.    - Patient still presenting with fevers, leukocytosis, electrolyte abnormalities and currently pending a CT C/A/P.   - Would wait until all workup is done, CT scan results and afebrile. Can give nutrition through NJT which is smaller and causes less nasopharyngeal erosions.   - Recommend palliative care consult, family recently refusing for a PEG tube, needs goals of care discussion.   - Will continue to follow and schedule PEG when patient's status has improved.          VTE Risk Mitigation (From admission, onward)        Ordered     heparin (porcine) injection 5,000 Units  Every 12 hours      12/07/18 1400     Place sequential compression device  Until discontinued      12/06/18 1138     IP VTE LOW RISK PATIENT  Once      12/06/18 1138          Delia Hernandez MD  General Surgery PGY V  Beeper: [phone]

## 2018-12-08 NOTE — ASSESSMENT & PLAN NOTE
· Baseline status unknown. Upper level spoke with daughter Terri (phone number on sticky note). She states that since 2016 her mother's mental status has been declining she used to be able to do all her ADLs until she started having her abdominal surgeries due to a volvulus.  She was placed in a nursing home in 2016. In the last 4 months she has been less verbal and presented to the OSH for a chocking episode which lead to a cardiac arrest.  · Patient was noted to be awake, alert, and conversing throughout hospital stay at OSH after resolution of possible serotonin syndrome in ICU.   · Noted to have mental status change associated with minimal verbal response after redevelopment of fevers. CT head and LP at OSH unremarkable.   · Noted to be awake and alert, but with disordered response to questions on admission, no change today.   · Cannot resume home dementia medications with aspiration precautions.   · Neurology consulted on 12/8 for mental status changes/worsening dementia; per recommendations, ordered MRI brain with/without contrast, and started EEG.

## 2018-12-08 NOTE — SUBJECTIVE & OBJECTIVE
No current facility-administered medications on file prior to encounter.      Current Outpatient Medications on File Prior to Encounter   Medication Sig    aspirin 81 MG Chew Take 81 mg by mouth once daily.    atorvastatin (LIPITOR) 10 MG tablet Take 10 mg by mouth once daily.    bisoprolol (ZEBETA) 5 MG tablet Take 5 mg by mouth once daily.    donepezil (ARICEPT) 10 MG tablet Take 10 mg by mouth every evening.    levothyroxine (SYNTHROID) 25 MCG tablet Take 25 mcg by mouth once daily.    pantoprazole (PROTONIX) 40 MG tablet Take 40 mg by mouth once daily.    risperiDONE (RISPERDAL) 1 MG tablet Take 1 mg by mouth 2 (two) times daily.    rivastigmine (EXELON) 9.5 mg/24 hr PT24 Place 1 patch onto the skin once daily.       Review of patient's allergies indicates:   Allergen Reactions    Sulfa (sulfonamide antibiotics) Other (See Comments)     Severe reaction type unknown        Past Medical History:   Diagnosis Date    Atrial fibrillation     Dementia     GERD (gastroesophageal reflux disease)     HLD (hyperlipidemia)     Hypertension     Hypothyroid     Thyroid disease      Past Surgical History:   Procedure Laterality Date    ABDOMINAL SURGERY      EXPLORATORY LAPAROTOMY W/ BOWEL RESECTION      ILEOSTOMY      PARTIAL GASTRECTOMY      SPLENECTOMY, TOTAL       Family History     Family history is unknown by patient.        Tobacco Use    Smoking status: Never Smoker    Smokeless tobacco: Never Used   Substance and Sexual Activity    Alcohol use: No     Frequency: Never    Drug use: No    Sexual activity: Not Currently     Review of Systems     Negative except above.     Objective:     Vital Signs (Most Recent):  Temp: 97.5 °F (36.4 °C) (12/07/18 1604)  Pulse: (!) 111 (12/07/18 1604)  Resp: 18 (12/07/18 1604)  BP: 137/71 (12/07/18 1604)  SpO2: (!) 94 % (12/07/18 1604) Vital Signs (24h Range):  Temp:  [97.5 °F (36.4 °C)-101.4 °F (38.6 °C)] 97.5 °F (36.4 °C)  Pulse:  [] 111  Resp:  [18-20]  18  SpO2:  [94 %-96 %] 94 %  BP: (126-143)/(61-71) 137/71     Weight: 55.3 kg (122 lb)  Body mass index is 21.61 kg/m².    Physical Exam     General: Severely Demented  CV: RRR  Pulm: On NC  Abd: soft, non distended, non tender. Midline laparotomy scar. G and J tube scars. LLQ ileostomy and LUQ mucous fistula?  Ext: wwp      Significant Labs:    CBC:   Recent Labs   Lab 12/07/18  0456   WBC 12.96*   RBC 2.69*   HGB 8.4*   HCT 26.8*   *   *   MCH 31.2*   MCHC 31.3*     CMP:   Recent Labs   Lab 12/06/18  1255  12/07/18  1530   GLU 97   < > 146*   CALCIUM 9.6   < > 8.7   ALBUMIN 2.9*  --   --    PROT 7.5  --   --    *   < > 148*   K 3.6   < > 2.7*   CO2 24   < > 26   *   < > 108   BUN 17   < > 14   CREATININE 0.8   < > 1.0   ALKPHOS 104  --   --    ALT 30  --   --    AST 20  --   --    BILITOT 0.4  --   --     < > = values in this interval not displayed.

## 2018-12-08 NOTE — ASSESSMENT & PLAN NOTE
This is a 68 year old female who has had complicated hospitalization over the past month with empiric treatment for fevers and leukocytosis with no definitive source identified (initially some concern for aspiration pneumonia). It is unclear at this time if the patient's continued fevers are due to infectious source or if there is a non-infectious etiology.     Workup to date:   LP: 4 WBC, glucose 118, protein 24, H. Flu negative, S. Pneumoniae negative, N. Meningitidis negative  CT Head: unremarkable  Urinalysis: rare yeast, trace leuk, occasional bacteria  CXR: mild opacification of RUL  Procalcitonin: 0.30  HIV: negative  CT: consolidation concerning for aspiration  TTE: no evidence of infection    Blood cultures (drawn 12/6): NGTD    RECOMMENDATIONS:     - recommend holding antibiotics for now, and re-culturing if patient becomes febrile  - recommend further workup for non-infectious as well as infectious etiology of fever, including EEG, RPR, rheumatoid factor, CK, JÚNIOR  - no need to treat candiduria at this time  - based on CT, fever maybe due to aspiration pna or pneumonitis   - her degree of dementia puts her at risk for continued aspiration

## 2018-12-08 NOTE — CONSULTS
[facility]  Neurology  Consult Note    Patient Name: Silvia Ornelas  MRN: [id number]  Admission Date: 12/6/2018  Hospital Length of Stay: 2 days  Code Status: Full Code   Attending Provider: Humberto Kingston MD   Consulting Provider: Ivette Peterson MD  Primary Care Physician: Primary Doctor No  Principal Problem:Fever    Inpatient consult to Neurology  Consult performed by: Ivette Peterson MD  Consult ordered by: Kelvin Mcadams MD         Subjective:     Chief Complaint: Fever w/ negative culture results, hyperreflexia, hypertonia and change in MS    HPI:   Patient is a 68 year old female w/ past medical history significant for PMH of severe dementia (baseline status unknown), Afib (rate controlled, not on anticoagulation), HTN, HLD, GERD, and hypothyroidism who was transferred from [facility] for FUO, requesting ID and general surgery consultations, on 12/06.    History obtained form OSH paper chart review, secondary to patient's mental status.    Patient initially hospitalized at [facility] on 11/10/18 for suspected aspiration pneumonia; status post treatment with Vanc/Zosyn. She was noted to have abnormal barium swallow at that time; PEG tube placement again discussed with family; they declined. Discharged back to nursing home on 11/16.    She presented again on 11/18/18 for new onset of nausea and vomiting. She was started on Linezolid and Cefepime for suspected bilateral aspiration pneumonia. She then began developing leukocytosis to 18, with new fevers (Tmax 101) on 11/23; 5 day course of Gentamicin was added on 11/22. On 11/27, she spiked a fever to 107 (rectal probe) and developed new onset of extremity rigidity, hyperreflexia, and fine tremor, associated with worsening leukocytosis to 28, and lactic of 3. She was assessed to have serotonin syndrome secondary vs NMS due to possible combination of Linezolid, Dilaudid, Fentanyl, and Zofran. She received 140 mg of Dantrolene  for possible NMS with symptom improvement; Linezolid discontinued, and ABx switched and stopped on 12/3 after completed of 6-7 day antibiotic course.  Patient noted to be alert, conversant and oriented to self and with WBC downtrending, antibiotics stopped. Step down from ICU.  On 12/4, she had resumption of fevers (Tmax 101-102) with again features again of rigidity. Mental status change declined again with minimal  verbal response; patient noted to be ill appearing and diaphoretic.  Medications reviewed with pharmacy at that time again for any possible correlation w/ Serotonin Syndrome.   CT head was unremarkable. Blood and urine cultures repeated. Hoover catheter exchanged.  LP performed; gram stain negative,QBC 1. Protein <40.  On 12/5, her Vancomycin and Cefepime restarted, also on Diflucan for urine culture positive for Candida.   Patient transferred to [facility] for work-up; noted to be non-verbal by the primary team w/ persistent rigidity, hyperreflexia and AMS.   Neurology consulted for assistance with this case.      Past Medical History:   Diagnosis Date    Atrial fibrillation     Dementia     GERD (gastroesophageal reflux disease)     HLD (hyperlipidemia)     Hypertension     Hypothyroid     Thyroid disease        Past Surgical History:   Procedure Laterality Date    ABDOMINAL SURGERY      EXPLORATORY LAPAROTOMY W/ BOWEL RESECTION      ILEOSTOMY      PARTIAL GASTRECTOMY      SPLENECTOMY, TOTAL         Review of patient's allergies indicates:   Allergen Reactions    Sulfa (sulfonamide antibiotics) Other (See Comments)     Severe reaction type unknown        Current Neurological Medications:  N/A     No current facility-administered medications on file prior to encounter.      Current Outpatient Medications on File Prior to Encounter   Medication Sig    aspirin 81 MG Chew Take 81 mg by mouth once daily.    atorvastatin (LIPITOR) 10 MG tablet Take 10 mg by mouth once daily.    bisoprolol (ZEBETA) 5  MG tablet Take 5 mg by mouth once daily.    donepezil (ARICEPT) 10 MG tablet Take 10 mg by mouth every evening.    levothyroxine (SYNTHROID) 25 MCG tablet Take 25 mcg by mouth once daily.    pantoprazole (PROTONIX) 40 MG tablet Take 40 mg by mouth once daily.    risperiDONE (RISPERDAL) 1 MG tablet Take 1 mg by mouth 2 (two) times daily.    rivastigmine (EXELON) 9.5 mg/24 hr PT24 Place 1 patch onto the skin once daily.     Family History     Family history is unknown by patient.        Tobacco Use    Smoking status: Never Smoker    Smokeless tobacco: Never Used   Substance and Sexual Activity    Alcohol use: No     Frequency: Never    Drug use: No    Sexual activity: Not Currently     Review of Systems   Unable to perform ROS: Mental status change   Constitutional: Positive for activity change, diaphoresis and fever.   HENT: Positive for trouble swallowing.    Gastrointestinal: Negative for nausea and vomiting.   Musculoskeletal:        Stiffness/rigidity    Neurological: Positive for tremors and speech difficulty.     Objective:     Vital Signs (Most Recent):  Temp: 97.6 °F (36.4 °C) (12/08/18 1551)  Pulse: 107 (12/08/18 1551)  Resp: 18 (12/08/18 1551)  BP: 133/82 (12/08/18 1551)  SpO2: 96 % (12/08/18 1551) Vital Signs (24h Range):  Temp:  [97.6 °F (36.4 °C)-99.7 °F (37.6 °C)] 97.6 °F (36.4 °C)  Pulse:  [] 107  Resp:  [16-18] 18  SpO2:  [92 %-100 %] 96 %  BP: (119-138)/(69-90) 133/82     Weight: 55.3 kg (122 lb)  Body mass index is 21.61 kg/m².    Physical Exam  General:  Well-developed, well-nourished, nad, minimally responsive at first. Opens eyes on command. Verbal, limited  HEENT:  NCAT, PERRLA, EOMI, oropharyngeal membrane bloody, noted broken tooth, unclear the chronicity. Noted wound of the lower lip  Neck:  Supple, rigid, but able to move neck from side to side  Resp:  Symmetric expansion, no increased wob  CVS:  No LE edema, peripheral pulses 2+ (radial, dorsalis pedis)  GI:  Abd soft,  non-distended, non-tender to palpation  Neurologic Exam:  Mental Status:  AAOx2.  Speech, scant, delayed, but able to answer few questions appropriately.  Cranial Nerves:   PERRLA, EOMI.  Facial movement, sensation intact and symmetric.  Palate raises symmetrically, tongue protrudes midline.    Motor:  Normal bulk and increased tone.  Moves all extremities on command, difficult to assess strength but she is antigravity throughout. Some contractures noted in b/l hands and feet  Sensory:  Intact to light touch at all extremities and face without inattention.    Reflexes:  Biceps, brachioradialis, patellar, Achilles 3+ and symmetric.  No ankle clonus, although hard to assess due to rigidity Coordination: Fine tremor notes on arm extension.  Gait:  Deferred          Significant Labs:   Hemoglobin A1c:   Recent Labs   Lab 12/06/18  1255   HGBA1C 5.6     Blood Culture: No results for input(s): LABBLOO in the last 48 hours.  BMP:   Recent Labs   Lab 12/07/18 0456 12/08/18 0347 12/08/18 1123 12/08/18  1203   *   < > 98 98 142*   *   < > 147* 147* 145   K 3.4*   < > 5.0 4.5 4.4   *   < > 112* 109 107   CO2 24   < > 24 26 26   BUN 16   < > 13 12 12   CREATININE 1.0   < > 0.8 0.8 0.8   CALCIUM 8.9   < > 8.7 8.9 8.7   MG 1.5*  --  1.5*  --   --     < > = values in this interval not displayed.     CBC:   Recent Labs   Lab 12/07/18 0456 12/08/18 0347   WBC 12.96* 9.95   HGB 8.4* 8.0*   HCT 26.8* 25.3*   * 339     CMP:   Recent Labs   Lab 12/07/18  0456 12/08/18  0347 12/08/18  1123 12/08/18  1203   *   < > 98 98 142*   *   < > 147* 147* 145   K 3.4*   < > 5.0 4.5 4.4   *   < > 112* 109 107   CO2 24   < > 24 26 26   BUN 16   < > 13 12 12   CREATININE 1.0   < > 0.8 0.8 0.8   CALCIUM 8.9   < > 8.7 8.9 8.7   MG 1.5*  --  1.5*  --   --    ANIONGAP 16   < > 11 12 12   EGFRNONAA 58.8*   < > >60.0 >60.0 >60.0    < > = values in this interval not displayed.     CSF Culture: No results  for input(s): CSFCULTURE in the last 48 hours.  CSF Studies: No results for input(s): ALIQUT, APPEARCSF, COLORCSF, CSFWBC, CSFRBC, GLUCCSF, LDHCSF, PROTEINCSF, VDRLCSF in the last 48 hours.  Inflammatory Markers: No results for input(s): SEDRATE, CRP, PROCAL in the last 48 hours.  POCT Glucose: No results for input(s): POCTGLUCOSE in the last 24 hours.  Prealbumin: No results for input(s): PREALBUMIN in the last 48 hours.  Respiratory Culture: No results for input(s): GSRESP, RESPIRATORYC in the last 48 hours.  Urine Culture: No results for input(s): LABURIN in the last 48 hours.  Urine Studies:   Recent Labs   Lab 12/06/18  2154   COLORU Yellow   APPEARANCEUA Clear   PHUR 5.0   SPECGRAV 1.010   PROTEINUA 1+*   GLUCUA 1+*   KETONESU 1+*   BILIRUBINUA Negative   OCCULTUA 3+*   NITRITE Negative   LEUKOCYTESUR Trace*   RBCUA 71*   WBCUA 4   BACTERIA Occasional   SQUAMEPITHEL 1   HYALINECASTS 0     All pertinent lab results from the past 24 hours have been reviewed.    Significant Imaging: I have reviewed all pertinent imaging results/findings within the past 24 hours.   MRI  PENDING     EEG  Pending final read; no evidence of NCSE or other epileptiform activity/seziures upon independent review     Assessment and Plan:     * Fever    Patient is a 67 yo female w/ past complicated medical history presents as a transfer from OSH w/ primary diagnosis of fever w/ culture negative workup ,rigidity, AMS w/ decreased responsiveness, hyperreflexia. Per records, patient was given Linezolid, Zofran and Fentanyl at one point of time at the OSH and had developed rapid onset above symptoms. From the notes, patient was presumed to have NMS vs SS and the offending agents were stopped as well as patient was give Dantrolene 140 mg - patient did show improvement in her muscle rigidity, which is expected.   She later developed similar symptoms on 12/04, and although her MAR was evaluated for any other offending agents, none were found. She  was transferred here on 12/06 for further work- up of the above symptoms.   Infectious work-up has been unremarkable.     Currently patient is responsive, does track and is able to answer questions, although significant delay occurs and they are scant, although accurate responses.     - Patient remains hyperreflexic, rigid w/ Increased tone throughout, afebrile in the past 24 hours, but borderline tachycardic  - Currently concerned for SS, as the above mentioned agents ( Zofran, Linezolid and Fentanyl) have all been associated with the development of this syndrome. Dantrolene would show improvement in patient's rigidity but would not be an appropriate treatment for SS.  - CPK ordered  - No mention of any neuroleptic medication on patient's OSH records  - Could consider cyproheptadine as the treatment modality for SS  - EEG ordered - upon review with staff no evidence of NCSE or diffuse cortical dysfunction; final read pending  - Would recommend obtaining MRI w/ and w/o as renal function stable  - Will continue to follow the patient; appreciate consult, please contact w/ any additional questions at this time   - Recommendations communicated to the primary team's staff physician, Dr. Kingston      Generalized hyperreflexia    - See principal problem      Sustained increased muscle tone    - See principal problem      Dementia without behavioral disturbance    - Unclear baseline  - Patient although verbal, unable to provide history  - No family at bedside at this time          VTE Risk Mitigation (From admission, onward)        Ordered     heparin (porcine) injection 5,000 Units  Every 12 hours      12/07/18 1400     Place sequential compression device  Until discontinued      12/06/18 1138     IP VTE LOW RISK PATIENT  Once      12/06/18 1138          Thank you for your consult. I will follow-up with patient. Please contact us if you have any additional questions.    Ivette Peterson MD  Neurology  [facility]  [place]

## 2018-12-08 NOTE — SUBJECTIVE & OBJECTIVE
Interval History: No events    Review of Systems   Unable to perform ROS: Dementia     Objective:     Vital Signs (Most Recent):  Temp: 98.6 °F (37 °C) (12/08/18 0735)  Pulse: 109 (12/08/18 1000)  Resp: 18 (12/08/18 0735)  BP: 132/79 (12/08/18 0735)  SpO2: (!) 94 % (12/08/18 0735) Vital Signs (24h Range):  Temp:  [97.5 °F (36.4 °C)-99.7 °F (37.6 °C)] 98.6 °F (37 °C)  Pulse:  [] 109  Resp:  [16-18] 18  SpO2:  [92 %-100 %] 94 %  BP: (119-138)/(71-90) 132/79     Weight: 55.3 kg (122 lb)  Body mass index is 21.61 kg/m².    Estimated Creatinine Clearance: 57.2 mL/min (based on SCr of 0.8 mg/dL).    Physical Exam   Constitutional: No distress.   Elderly, chronically-ill appearing white female   HENT:   Head: Normocephalic and atraumatic.   Mouth/Throat: Oropharynx is clear and moist.   Eyes: Conjunctivae are normal. No scleral icterus.   Neck: Normal range of motion.   Cardiovascular: Exam reveals no friction rub.   No murmur heard.  tachycardic   Pulmonary/Chest: Effort normal and breath sounds normal. No stridor. No respiratory distress. She has no wheezes.   Abdominal: Soft. Bowel sounds are normal. She exhibits no distension. There is no tenderness.   Musculoskeletal:   Stiffness on passive ROM of arms. Resting tremor noted   Lymphadenopathy:     She has no cervical adenopathy.   Neurological: She is alert.   Awake, alert, nonverbal but tracks with eyes   Skin: Skin is warm and dry. No rash noted. She is not diaphoretic.       Significant Labs: All pertinent labs within the past 24 hours have been reviewed.    Significant Imaging: I have reviewed all pertinent imaging results/findings within the past 24 hours.

## 2018-12-08 NOTE — PLAN OF CARE
Problem: Patient Care Overview  Goal: Plan of Care Review  Outcome: Ongoing (interventions implemented as appropriate)  POC reviewed at bedside. NG tube place awaiting xray confirmation. Safety maintained. Call light in reach.

## 2018-12-08 NOTE — ASSESSMENT & PLAN NOTE
Ms Ornelas is a 69 yo F transferred from [facility] for fever of unknown source requesting ID and general surgery consultations for workup of fevers and placement of PEG in a patient with extensive surgical history.    - Patient still presenting with fevers, leukocytosis, electrolyte abnormalities and currently pending a CT C/A/P.   - Would wait until all workup is done, CT scan results and afebrile. Can give nutrition through NJT which is smaller and causes less nasopharyngeal erosions.   - Recommend palliative care consult, family recently refusing for a PEG tube, needs goals of care discussion.   - Will continue to follow and schedule PEG when patient's status has improved.

## 2018-12-08 NOTE — ASSESSMENT & PLAN NOTE
- Unclear baseline  - Patient although verbal, unable to provide history  - No family at bedside at this time

## 2018-12-08 NOTE — PROGRESS NOTES
[facility]  Infectious Disease  Progress Note    Patient Name: Silvia Ornelas  MRN: [id number]  Admission Date: 12/6/2018  Length of Stay: 2 days  Attending Physician: Humberto Kingston MD  Primary Care Provider: Primary Doctor No    Isolation Status: No active isolations  Assessment/Plan:      * Fever    This is a 68 year old female who has had complicated hospitalization over the past month with empiric treatment for fevers and leukocytosis with no definitive source identified (initially some concern for aspiration pneumonia). It is unclear at this time if the patient's continued fevers are due to infectious source or if there is a non-infectious etiology.     Workup to date:   LP: 4 WBC, glucose 118, protein 24, H. Flu negative, S. Pneumoniae negative, N. Meningitidis negative  CT Head: unremarkable  Urinalysis: rare yeast, trace leuk, occasional bacteria  CXR: mild opacification of RUL  Procalcitonin: 0.30  HIV: negative  CT: consolidation concerning for aspiration  TTE: no evidence of infection    Blood cultures (drawn 12/6): NGTD    RECOMMENDATIONS:     - recommend holding antibiotics for now, and re-culturing if patient becomes febrile  - recommend further workup for non-infectious as well as infectious etiology of fever, including EEG, RPR, rheumatoid factor, CK, JÚNIOR  - no need to treat candiduria at this time  - based on CT, fever maybe due to aspiration pna or pneumonitis   - her degree of dementia puts her at risk for continued aspiration               Thank you for your consult. I will follow-up with patient. Please contact us if you have any additional questions.    Juan Jose Tong MD  Infectious Disease  [facility]    Subjective:     Principal Problem:Fever    HPI: Ms. Silvia Ornelas is a 68 year old female with severe baseline dementia, HTN, hyperlipidemia, atrial fibrillation, hypothyroidism, multiple abdominal surgeries with adhesions and SBO, and GERD who was  transferred [place] [facility] [place] of persistent fevers and leukocytosis without identified source. History is obtained from chart review as patient is nonverbal.     Ms. Ornelas was recently admitted to [facility] on 11/10/18 for suspected aspiration pneumonia and treated with Vancomycin and Zosyn. She was discharged back to nursing home on 11/16. She then presented to the hospital again on 11/18/18 for nausea and vomiting. She was found to have high-grade small bowel obstruction and bilateral lower lobe lung consolidations. She was treated again for pneumonia with Linezolid and Cefepime. Her SBO was managed by hgeneral surgery with NG decompression.     On 11/22, patient was started on a five day course of Gentamicin. On 11/23, she developed WBC of 18 and fever with Tmax 101 F. On 11/27, patient developed fever of 107 F rectally, as well as new rigidity, hyperreflexia, and fine tremor with WBC 28 and lactic acid of 3. She was treated with Dantrolene for presumed neuroleptic malignant syndrome. Linezolid was discontinued. CT head was reportedly normal. Patient was then started on Vancomycin and continued Cefepime.     On 12/3, antibiotics were stopped. On 12/4, patient again had fever of 101-102 F and was rigid, diaphoretic, and had decline in mental status. Her alvarez catheter was exchanged, blood and urine cultures were repeated. An LP was performed, with a negative gram stain. Other results were still pending at time of transfer. On 12/5, Diflucan was added for finding of candida in urine. Patient was then transferred to [facility] [place] for ID consultation as well as surgical evaluation for feeding tube placement.     Patient was started on IV Vancomycin and Zosyn upon arrival to [facility] and continued to have fever with Tmax 101.4. Urinalysis was performed with rare yeast, trace leukocyte esterase, negative nitrites, and occasional bacteria. CXR was obtained with mild opacification of the RUL. WBC was initially 17.  Procalcitonin was 0.3, ESR was 113, and CRP was 159. Lactic acid was initially 2.4 and trended down to 1.2.   Interval History: No events    Review of Systems   Unable to perform ROS: Dementia     Objective:     Vital Signs (Most Recent):  Temp: 98.6 °F (37 °C) (12/08/18 0735)  Pulse: 109 (12/08/18 1000)  Resp: 18 (12/08/18 0735)  BP: 132/79 (12/08/18 0735)  SpO2: (!) 94 % (12/08/18 0735) Vital Signs (24h Range):  Temp:  [97.5 °F (36.4 °C)-99.7 °F (37.6 °C)] 98.6 °F (37 °C)  Pulse:  [] 109  Resp:  [16-18] 18  SpO2:  [92 %-100 %] 94 %  BP: (119-138)/(71-90) 132/79     Weight: 55.3 kg (122 lb)  Body mass index is 21.61 kg/m².    Estimated Creatinine Clearance: 57.2 mL/min (based on SCr of 0.8 mg/dL).    Physical Exam   Constitutional: No distress.   Elderly, chronically-ill appearing white female   HENT:   Head: Normocephalic and atraumatic.   Mouth/Throat: Oropharynx is clear and moist.   Eyes: Conjunctivae are normal. No scleral icterus.   Neck: Normal range of motion.   Cardiovascular: Exam reveals no friction rub.   No murmur heard.  tachycardic   Pulmonary/Chest: Effort normal and breath sounds normal. No stridor. No respiratory distress. She has no wheezes.   Abdominal: Soft. Bowel sounds are normal. She exhibits no distension. There is no tenderness.   Musculoskeletal:   Stiffness on passive ROM of arms. Resting tremor noted   Lymphadenopathy:     She has no cervical adenopathy.   Neurological: She is alert.   Awake, alert, nonverbal but tracks with eyes   Skin: Skin is warm and dry. No rash noted. She is not diaphoretic.       Significant Labs: All pertinent labs within the past 24 hours have been reviewed.    Significant Imaging: I have reviewed all pertinent imaging results/findings within the past 24 hours.

## 2018-12-08 NOTE — ASSESSMENT & PLAN NOTE
Patient is a 67 yo female w/ past complicated medical history presents as a transfer from OSH w/ primary diagnosis of fever w/ culture negative workup ,rigidity, AMS w/ decreased responsiveness, hyperreflexia. Per records, patient was given Linezolid, Zofran and Fentanyl at one point of time at the OSH and had developed rapid onset above symptoms. From the notes, patient was presumed to have NMS vs SS and the offending agents were stopped as well as patient was give Dantrolene 140 mg - patient did show improvement in her muscle rigidity, which is expected.   She later developed similar symptoms on 12/04, and although her MAR was evaluated for any other offending agents, none were found. She was transferred here on 12/06 for further work- up of the above symptoms.   Infectious work-up has been unremarkable.     Currently patient is responsive, does track and is able to answer questions, although significant delay occurs and they are scant, although accurate responses.     - Patient remains hyperreflexic, rigid w/ Increased tone throughout, afebrile in the past 24 hours, but borderline tachycardic  - Currently concerned for SS, as the above mentioned agents ( Zofran, Linezolid and Fentanyl) have all been associated with the development of this syndrome. Dantrolene would show improvement in patient's rigidity but would not be an appropriate treatment for SS.  - CPK ordered  - No mention of any neuroleptic medication on patient's OSH records  - Could consider cyproheptadine as the treatment modality for SS  - EEG ordered - upon review with staff no evidence of NCSE or diffuse cortical dysfunction; final read pending  - Would recommend obtaining MRI w/ and w/o as renal function stable  - Will continue to follow the patient; appreciate consult, please contact w/ any additional questions at this time   - Recommendations communicated to the primary team's staff physician, Dr. Kingston

## 2018-12-08 NOTE — SUBJECTIVE & OBJECTIVE
Interval History: Patient seen at bedside this morning; remains awake, alert, but with disordered and delayed response to questioning, unchanged from yesterday.     Review of Systems   Unable to perform ROS: Dementia     Objective:     Vital Signs (Most Recent):  Temp: 98.4 °F (36.9 °C) (12/08/18 1138)  Pulse: 99 (12/08/18 1500)  Resp: 17 (12/08/18 1138)  BP: 129/69 (12/08/18 1138)  SpO2: 95 % (12/08/18 1138) Vital Signs (24h Range):  Temp:  [97.5 °F (36.4 °C)-99.7 °F (37.6 °C)] 98.4 °F (36.9 °C)  Pulse:  [] 99  Resp:  [16-18] 17  SpO2:  [92 %-100 %] 95 %  BP: (119-138)/(69-90) 129/69     Weight: 55.3 kg (122 lb)  Body mass index is 21.61 kg/m².    Intake/Output Summary (Last 24 hours) at 12/8/2018 1519  Last data filed at 12/8/2018 0700  Gross per 24 hour   Intake 0 ml   Output 600 ml   Net -600 ml      Physical Exam   Constitutional: She is oriented to person, place, and time. She appears well-developed and well-nourished. No distress.   HENT:   Head: Normocephalic and atraumatic.   Mouth/Throat: Uvula is midline, oropharynx is clear and moist and mucous membranes are normal. Abnormal dentition. No dental abscesses. No tonsillar exudate.   Eyes: Conjunctivae are normal. Pupils are equal, round, and reactive to light. No scleral icterus.   Neck: Neck supple. No JVD present.   Cardiovascular: Regular rhythm, normal heart sounds and normal pulses. Tachycardia present.   No murmur heard.  Pulmonary/Chest: Effort normal and breath sounds normal. No respiratory distress. She has no decreased breath sounds. She has no rales.   Abdominal: Soft. Normal appearance and bowel sounds are normal. She exhibits no distension. There is no tenderness.   Colostomy bag in RLQ; multiple well-healed previous abdominal surgery scars.    Genitourinary:   Genitourinary Comments: Hoover inserted.    Musculoskeletal:   There is atrophy of the muscles of the bilateral lower extremities distal to the knee.    Neurological: She is alert  and oriented to person, place, and time. She displays no tremor. No cranial nerve deficit. GCS eye subscore is 4. GCS verbal subscore is 3. GCS motor subscore is 6. She displays no Babinski's sign on the right side. She displays no Babinski's sign on the left side.   There is contracture of the bilateral feel and upper extremities at the elbow with rigidity of movement at those joints.    Skin: Skin is warm and dry. No bruising and no rash noted. She is not diaphoretic.   Skin is flushed around the lower face and anterior neck.      Significant Labs:   Blood Culture:   No results for input(s): LABBLOO in the last 48 hours.  BMP:   Recent Labs   Lab 12/08/18  0347  12/08/18  1203   GLU 98   < > 142*   *   < > 145   K 5.0   < > 4.4   *   < > 107   CO2 24   < > 26   BUN 13   < > 12   CREATININE 0.8   < > 0.8   CALCIUM 8.7   < > 8.7   MG 1.5*  --   --     < > = values in this interval not displayed.     CBC:   Recent Labs   Lab 12/07/18  0456 12/08/18  0347   WBC 12.96* 9.95   HGB 8.4* 8.0*   HCT 26.8* 25.3*   * 339     CMP:   Recent Labs   Lab 12/08/18  0347 12/08/18  1123 12/08/18  1203   * 147* 145   K 5.0 4.5 4.4   * 109 107   CO2 24 26 26   GLU 98 98 142*   BUN 13 12 12   CREATININE 0.8 0.8 0.8   CALCIUM 8.7 8.9 8.7   ANIONGAP 11 12 12   EGFRNONAA >60.0 >60.0 >60.0     Coagulation:   No results for input(s): PT, INR, APTT in the last 48 hours.  Lactic Acid:   Recent Labs   Lab 12/06/18  1930 12/07/18  0006   LACTATE 1.2 1.2     Lipase:   No results for input(s): LIPASE in the last 48 hours.  Magnesium:   Recent Labs   Lab 12/07/18  0456 12/08/18  0347   MG 1.5* 1.5*     Urine Culture: No results for input(s): LABURIN in the last 48 hours.     Urine Studies:   Recent Labs   Lab 12/06/18  2154   COLORU Yellow   APPEARANCEUA Clear   PHUR 5.0   SPECGRAV 1.010   PROTEINUA 1+*   GLUCUA 1+*   KETONESU 1+*   BILIRUBINUA Negative   OCCULTUA 3+*   NITRITE Negative   LEUKOCYTESUR Trace*   RBCUA  71*   WBCUA 4   BACTERIA Occasional   SQUAMEPITHEL 1   HYALINECASTS 0       Significant Imaging: I have reviewed all pertinent imaging results/findings within the past 24 hours.

## 2018-12-08 NOTE — SUBJECTIVE & OBJECTIVE
Past Medical History:   Diagnosis Date    Atrial fibrillation     Dementia     GERD (gastroesophageal reflux disease)     HLD (hyperlipidemia)     Hypertension     Hypothyroid     Thyroid disease        Past Surgical History:   Procedure Laterality Date    ABDOMINAL SURGERY      EXPLORATORY LAPAROTOMY W/ BOWEL RESECTION      ILEOSTOMY      PARTIAL GASTRECTOMY      SPLENECTOMY, TOTAL         Review of patient's allergies indicates:   Allergen Reactions    Sulfa (sulfonamide antibiotics) Other (See Comments)     Severe reaction type unknown        Current Neurological Medications:  N/A     No current facility-administered medications on file prior to encounter.      Current Outpatient Medications on File Prior to Encounter   Medication Sig    aspirin 81 MG Chew Take 81 mg by mouth once daily.    atorvastatin (LIPITOR) 10 MG tablet Take 10 mg by mouth once daily.    bisoprolol (ZEBETA) 5 MG tablet Take 5 mg by mouth once daily.    donepezil (ARICEPT) 10 MG tablet Take 10 mg by mouth every evening.    levothyroxine (SYNTHROID) 25 MCG tablet Take 25 mcg by mouth once daily.    pantoprazole (PROTONIX) 40 MG tablet Take 40 mg by mouth once daily.    risperiDONE (RISPERDAL) 1 MG tablet Take 1 mg by mouth 2 (two) times daily.    rivastigmine (EXELON) 9.5 mg/24 hr PT24 Place 1 patch onto the skin once daily.     Family History     Family history is unknown by patient.        Tobacco Use    Smoking status: Never Smoker    Smokeless tobacco: Never Used   Substance and Sexual Activity    Alcohol use: No     Frequency: Never    Drug use: No    Sexual activity: Not Currently     Review of Systems   Unable to perform ROS: Mental status change   Constitutional: Positive for activity change, diaphoresis and fever.   HENT: Positive for trouble swallowing.    Gastrointestinal: Negative for nausea and vomiting.   Musculoskeletal:        Stiffness/rigidity    Neurological: Positive for tremors and speech  difficulty.     Objective:     Vital Signs (Most Recent):  Temp: 97.6 °F (36.4 °C) (12/08/18 1551)  Pulse: 107 (12/08/18 1551)  Resp: 18 (12/08/18 1551)  BP: 133/82 (12/08/18 1551)  SpO2: 96 % (12/08/18 1551) Vital Signs (24h Range):  Temp:  [97.6 °F (36.4 °C)-99.7 °F (37.6 °C)] 97.6 °F (36.4 °C)  Pulse:  [] 107  Resp:  [16-18] 18  SpO2:  [92 %-100 %] 96 %  BP: (119-138)/(69-90) 133/82     Weight: 55.3 kg (122 lb)  Body mass index is 21.61 kg/m².    Physical Exam  General:  Well-developed, well-nourished, nad, minimally responsive at first. Opens eyes on command. Verbal, limited  HEENT:  NCAT, PERRLA, EOMI, oropharyngeal membrane bloody, noted broken tooth, unclear the chronicity. Noted wound of the lower lip  Neck:  Supple, rigid, but able to move neck from side to side  Resp:  Symmetric expansion, no increased wob  CVS:  No LE edema, peripheral pulses 2+ (radial, dorsalis pedis)  GI:  Abd soft, non-distended, non-tender to palpation  Neurologic Exam:  Mental Status:  AAOx2.  Speech, scant, delayed, but able to answer few questions appropriately.  Cranial Nerves:   PERRLA, EOMI.  Facial movement, sensation intact and symmetric.  Palate raises symmetrically, tongue protrudes midline.    Motor:  Normal bulk and increased tone.  Moves all extremities on command, difficult to assess strength but she is antigravity throughout. Some contractures noted in b/l hands and feet  Sensory:  Intact to light touch at all extremities and face without inattention.    Reflexes:  Biceps, brachioradialis, patellar, Achilles 3+ and symmetric.  No ankle clonus, although hard to assess due to rigidity Coordination: Fine tremor notes on arm extension.  Gait:  Deferred          Significant Labs:   Hemoglobin A1c:   Recent Labs   Lab 12/06/18  1255   HGBA1C 5.6     Blood Culture: No results for input(s): LABBLOO in the last 48 hours.  BMP:   Recent Labs   Lab 12/07/18  0456  12/08/18  0347 12/08/18  1123 12/08/18  1203   *   <  > 98 98 142*   *   < > 147* 147* 145   K 3.4*   < > 5.0 4.5 4.4   *   < > 112* 109 107   CO2 24   < > 24 26 26   BUN 16   < > 13 12 12   CREATININE 1.0   < > 0.8 0.8 0.8   CALCIUM 8.9   < > 8.7 8.9 8.7   MG 1.5*  --  1.5*  --   --     < > = values in this interval not displayed.     CBC:   Recent Labs   Lab 12/07/18 0456 12/08/18  0347   WBC 12.96* 9.95   HGB 8.4* 8.0*   HCT 26.8* 25.3*   * 339     CMP:   Recent Labs   Lab 12/07/18  0456  12/08/18  0347 12/08/18  1123 12/08/18  1203   *   < > 98 98 142*   *   < > 147* 147* 145   K 3.4*   < > 5.0 4.5 4.4   *   < > 112* 109 107   CO2 24   < > 24 26 26   BUN 16   < > 13 12 12   CREATININE 1.0   < > 0.8 0.8 0.8   CALCIUM 8.9   < > 8.7 8.9 8.7   MG 1.5*  --  1.5*  --   --    ANIONGAP 16   < > 11 12 12   EGFRNONAA 58.8*   < > >60.0 >60.0 >60.0    < > = values in this interval not displayed.     CSF Culture: No results for input(s): CSFCULTURE in the last 48 hours.  CSF Studies: No results for input(s): ALIQUT, APPEARCSF, COLORCSF, CSFWBC, CSFRBC, GLUCCSF, LDHCSF, PROTEINCSF, VDRLCSF in the last 48 hours.  Inflammatory Markers: No results for input(s): SEDRATE, CRP, PROCAL in the last 48 hours.  POCT Glucose: No results for input(s): POCTGLUCOSE in the last 24 hours.  Prealbumin: No results for input(s): PREALBUMIN in the last 48 hours.  Respiratory Culture: No results for input(s): GSRESP, RESPIRATORYC in the last 48 hours.  Urine Culture: No results for input(s): LABURIN in the last 48 hours.  Urine Studies:   Recent Labs   Lab 12/06/18  2154   COLORU Yellow   APPEARANCEUA Clear   PHUR 5.0   SPECGRAV 1.010   PROTEINUA 1+*   GLUCUA 1+*   KETONESU 1+*   BILIRUBINUA Negative   OCCULTUA 3+*   NITRITE Negative   LEUKOCYTESUR Trace*   RBCUA 71*   WBCUA 4   BACTERIA Occasional   SQUAMEPITHEL 1   HYALINECASTS 0     All pertinent lab results from the past 24 hours have been reviewed.    Significant Imaging: I have reviewed all pertinent  imaging results/findings within the past 24 hours.   MRI  PENDING     EEG  Pending final read; no evidence of NCSE or other epileptiform activity/seziures upon independent review

## 2018-12-08 NOTE — PROGRESS NOTES
"[facility] Hospital Medicine  Progress Note    Patient Name: Silvia Ornelas  MRN: [id number]  Patient Class: IP- Inpatient   Admission Date: 12/6/2018  Length of Stay: 2 days  Attending Physician: Humberto Kingston MD  Primary Care Provider: Primary Doctor [facility] Medicine Team: [facility] HOSP MED 5 Kevlin Mcadams MD    Subjective:     Principal Problem:Fever    HPI:  This is a 68 year old female transferred from [facility] for fever, requesting ID and general surgery consultations. She has a PMH of severe dementia (baseline status unknown), Afib (rate controlled, not on anticoagulation), HTN, HLD, GERD, and hypothyroidism. 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     History obtained form [facility] paper chart review, secondary to patient's mental status.     She was hospitalized at [facility] on 11/10/18 for suspected aspiration pneumonia; status post treatment with Vanc/Zosyn. She was noted to have abnormal barium swallow at that time; PEG tube placement again discussed with family; they declined. Discharged back to nursing home on 11/16.     She presented again on 11/18/18 for new onset of nausea and vomiting. On presentation, labs significant for BRODY (BUN 24, Cr 2.6). CT A/P showed high-grade small bowel obstruction and bilateral lower lobe consolidations. She was started on Linezolid and Cefepime for suspected bilateral aspiration pneumonia. Her SBO was managed by general surgery with conservative " measures, including NG decompression, pain medications, and IVF.  She then began developing leukocytosis to 18, with new fevers (Tmax 101) on 11/23; 5 day course of Gentamicin was added on 11/22. On 11/26, she was noted to be passing gas and having bowel movements, but continued to have high NG bilious outptut. Repeat CT showed resolving SBO, but continued bilateral lower lobe consolidations. On 11/27, she spiked a fever to 107 (rectal probe) and developed new onset of extremity rigidity, hyperreflexia, and fine tremor, associated with worsening leukocytosis to 28, and lactic of 3. She was assessed to have serotonin syndrome secondary vs NMS due to possible combination of Linezolid, Dilaudid, Fentanyl, and Zofran. She received 140 mg of Dantrolene for possible NMS with symptom improvement; Linezolid discontinued. Patient's antibiotics switched to vancomycin and cefepime for aspiration pneumonitis, WBC was downtrending, and both antibiotics stopped on 12/3 after completed of 6-7 day antibiotic course.  Patient noted to be alert, conversant and oriented to self and with WBC downtrending, antibiotics stopped. Step down from ICU.    Overnight on 12/4, she had resumption of fevers (Tmax 101-102) with again features again of rigidity. Mental status change declined again with minimal  verbal response; patient noted to be ill appearing and diaphoretic.  Medications reviewed with pharmacy to ensure no active medications might be precipitating Serotonin Syndrome. CT head was unremarkable. Blood and urine cultures repeated. Hoover catheter exchanged. LP performed; gram stain negative, but other analyses pending. In discussion of case, decision made to watch patient overnight and re-eval on 12/5. On 12/5, her Vancomycin and Cefepime restarted, also on Diflucan for urine culture positive for Candida.     Additionally, she was noted to have severe oropharyngeal dysphagia. She failed 2 Modified barium swallows, failed again on  12/5; OSH recommendations were for non-oral feeding. She initially had J-tube after last abdominal surgery, but fell out without attempt on repair. GI tried to place a PEG 1 year ago; but was unable.  IR would not want to place J-tube. General surgery at [facility] declined to operate secondary to complex anatomy.  NG tube was in for bulk of hospitalization for decompression and then tube feedings, but removed for concern of nasopharyngeal erosions.      Hospital Course:  12/6: Admission labs notable for leukocytosis of 17, lactic of 2.4, sodium of 154. Restarted on Vanc/Zosyn. ICU consulted for hypernatremia management; recommended D5W correction based on free water deficit. Started on 14 hours of D5W at 70 mL/hr.   12/7: Overnight, patient noted to be tachycardic to 120, with temperature spike to 101.4. Unable to give Tylenol because of NPO/aspiratioin precautions; defervescence without intervention. Leukocytosis down to 13, lactic down to 1.2. Sodium improved to 151; restarted on D5W 70 mL for 10 hours. Consulted general surgery and ID for further recommendations. Per ID, discontinued all antibiotics. Ordered HIV, RPR, EEG, JÚNIOR, and RF. Ordered CT A/P: no abscess, expected post-operative anatomy changes, but showing RLL consolidation possibly aspiration pneumonia. Patient remains awake, alert, but with disordered and delayed response to questioning.   12/8: No events overnight. Afebrile. Tachycardia improved to lower 100's. Leukocytosis resolved (WBC 10). Sodium normalized to 145. NG restarted with tube feeds and some home medications. Pending surgery decision on possible PEG after they review CT findings. Consulted neurology because of concern of mental status changes reported by family over hospital course. Ordered MRI with/without contrast. Considering NMS as possible explanation for fevers, tachycardia, mental status, and contractures on exam.   12/9:     Interval History: Patient seen at bedside this morning;  remains awake, alert, but with disordered and delayed response to questioning, unchanged from yesterday.     Review of Systems   Unable to perform ROS: Dementia     Objective:     Vital Signs (Most Recent):  Temp: 98.4 °F (36.9 °C) (12/08/18 1138)  Pulse: 99 (12/08/18 1500)  Resp: 17 (12/08/18 1138)  BP: 129/69 (12/08/18 1138)  SpO2: 95 % (12/08/18 1138) Vital Signs (24h Range):  Temp:  [97.5 °F (36.4 °C)-99.7 °F (37.6 °C)] 98.4 °F (36.9 °C)  Pulse:  [] 99  Resp:  [16-18] 17  SpO2:  [92 %-100 %] 95 %  BP: (119-138)/(69-90) 129/69     Weight: 55.3 kg (122 lb)  Body mass index is 21.61 kg/m².    Intake/Output Summary (Last 24 hours) at 12/8/2018 1519  Last data filed at 12/8/2018 0700  Gross per 24 hour   Intake 0 ml   Output 600 ml   Net -600 ml      Physical Exam   Constitutional: She is oriented to person, place, and time. She appears well-developed and well-nourished. No distress.   HENT:   Head: Normocephalic and atraumatic.   Mouth/Throat: Uvula is midline, oropharynx is clear and moist and mucous membranes are normal. Abnormal dentition. No dental abscesses. No tonsillar exudate.   Eyes: Conjunctivae are normal. Pupils are equal, round, and reactive to light. No scleral icterus.   Neck: Neck supple. No JVD present.   Cardiovascular: Regular rhythm, normal heart sounds and normal pulses. Tachycardia present.   No murmur heard.  Pulmonary/Chest: Effort normal and breath sounds normal. No respiratory distress. She has no decreased breath sounds. She has no rales.   Abdominal: Soft. Normal appearance and bowel sounds are normal. She exhibits no distension. There is no tenderness.   Colostomy bag in RLQ; multiple well-healed previous abdominal surgery scars.    Genitourinary:   Genitourinary Comments: Hoover inserted.    Musculoskeletal:   There is atrophy of the muscles of the bilateral lower extremities distal to the knee.    Neurological: She is alert and oriented to person, place, and time. She displays no  tremor. No cranial nerve deficit. GCS eye subscore is 4. GCS verbal subscore is 3. GCS motor subscore is 6. She displays no Babinski's sign on the right side. She displays no Babinski's sign on the left side.   There is contracture of the bilateral feel and upper extremities at the elbow with rigidity of movement at those joints.    Skin: Skin is warm and dry. No bruising and no rash noted. She is not diaphoretic.   Skin is flushed around the lower face and anterior neck.      Significant Labs:   Blood Culture:   No results for input(s): LABBLOO in the last 48 hours.  BMP:   Recent Labs   Lab 12/08/18  0347  12/08/18  1203   GLU 98   < > 142*   *   < > 145   K 5.0   < > 4.4   *   < > 107   CO2 24   < > 26   BUN 13   < > 12   CREATININE 0.8   < > 0.8   CALCIUM 8.7   < > 8.7   MG 1.5*  --   --     < > = values in this interval not displayed.     CBC:   Recent Labs   Lab 12/07/18  0456 12/08/18  0347   WBC 12.96* 9.95   HGB 8.4* 8.0*   HCT 26.8* 25.3*   * 339     CMP:   Recent Labs   Lab 12/08/18  0347 12/08/18  1123 12/08/18  1203   * 147* 145   K 5.0 4.5 4.4   * 109 107   CO2 24 26 26   GLU 98 98 142*   BUN 13 12 12   CREATININE 0.8 0.8 0.8   CALCIUM 8.7 8.9 8.7   ANIONGAP 11 12 12   EGFRNONAA >60.0 >60.0 >60.0     Coagulation:   No results for input(s): PT, INR, APTT in the last 48 hours.  Lactic Acid:   Recent Labs   Lab 12/06/18  1930 12/07/18  0006   LACTATE 1.2 1.2     Lipase:   No results for input(s): LIPASE in the last 48 hours.  Magnesium:   Recent Labs   Lab 12/07/18  0456 12/08/18  0347   MG 1.5* 1.5*     Urine Culture: No results for input(s): LABURIN in the last 48 hours.     Urine Studies:   Recent Labs   Lab 12/06/18  2154   COLORU Yellow   APPEARANCEUA Clear   PHUR 5.0   SPECGRAV 1.010   PROTEINUA 1+*   GLUCUA 1+*   KETONESU 1+*   BILIRUBINUA Negative   OCCULTUA 3+*   NITRITE Negative   LEUKOCYTESUR Trace*   RBCUA 71*   WBCUA 4   BACTERIA Occasional   SQUAMEPITHEL 1    HYALINECASTS 0       Significant Imaging: I have reviewed all pertinent imaging results/findings within the past 24 hours.    Assessment/Plan:      * Fever    · Differentials here include recurrent aspiration pneumonia secondary to dysphagia, bacteremia, and UTI.  · Admitted at OSH on 11/18 for high-grade SBO with recurrent bilateral pneumonia on CT scan on 11/26 at OSH.   · Hospital course at OSH complicated by symptoms of serotonin syndrome vs NMS with fever up to 107, status post Dantrolene, and contributed to Linezolid usage partly.   · Status post multiple courses of broad spectrum coverage including Linezolid, Cefepime, and Gentamicin; restarted on 12/4 with Vancomycin, Cefepime, and Diflucan after re-developing fevers. Urine culture at OSH positive for Candida.   · PICC line placed at OSH; unclear when inserted; will attempt to contact OSH as not clear in transfer paperwork.   · Admission labs notable for leukocytosis of 17 with 80% granulocytes, and lactic of 2.9; restarted Vanc/Zosyn on 12/6.  · Afebrile overnight. Last fever 101.4 on 12/6  · Leukocytosis resolved this morning. Lactate 1.2 as of 12/7.   · CXR here unremarkable.     · Blood cultures ordered. UA unremarkable for infection.    · CT A/P (12/7) without evidence of abdominal infection, showed expected post-operative changes, but showed possible RLL consolidation concerning for possible aspiration pneumonia vs pneumonitis.    · ID consulted given persistent fevers without source status post multiple broad spectrum antibiotics; recommending holding all antibiotics after 12/7 and re-culturing if patient spikes fever again. Based on CT findings, have low threshold for starting Unasyn if patient decompensates.   · Consulted neurology for mental status changes; considering NMS vs serotonin syndrome as possible explanation for fever, vital signs, and mental status changes.          Hypernatremia    · Sodium was only 145 on admission on 11/18.    · Noted to have progressively increasing Na at OSH up to 153 prior to discharge. Could be explanation for patient's acute mental status change.   · Repeat here 154.   · Evaluated by nephrology there; treated on and off with D5NS and free water, but without consistent correction.   · Will consult ICU for hypernatremia correction given likely need for closely followed sodium levels vs mental status changes.        Dysphagia    · Noted to have severe oropharyngeal dysphagia.   · Failed 2 Modified barium swallows, failed again on 12/5; OSH recommendations were for non-oral feeding. Most recent barium swallow assessed as silent aspirator.   · She initially had J-tube after last abdominal surgery, but fell out without attempt on repair.   · GI tried to place a PEG 1 year ago; but was unable.   · IR would not want to place J-tube.   · General surgery at [facility] declined to operate secondary to complex anatomy.   · NG tube was in for bulk of hospitalization for decompression and then tube feedings, but removed for concern of nasopharyngeal erosions.   · Speech evaluated here; recommended strict NPO with aspiration precautions.   · General surgery evaluated patient afternoon of 12/7; will review CT scan findings and decide on possible PEG.   · Consulted dietary/nutrition: Tube feed recommendations placed for initiation.  · Will place NG for tube feeds and restarting home medications.         Hypothyroidism    · On Synthroid at home.   · Holding with aspiration precautions.        Atrial fibrillation    · Rate controlled with Bisoprolol at home per outside records.   · CHADVASC score of 3; on no anticoagulation.   · Will resume home Bisoprolol after NG placement today.            Dementia without behavioral disturbance    · Baseline status unknown. Upper level spoke with daughter Terri (phone number on sticky note). She states that since 2016 her mother's mental status has been declining she used to be able to do all her  ADLs until she started having her abdominal surgeries due to a volvulus.  She was placed in a nursing home in 2016. In the last 4 months she has been less verbal and presented to the OSH for a chocking episode which lead to a cardiac arrest.  · Patient was noted to be awake, alert, and conversing throughout hospital stay at OSH after resolution of possible serotonin syndrome in ICU.   · Noted to have mental status change associated with minimal verbal response after redevelopment of fevers. CT head and LP at OSH unremarkable.   · Noted to be awake and alert, but with disordered response to questions on admission, no change today.   · Cannot resume home dementia medications with aspiration precautions.   · Neurology consulted on 12/8 for mental status changes/worsening dementia; per recommendations, ordered MRI brain with/without contrast, and started EEG.          GERD (gastroesophageal reflux disease)    · Holding home PPI with aspiration precautions.        HLD (hyperlipidemia)    · No prior lipid panel in system.   · Holding home Atorvastatin with aspiration precautions.         Essential hypertension    · Controlled.   · Holding home BP medications with NPO due to aspiration precautions.   · Vitals ordered Q4H.          VTE Risk Mitigation (From admission, onward)        Ordered     heparin (porcine) injection 5,000 Units  Every 12 hours      12/07/18 1400     Place sequential compression device  Until discontinued      12/06/18 1138     IP VTE LOW RISK PATIENT  Once      12/06/18 1138              Kelvin Mcadams MD  Department of Hospital Medicine   [facility]

## 2018-12-08 NOTE — ASSESSMENT & PLAN NOTE
· Rate controlled with Bisoprolol at home per outside records.   · CHADVASC score of 3; on no anticoagulation.   · Will resume home Bisoprolol after NG placement today.

## 2018-12-08 NOTE — HPI
Patient is a 68 year old female w/ past medical history significant for PMH of severe dementia (baseline status unknown), Afib (rate controlled, not on anticoagulation), HTN, HLD, GERD, and hypothyroidism who was transferred from [facility] for FUO, requesting ID and general surgery consultations, on 12/06.    History obtained form OSH paper chart review, secondary to patient's mental status.    Patient initially hospitalized at [facility] on 11/10/18 for suspected aspiration pneumonia; status post treatment with Vanc/Zosyn. She was noted to have abnormal barium swallow at that time; PEG tube placement again discussed with family; they declined. Discharged back to nursing home on 11/16.    She presented again on 11/18/18 for new onset of nausea and vomiting. She was started on Linezolid and Cefepime for suspected bilateral aspiration pneumonia. She then began developing leukocytosis to 18, with new fevers (Tmax 101) on 11/23; 5 day course of Gentamicin was added on 11/22. On 11/27, she spiked a fever to 107 (rectal probe) and developed new onset of extremity rigidity, hyperreflexia, and fine tremor, associated with worsening leukocytosis to 28, and lactic of 3. She was assessed to have serotonin syndrome secondary vs NMS due to possible combination of Linezolid, Dilaudid, Fentanyl, and Zofran. She received 140 mg of Dantrolene for possible NMS with symptom improvement; Linezolid discontinued, and ABx switched and stopped on 12/3 after completed of 6-7 day antibiotic course.  Patient noted to be alert, conversant and oriented to self and with WBC downtrending, antibiotics stopped. Step down from ICU.  On 12/4, she had resumption of fevers (Tmax 101-102) with again features again of rigidity. Mental status change declined again with minimal  verbal response; patient noted to be ill appearing and diaphoretic.  Medications reviewed with pharmacy at that time again for any possible correlation w/ Serotonin  Syndrome.   CT head was unremarkable. Blood and urine cultures repeated. Hoover catheter exchanged.  LP performed; gram stain negative,QBC 1. Protein <40.  On 12/5, her Vancomycin and Cefepime restarted, also on Diflucan for urine culture positive for Candida.   Patient [place] [facility] [place]; noted to be non-verbal by the primary team w/ persistent rigidity, hyperreflexia and AMS.   Neurology consulted for assistance with this case.

## 2018-12-08 NOTE — PLAN OF CARE
Problem: Patient Care Overview  Goal: Plan of Care Review  Outcome: Ongoing (interventions implemented as appropriate)  Patient nonverbal, unable to assess orientation, follows command. IV K administered  for Potassium replacement during shift. CT abd completed during shift. Weight shift assistance as needed. Pt still NPO. Oral care performed frequently.  No falls and injuries overnight. Pt stable, will continue to monitor.     Problem: Pressure Ulcer Risk (Norm Scale) (Adult,Obstetrics,Pediatric)  Intervention: Maintain Head of Bed Elevation Less Than 30 Degrees as Tolerated   12/08/18 0512   Positioning   Head of Bed (HOB) HOB at 20-30 degrees       Goal: Skin Integrity  Patient will demonstrate the desired outcomes by discharge/transition of care.  Outcome: Ongoing (interventions implemented as appropriate)   12/08/18 0512   Pressure Ulcer Risk (Norm Scale) (Adult,Obstetrics,Pediatric)   Skin Integrity making progress toward outcome

## 2018-12-08 NOTE — ASSESSMENT & PLAN NOTE
· Noted to have severe oropharyngeal dysphagia.   · Failed 2 Modified barium swallows, failed again on 12/5; OSH recommendations were for non-oral feeding. Most recent barium swallow assessed as silent aspirator.   · She initially had J-tube after last abdominal surgery, but fell out without attempt on repair.   · GI tried to place a PEG 1 year ago; but was unable.   · IR would not want to place J-tube.   · General surgery at [facility] declined to operate secondary to complex anatomy.   · NG tube was in for bulk of hospitalization for decompression and then tube feedings, but removed for concern of nasopharyngeal erosions.   · Speech evaluated here; recommended strict NPO with aspiration precautions.   · General surgery evaluated patient afternoon of 12/7; will review CT scan findings and decide on possible PEG.   · Consulted dietary/nutrition: Tube feed recommendations placed for initiation.  · Will place NG for tube feeds and restarting home medications.

## 2018-12-09 LAB
ANION GAP SERPL CALC-SCNC: 15 MMOL/L
BASOPHILS # BLD AUTO: 0.05 K/UL
BASOPHILS NFR BLD: 0.4 %
BUN SERPL-MCNC: 12 MG/DL
CALCIUM SERPL-MCNC: 8.6 MG/DL
CHLORIDE SERPL-SCNC: 104 MMOL/L
CO2 SERPL-SCNC: 24 MMOL/L
CREAT SERPL-MCNC: 0.8 MG/DL
DIFFERENTIAL METHOD: ABNORMAL
EOSINOPHIL # BLD AUTO: 0.2 K/UL
EOSINOPHIL NFR BLD: 1.3 %
ERYTHROCYTE [DISTWIDTH] IN BLOOD BY AUTOMATED COUNT: 16.8 %
EST. GFR  (AFRICAN AMERICAN): >60 ML/MIN/1.73 M^2
EST. GFR  (NON AFRICAN AMERICAN): >60 ML/MIN/1.73 M^2
GLUCOSE SERPL-MCNC: 103 MG/DL
HCT VFR BLD AUTO: 26.9 %
HGB BLD-MCNC: 8.3 G/DL
IMM GRANULOCYTES # BLD AUTO: 0.07 K/UL
IMM GRANULOCYTES NFR BLD AUTO: 0.6 %
LYMPHOCYTES # BLD AUTO: 1.8 K/UL
LYMPHOCYTES NFR BLD: 16.4 %
MAGNESIUM SERPL-MCNC: 2 MG/DL
MCH RBC QN AUTO: 29.4 PG
MCHC RBC AUTO-ENTMCNC: 30.9 G/DL
MCV RBC AUTO: 95 FL
MONOCYTES # BLD AUTO: 1 K/UL
MONOCYTES NFR BLD: 9.1 %
NEUTROPHILS # BLD AUTO: 8.1 K/UL
NEUTROPHILS NFR BLD: 72.2 %
NRBC BLD-RTO: 1 /100 WBC
PHOSPHATE SERPL-MCNC: 3.4 MG/DL
PLATELET # BLD AUTO: 378 K/UL
PMV BLD AUTO: 12.1 FL
POTASSIUM SERPL-SCNC: 4.8 MMOL/L
RBC # BLD AUTO: 2.82 M/UL
SODIUM SERPL-SCNC: 143 MMOL/L
WBC # BLD AUTO: 11.22 K/UL

## 2018-12-09 PROCEDURE — 25500020 PHARM REV CODE 255: Performed by: STUDENT IN AN ORGANIZED HEALTH CARE EDUCATION/TRAINING PROGRAM

## 2018-12-09 PROCEDURE — 63600175 PHARM REV CODE 636 W HCPCS: Performed by: STUDENT IN AN ORGANIZED HEALTH CARE EDUCATION/TRAINING PROGRAM

## 2018-12-09 PROCEDURE — 36415 COLL VENOUS BLD VENIPUNCTURE: CPT

## 2018-12-09 PROCEDURE — 83735 ASSAY OF MAGNESIUM: CPT

## 2018-12-09 PROCEDURE — 25000003 PHARM REV CODE 250: Performed by: STUDENT IN AN ORGANIZED HEALTH CARE EDUCATION/TRAINING PROGRAM

## 2018-12-09 PROCEDURE — 99232 SBSQ HOSP IP/OBS MODERATE 35: CPT | Mod: GC,,, | Performed by: PSYCHIATRY & NEUROLOGY

## 2018-12-09 PROCEDURE — 84100 ASSAY OF PHOSPHORUS: CPT

## 2018-12-09 PROCEDURE — A9585 GADOBUTROL INJECTION: HCPCS | Performed by: STUDENT IN AN ORGANIZED HEALTH CARE EDUCATION/TRAINING PROGRAM

## 2018-12-09 PROCEDURE — 85025 COMPLETE CBC W/AUTO DIFF WBC: CPT

## 2018-12-09 PROCEDURE — 99233 SBSQ HOSP IP/OBS HIGH 50: CPT | Mod: GC,,, | Performed by: STUDENT IN AN ORGANIZED HEALTH CARE EDUCATION/TRAINING PROGRAM

## 2018-12-09 PROCEDURE — 99232 PR SUBSEQUENT HOSPITAL CARE,LEVL II: ICD-10-PCS | Mod: GC,,, | Performed by: PSYCHIATRY & NEUROLOGY

## 2018-12-09 PROCEDURE — 80048 BASIC METABOLIC PNL TOTAL CA: CPT

## 2018-12-09 PROCEDURE — 99233 PR SUBSEQUENT HOSPITAL CARE,LEVL III: ICD-10-PCS | Mod: GC,,, | Performed by: STUDENT IN AN ORGANIZED HEALTH CARE EDUCATION/TRAINING PROGRAM

## 2018-12-09 PROCEDURE — 20600001 HC STEP DOWN PRIVATE ROOM

## 2018-12-09 RX ORDER — CYPROHEPTADINE HYDROCHLORIDE 4 MG/1
4 TABLET ORAL 3 TIMES DAILY
Status: DISCONTINUED | OUTPATIENT
Start: 2018-12-09 | End: 2018-12-09

## 2018-12-09 RX ORDER — SODIUM,POTASSIUM PHOSPHATES 280-250MG
2 POWDER IN PACKET (EA) ORAL EVERY 4 HOURS
Status: COMPLETED | OUTPATIENT
Start: 2018-12-09 | End: 2018-12-09

## 2018-12-09 RX ORDER — MAGNESIUM SULFATE HEPTAHYDRATE 40 MG/ML
2 INJECTION, SOLUTION INTRAVENOUS ONCE
Status: COMPLETED | OUTPATIENT
Start: 2018-12-09 | End: 2018-12-09

## 2018-12-09 RX ORDER — CYPROHEPTADINE HYDROCHLORIDE 4 MG/1
4 TABLET ORAL 3 TIMES DAILY
Status: COMPLETED | OUTPATIENT
Start: 2018-12-09 | End: 2018-12-10

## 2018-12-09 RX ORDER — GADOBUTROL 604.72 MG/ML
6 INJECTION INTRAVENOUS
Status: COMPLETED | OUTPATIENT
Start: 2018-12-09 | End: 2018-12-09

## 2018-12-09 RX ADMIN — CYPROHEPTADINE HYDROCHLORIDE 4 MG: 4 TABLET ORAL at 09:12

## 2018-12-09 RX ADMIN — HEPARIN SODIUM 5000 UNITS: 5000 INJECTION, SOLUTION INTRAVENOUS; SUBCUTANEOUS at 08:12

## 2018-12-09 RX ADMIN — MAGNESIUM SULFATE IN WATER 2 G: 40 INJECTION, SOLUTION INTRAVENOUS at 08:12

## 2018-12-09 RX ADMIN — ASPIRIN 81 MG CHEWABLE TABLET 81 MG: 81 TABLET CHEWABLE at 08:12

## 2018-12-09 RX ADMIN — CYPROHEPTADINE HYDROCHLORIDE 4 MG: 4 TABLET ORAL at 03:12

## 2018-12-09 RX ADMIN — GADOBUTROL 6 ML: 604.72 INJECTION INTRAVENOUS at 05:12

## 2018-12-09 RX ADMIN — POTASSIUM & SODIUM PHOSPHATES POWDER PACK 280-160-250 MG 2 PACKET: 280-160-250 PACK at 11:12

## 2018-12-09 RX ADMIN — POTASSIUM & SODIUM PHOSPHATES POWDER PACK 280-160-250 MG 2 PACKET: 280-160-250 PACK at 08:12

## 2018-12-09 RX ADMIN — BISOPROLOL FUMARATE 2.5 MG: 5 TABLET ORAL at 08:12

## 2018-12-09 RX ADMIN — ACETAMINOPHEN 650 MG: 650 SOLUTION ORAL at 11:12

## 2018-12-09 NOTE — ASSESSMENT & PLAN NOTE
· Sodium was only 145 on admission on 11/18.   · Noted to have progressively increasing Na at OSH up to 153 prior to discharge. Could be explanation for patient's acute mental status change.   · Repeat here 154.   · Evaluated by nephrology there; treated on and off with D5NS and free water, but without consistent correction.   · Resolved

## 2018-12-09 NOTE — ASSESSMENT & PLAN NOTE
Patient is a 69 yo female w/ past complicated medical history presents as a transfer from OSH w/ primary diagnosis of fever w/ culture negative workup ,rigidity, AMS w/ decreased responsiveness, hyperreflexia. Per records, patient was given Linezolid, Zofran and Fentanyl at one point of time at the OSH and had developed rapid onset above symptoms. From the notes, patient was presumed to have NMS vs SS and the offending agents were stopped as well as patient was give Dantrolene 140 mg - patient did show improvement in her muscle rigidity, which is expected.   She later developed similar symptoms on 12/04, and although her MAR was evaluated for any other offending agents, none were found. She was transferred here on 12/06 for further work- up of the above symptoms.   Infectious work-up has been unremarkable.     Patient has shown some improvement today, less rigid. Communicated to the primary team regarding Cyproheptadine recommendations as well as close monitoring of BP/avoiding for hypotension.     - Patient remains hyperreflexic, rigid w/ increased tone throughout - IMPROVED TODAY  - Currently concerned for SS, as the above mentioned agents ( Zofran, Linezolid and Fentanyl) have all been associated with the development of this syndrome. Dantrolene would show improvement in patient's rigidity but would not be an appropriate treatment for SS.  - CPK ordered - WNL now; but patient has been receiving IVF etc  - No mention of any neuroleptic medication on patient's OSH records  - Could consider cyproheptadine as the treatment modality for SS; monitor for hypotension   - EEG ordered - upon review with staff no evidence of NCSE or diffuse cortical dysfunction; final read pending  - MRI w/o any abnormalities   - Will continue to follow the patient; appreciate consult, please contact w/ any additional questions at this time

## 2018-12-09 NOTE — ASSESSMENT & PLAN NOTE
· Baseline status unknown. Upper level spoke with daughter Terri (phone number on sticky note). She states that since 2016 her mother's mental status has been declining she used to be able to do all her ADLs until she started having her abdominal surgeries due to a volvulus.  She was placed in a nursing home in 2016. In the last 4 months she has been less verbal and presented to the OSH for a chocking episode which lead to a cardiac arrest.  · Patient was noted to be awake, alert, and conversing throughout hospital stay at OSH after resolution of possible serotonin syndrome in ICU.   · Noted to have mental status change associated with minimal verbal response after redevelopment of fevers. CT head and LP at OSH unremarkable.   · Noted to be awake and alert, but with disordered response to questions on admission, no change today.   · Cannot resume home dementia medications with aspiration precautions.   · Neurology consulted on 12/8 for mental status changes/worsening dementia; per recommendations, ordered MRI brain with/without contrast which did not show any intracranial abnormalities   · Discussed with neurology possibility of serotonin syndrome residual , decided to start on cryptoheptadine 4 mg TID for today and watch

## 2018-12-09 NOTE — ASSESSMENT & PLAN NOTE
· Rate controlled with Bisoprolol at home per outside records.   · CHADVASC score of 3; on no anticoagulation.   · Will resume home Bisoprolol

## 2018-12-09 NOTE — PROGRESS NOTES
"[facility] Hospital Medicine  Progress Note    Patient Name: Silvia Ornelas  MRN: [id number]  Patient Class: IP- Inpatient   Admission Date: 12/6/2018  Length of Stay: 3 days  Attending Physician: Humberto Kingston MD  Primary Care Provider: Primary Doctor [facility] Medicine Team: [facility] 5 Jeaneth Boyd MD    Subjective:     Principal Problem:Fever    HPI:  This is a 68 year old female transferred from [facility] for fever, requesting ID and general surgery consultations. She has a PMH of severe dementia (baseline status unknown), Afib (rate controlled, not on anticoagulation), HTN, HLD, GERD, and hypothyroidism. 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     History obtained form [facility] paper chart review, secondary to patient's mental status.     She was hospitalized at [facility] on 11/10/18 for suspected aspiration pneumonia; status post treatment with Vanc/Zosyn. She was noted to have abnormal barium swallow at that time; PEG tube placement again discussed with family; they declined. Discharged back to nursing home on 11/16.     She presented again on 11/18/18 for new onset of nausea and vomiting. On presentation, labs significant for BRODY (BUN 24, Cr 2.6). CT A/P showed high-grade small bowel obstruction and bilateral lower lobe consolidations. She was started on Linezolid and Cefepime for suspected bilateral aspiration pneumonia. Her SBO was managed by general surgery with conservative measures, " including NG decompression, pain medications, and IVF.  She then began developing leukocytosis to 18, with new fevers (Tmax 101) on 11/23; 5 day course of Gentamicin was added on 11/22. On 11/26, she was noted to be passing gas and having bowel movements, but continued to have high NG bilious outptut. Repeat CT showed resolving SBO, but continued bilateral lower lobe consolidations. On 11/27, she spiked a fever to 107 (rectal probe) and developed new onset of extremity rigidity, hyperreflexia, and fine tremor, associated with worsening leukocytosis to 28, and lactic of 3. She was assessed to have serotonin syndrome secondary vs NMS due to possible combination of Linezolid, Dilaudid, Fentanyl, and Zofran. She received 140 mg of Dantrolene for possible NMS with symptom improvement; Linezolid discontinued. Patient's antibiotics switched to vancomycin and cefepime for aspiration pneumonitis, WBC was downtrending, and both antibiotics stopped on 12/3 after completed of 6-7 day antibiotic course.  Patient noted to be alert, conversant and oriented to self and with WBC downtrending, antibiotics stopped. Step down from ICU.    Overnight on 12/4, she had resumption of fevers (Tmax 101-102) with again features again of rigidity. Mental status change declined again with minimal  verbal response; patient noted to be ill appearing and diaphoretic.  Medications reviewed with pharmacy to ensure no active medications might be precipitating Serotonin Syndrome. CT head was unremarkable. Blood and urine cultures repeated. Hoover catheter exchanged. LP performed; gram stain negative, but other analyses pending. In discussion of case, decision made to watch patient overnight and re-eval on 12/5. On 12/5, her Vancomycin and Cefepime restarted, also on Diflucan for urine culture positive for Candida.     Additionally, she was noted to have severe oropharyngeal dysphagia. She failed 2 Modified barium swallows, failed again on 12/5; OSH  recommendations were for non-oral feeding. She initially had J-tube after last abdominal surgery, but fell out without attempt on repair. GI tried to place a PEG 1 year ago; but was unable.  IR would not want to place J-tube. General surgery at [facility] declined to operate secondary to complex anatomy.  NG tube was in for bulk of hospitalization for decompression and then tube feedings, but removed for concern of nasopharyngeal erosions.      Hospital Course:  12/6: Admission labs notable for leukocytosis of 17, lactic of 2.4, sodium of 154. Restarted on Vanc/Zosyn. ICU consulted for hypernatremia management; recommended D5W correction based on free water deficit. Started on 14 hours of D5W at 70 mL/hr.   12/7: Overnight, patient noted to be tachycardic to 120, with temperature spike to 101.4. Unable to give Tylenol because of NPO/aspiratioin precautions; defervescence without intervention. Leukocytosis down to 13, lactic down to 1.2. Sodium improved to 151; restarted on D5W 70 mL for 10 hours. Consulted general surgery and ID for further recommendations. Per ID, discontinued all antibiotics. Ordered HIV, RPR, EEG, JÚNIOR, and RF. Ordered CT A/P: no abscess, expected post-operative anatomy changes, but showing RLL consolidation possibly aspiration pneumonia. Patient remains awake, alert, but with disordered and delayed response to questioning.   12/8: No events overnight. Afebrile. Tachycardia improved to lower 100's. Leukocytosis resolved (WBC 10). Sodium normalized to 145. NG restarted with tube feeds and some home medications. Pending surgery decision on possible PEG after they review CT findings. Consulted neurology because of concern of mental status changes reported by family over hospital course. Ordered MRI with/without contrast. Considering NMS as possible explanation for fevers, tachycardia, mental status, and contractures on exam.   12/9: Continues with contractures on exam , no fevers , was able to blink  her eyes for yes responses     Interval History: Started on tube feeds, MRI negative for intracranial pathology, remains afebrile. Daughter updated on the phone.   Review of Systems   Unable to perform ROS: Patient nonverbal     Objective:     Vital Signs (Most Recent):  Temp: 98.5 °F (36.9 °C) (12/09/18 1139)  Pulse: 105 (12/09/18 1139)  Resp: 18 (12/09/18 1139)  BP: (!) 99/57 (12/09/18 1139)  SpO2: (!) 93 % (12/09/18 1139) Vital Signs (24h Range):  Temp:  [97.5 °F (36.4 °C)-98.7 °F (37.1 °C)] 98.5 °F (36.9 °C)  Pulse:  [] 105  Resp:  [18-20] 18  SpO2:  [93 %-99 %] 93 %  BP: ()/(57-82) 99/57     Weight: 55.3 kg (122 lb)  Body mass index is 21.61 kg/m².    Intake/Output Summary (Last 24 hours) at 12/9/2018 1256  Last data filed at 12/9/2018 0100  Gross per 24 hour   Intake 30 ml   Output 1150 ml   Net -1120 ml      Physical Exam   Constitutional: She is oriented to person, place, and time. She appears well-developed and well-nourished. No distress.   HENT:   Head: Normocephalic and atraumatic.   Mouth/Throat: Uvula is midline, oropharynx is clear and moist and mucous membranes are normal. Abnormal dentition. No dental abscesses. No tonsillar exudate.   Eyes: Conjunctivae are normal. Pupils are equal, round, and reactive to light. No scleral icterus.   Neck: Neck supple. No JVD present.   Cardiovascular: Regular rhythm, normal heart sounds and normal pulses. Tachycardia present.   No murmur heard.  Pulmonary/Chest: Effort normal and breath sounds normal. No respiratory distress. She has no decreased breath sounds. She has no rales.   Abdominal: Soft. Normal appearance and bowel sounds are normal. She exhibits no distension. There is no tenderness.   Colostomy bag in RLQ; multiple well-healed previous abdominal surgery scars.    Genitourinary:   Genitourinary Comments: Hoover inserted.    Musculoskeletal:   There is atrophy of the muscles of the bilateral lower extremities distal to the knee.     Neurological: She is alert and oriented to person, place, and time. She displays no tremor. No cranial nerve deficit. GCS eye subscore is 4. GCS verbal subscore is 3. GCS motor subscore is 6. She displays no Babinski's sign on the right side. She displays no Babinski's sign on the left side.   There is contracture of the bilateral feel and upper extremities at the elbow with rigidity of movement at those joints.    Skin: Skin is warm and dry. No bruising and no rash noted. She is not diaphoretic.   Skin is flushed around the lower face and anterior neck.        Significant Labs:   CBC:   Recent Labs   Lab 12/08/18  0347 12/09/18  0528   WBC 9.95 11.22   HGB 8.0* 8.3*   HCT 25.3* 26.9*    378*     CMP:   Recent Labs   Lab 12/08/18  1203 12/08/18 2007 12/09/18  0656    145 143   K 4.4 4.5 4.8    107 104   CO2 26 22* 24   * 77 103   BUN 12 11 12   CREATININE 0.8 0.8 0.8   CALCIUM 8.7 8.6* 8.6*   ANIONGAP 12 16 15   EGFRNONAA >60.0 >60.0 >60.0       Significant Imaging: I have reviewed all pertinent imaging results/findings within the past 24 hours.  I have reviewed and interpreted all pertinent imaging results/findings within the past 24 hours.    Assessment/Plan:      * Fever    · Differentials here include recurrent aspiration pneumonia secondary to dysphagia, bacteremia, and UTI.  · Admitted at OSH on 11/18 for high-grade SBO with recurrent bilateral pneumonia on CT scan on 11/26 at OSH.   · Hospital course at OSH complicated by symptoms of serotonin syndrome vs NMS with fever up to 107, status post Dantrolene, and contributed to Linezolid usage partly.   · Status post multiple courses of broad spectrum coverage including Linezolid, Cefepime, and Gentamicin; restarted on 12/4 with Vancomycin, Cefepime, and Diflucan after re-developing fevers. Urine culture at OSH positive for Candida.   · PICC line placed at OSH; unclear when inserted; will attempt to contact OSH as not clear in transfer  paperwork.   · Admission labs notable for leukocytosis of 17 with 80% granulocytes, and lactic of 2.9; restarted Vanc/Zosyn on 12/6.  · Afebrile overnight. Last fever 101.4 on 12/6  · Leukocytosis resolved this morning. Lactate 1.2 as of 12/7.   · CXR here unremarkable.     · Blood cultures ordered. UA unremarkable for infection.    · CT A/P (12/7) without evidence of abdominal infection, showed expected post-operative changes, but showed possible RLL consolidation concerning for possible aspiration pneumonia vs pneumonitis.    · ID consulted given persistent fevers without source status post multiple broad spectrum antibiotics; recommending holding all antibiotics after 12/7 and re-culturing if patient spikes fever again. Based on CT findings, have low threshold for starting Unasyn if patient decompensates.   · Consulted neurology for mental status changes; considering serotonin syndrome as possible explanation for fever, vital signs, and mental status changes. Started on cyproheptadine          Hypernatremia    · Sodium was only 145 on admission on 11/18.   · Noted to have progressively increasing Na at OSH up to 153 prior to discharge. Could be explanation for patient's acute mental status change.   · Repeat here 154.   · Evaluated by nephrology there; treated on and off with D5NS and free water, but without consistent correction.   · Resolved        Dysphagia    · Noted to have severe oropharyngeal dysphagia.   · Failed 2 Modified barium swallows, failed again on 12/5; OSH recommendations were for non-oral feeding. Most recent barium swallow assessed as silent aspirator.   · She initially had J-tube after last abdominal surgery, but fell out without attempt on repair.   · GI tried to place a PEG 1 year ago; but was unable.   · IR would not want to place J-tube.   · General surgery at [facility] declined to operate secondary to complex anatomy.   · NG tube was in for bulk of hospitalization for decompression and  then tube feedings, but removed for concern of nasopharyngeal erosions.   · Speech evaluated here; recommended strict NPO with aspiration precautions.   · General surgery evaluated patient afternoon of 12/7; will review CT scan findings and decide on possible PEG.   · Consulted dietary/nutrition: Tube feed recommendations placed for initiation.  · Tube feeds at goal will need to watch for refeeding syndrome         Hypothyroidism    · On Synthroid at home.   · Holding with aspiration precautions.        Atrial fibrillation    · Rate controlled with Bisoprolol at home per outside records.   · CHADVASC score of 3; on no anticoagulation.   · Will resume home Bisoprolol           Dementia without behavioral disturbance    · Baseline status unknown. Upper level spoke with daughter Terri (phone number on sticky note). She states that since 2016 her mother's mental status has been declining she used to be able to do all her ADLs until she started having her abdominal surgeries due to a volvulus.  She was placed in a nursing home in 2016. In the last 4 months she has been less verbal and presented to the OSH for a chocking episode which lead to a cardiac arrest.  · Patient was noted to be awake, alert, and conversing throughout hospital stay at OSH after resolution of possible serotonin syndrome in ICU.   · Noted to have mental status change associated with minimal verbal response after redevelopment of fevers. CT head and LP at OSH unremarkable.   · Noted to be awake and alert, but with disordered response to questions on admission, no change today.   · Cannot resume home dementia medications with aspiration precautions.   · Neurology consulted on 12/8 for mental status changes/worsening dementia; per recommendations, ordered MRI brain with/without contrast which did not show any intracranial abnormalities   · Discussed with neurology possibility of serotonin syndrome residual , decided to start on cryptoheptadine 4 mg  TID for today and watch          GERD (gastroesophageal reflux disease)    · Holding home PPI with aspiration precautions.        HLD (hyperlipidemia)    · No prior lipid panel in system.   · Holding home Atorvastatin with aspiration precautions.         Essential hypertension    · Controlled.   · Holding home BP medications as she is hypotensive  · Watch BP with cyproheptadine   · Vitals ordered Q4H.          VTE Risk Mitigation (From admission, onward)        Ordered     heparin (porcine) injection 5,000 Units  Every 12 hours      12/07/18 1400     Place sequential compression device  Until discontinued      12/06/18 1138     IP VTE LOW RISK PATIENT  Once      12/06/18 1138          Patient seen and examined this morning. Discussed with Dr. castaneda  - staff attestation to follow.        Jeaneth Boyd MD  Department of Hospital Medicine   [facility]

## 2018-12-09 NOTE — ASSESSMENT & PLAN NOTE
· Noted to have severe oropharyngeal dysphagia.   · Failed 2 Modified barium swallows, failed again on 12/5; OSH recommendations were for non-oral feeding. Most recent barium swallow assessed as silent aspirator.   · She initially had J-tube after last abdominal surgery, but fell out without attempt on repair.   · GI tried to place a PEG 1 year ago; but was unable.   · IR would not want to place J-tube.   · General surgery at [facility] declined to operate secondary to complex anatomy.   · NG tube was in for bulk of hospitalization for decompression and then tube feedings, but removed for concern of nasopharyngeal erosions.   · Speech evaluated here; recommended strict NPO with aspiration precautions.   · General surgery evaluated patient afternoon of 12/7; will review CT scan findings and decide on possible PEG.   · Consulted dietary/nutrition: Tube feed recommendations placed for initiation.  · Tube feeds at goal will need to watch for refeeding syndrome

## 2018-12-09 NOTE — SUBJECTIVE & OBJECTIVE
Interval History: Started on tube feeds, MRI negative for intracranial pathology, remains afebrile. Daughter updated on the phone.   Review of Systems   Unable to perform ROS: Patient nonverbal     Objective:     Vital Signs (Most Recent):  Temp: 98.5 °F (36.9 °C) (12/09/18 1139)  Pulse: 105 (12/09/18 1139)  Resp: 18 (12/09/18 1139)  BP: (!) 99/57 (12/09/18 1139)  SpO2: (!) 93 % (12/09/18 1139) Vital Signs (24h Range):  Temp:  [97.5 °F (36.4 °C)-98.7 °F (37.1 °C)] 98.5 °F (36.9 °C)  Pulse:  [] 105  Resp:  [18-20] 18  SpO2:  [93 %-99 %] 93 %  BP: ()/(57-82) 99/57     Weight: 55.3 kg (122 lb)  Body mass index is 21.61 kg/m².    Intake/Output Summary (Last 24 hours) at 12/9/2018 1256  Last data filed at 12/9/2018 0100  Gross per 24 hour   Intake 30 ml   Output 1150 ml   Net -1120 ml      Physical Exam   Constitutional: She is oriented to person, place, and time. She appears well-developed and well-nourished. No distress.   HENT:   Head: Normocephalic and atraumatic.   Mouth/Throat: Uvula is midline, oropharynx is clear and moist and mucous membranes are normal. Abnormal dentition. No dental abscesses. No tonsillar exudate.   Eyes: Conjunctivae are normal. Pupils are equal, round, and reactive to light. No scleral icterus.   Neck: Neck supple. No JVD present.   Cardiovascular: Regular rhythm, normal heart sounds and normal pulses. Tachycardia present.   No murmur heard.  Pulmonary/Chest: Effort normal and breath sounds normal. No respiratory distress. She has no decreased breath sounds. She has no rales.   Abdominal: Soft. Normal appearance and bowel sounds are normal. She exhibits no distension. There is no tenderness.   Colostomy bag in RLQ; multiple well-healed previous abdominal surgery scars.    Genitourinary:   Genitourinary Comments: Hoover inserted.    Musculoskeletal:   There is atrophy of the muscles of the bilateral lower extremities distal to the knee.    Neurological: She is alert and oriented to  person, place, and time. She displays no tremor. No cranial nerve deficit. GCS eye subscore is 4. GCS verbal subscore is 3. GCS motor subscore is 6. She displays no Babinski's sign on the right side. She displays no Babinski's sign on the left side.   There is contracture of the bilateral feel and upper extremities at the elbow with rigidity of movement at those joints.    Skin: Skin is warm and dry. No bruising and no rash noted. She is not diaphoretic.   Skin is flushed around the lower face and anterior neck.        Significant Labs:   CBC:   Recent Labs   Lab 12/08/18  0347 12/09/18  0528   WBC 9.95 11.22   HGB 8.0* 8.3*   HCT 25.3* 26.9*    378*     CMP:   Recent Labs   Lab 12/08/18  1203 12/08/18 2007 12/09/18  0656    145 143   K 4.4 4.5 4.8    107 104   CO2 26 22* 24   * 77 103   BUN 12 11 12   CREATININE 0.8 0.8 0.8   CALCIUM 8.7 8.6* 8.6*   ANIONGAP 12 16 15   EGFRNONAA >60.0 >60.0 >60.0       Significant Imaging: I have reviewed all pertinent imaging results/findings within the past 24 hours.  I have reviewed and interpreted all pertinent imaging results/findings within the past 24 hours.

## 2018-12-09 NOTE — PLAN OF CARE
Problem: Patient Care Overview  Goal: Plan of Care Review  Outcome: Ongoing (interventions implemented as appropriate)  Patient resting comfortably, nonverbal, unable to assess orientation, follows commands. NPO status continued, NG tube placed in L nare, verified by xray, Tube feeding started at 10 ml/hr to be titrated q1 hour to goal rate of 40ml/hr.  No falls and injuries overnight. Afebrile during shift. Weight shift positioning as needed. MRI to be completed at the end of shift. Pt stable, will continue to monitor.

## 2018-12-09 NOTE — PLAN OF CARE
Problem: Pressure Ulcer Risk (Norm Scale) (Adult,Obstetrics,Pediatric)  Goal: Skin Integrity  Patient will demonstrate the desired outcomes by discharge/transition of care.  Outcome: Ongoing (interventions implemented as appropriate)   12/09/18 0149   Pressure Ulcer Risk (Norm Scale) (Adult,Obstetrics,Pediatric)   Skin Integrity making progress toward outcome

## 2018-12-09 NOTE — SUBJECTIVE & OBJECTIVE
Subjective:     Interval History: Patient seen and examined this morning. Seems brighter and more responsive this morning. Continues to reply appropriately to questions if asked loudly.   Does report some abdominal pain.   Spoke to daughter this AM - at baseline ambulates in a wheelchair since 2016. States that patient does walk w/ assistance of PT/OT. Able to feed herself and communicate.     Current Neurological Medications:   Cyproheptadine 4 mg TID    Current Facility-Administered Medications   Medication Dose Route Frequency Provider Last Rate Last Dose    acetaminophen oral solution 650 mg  650 mg Oral Q4H PRN Rachel Hansen MD        aspirin chewable tablet 81 mg  81 mg Per NG tube Daily Kelvin Mcadams MD   81 mg at 12/09/18 0811    bisoprolol split tablet 2.5 mg  2.5 mg Per NG tube Daily Kelvin Mcadams MD   2.5 mg at 12/09/18 0811    cyproheptadine 4 mg tablet 4 mg  4 mg Per NG tube TID Jeaneth Boyd MD        dextrose 50% injection 12.5 g  12.5 g Intravenous PRN Jeaneth Boyd MD        dextrose 50% injection 25 g  25 g Intravenous PRN Jeaneth Boyd MD        glucagon (human recombinant) injection 1 mg  1 mg Intramuscular PRN Jeaneth Boyd MD        glucose chewable tablet 16 g  16 g Oral PRN Jeaneth Boyd MD        glucose chewable tablet 24 g  24 g Oral PRN Jeaneth Boyd MD        heparin (porcine) injection 5,000 Units  5,000 Units Subcutaneous Q12H Humberto Kingston MD   5,000 Units at 12/09/18 0811    ondansetron disintegrating tablet 8 mg  8 mg Oral Q8H PRN Jeaneth Boyd MD        polyethylene glycol packet 17 g  17 g Oral BID PRN Jeaneth Boyd MD        sodium chloride 0.9% flush 5 mL  5 mL Intravenous PRN Jeaneth Boyd MD           Review of Systems   Unable to perform ROS: Mental status change   Constitutional: Positive for activity change, diaphoresis and fever.   HENT: Positive for trouble swallowing.    Gastrointestinal: Negative for nausea and  vomiting.   Musculoskeletal:        Stiffness/rigidity, improved from yesterday   Neurological: Positive for tremors     Objective:     Vital Signs (Most Recent):  Temp: 98.5 °F (36.9 °C) (12/09/18 1139)  Pulse: 105 (12/09/18 1139)  Resp: 18 (12/09/18 1139)  BP: (!) 99/57 (12/09/18 1139)  SpO2: (!) 93 % (12/09/18 1139) Vital Signs (24h Range):  Temp:  [97.5 °F (36.4 °C)-98.7 °F (37.1 °C)] 98.5 °F (36.9 °C)  Pulse:  [100-113] 105  Resp:  [18-20] 18  SpO2:  [93 %-99 %] 93 %  BP: ()/(57-82) 99/57     Weight: 55.3 kg (122 lb)  Body mass index is 21.61 kg/m².    Physical Exam  General:  Well-developed, well-nourished, nad, minimally responsive at first. Opens eyes on command. Verbal, limited  HEENT:  NCAT, PERRLA, EOMI, oropharyngeal membrane bloody, noted broken tooth, unclear the chronicity. Noted wound of the lower lip  Neck:  Supple, rigid, but able to move neck from side to side  Resp:  Symmetric expansion, no increased wob  CVS:  No LE edema, peripheral pulses 2+ (radial, dorsalis pedis)  GI:  Abd soft, non-distended, non-tender to palpation  Neurologic Exam:  Mental Status:  AAOx2.  Speech, scant, delayed, but able to answer few questions appropriately.  Cranial Nerves:   PERRLA, EOMI.  Facial movement, sensation intact and symmetric.  Palate raises symmetrically, tongue protrudes midline.    Motor:  Normal bulk and increased tone.  Moves all extremities on command, difficult to assess strength but she is antigravity throughout. Some contractures noted in b/l hands and feet  Sensory:  Intact to light touch at all extremities and face without inattention.    Reflexes:  Biceps, brachioradialis, patellar, Achilles 3+ and symmetric.  No ankle clonus, although hard to assess due to rigidity Coordination: Fine tremor notes on arm extension.  Gait:  Deferred            Significant Labs:   Hemoglobin A1c:   Recent Labs   Lab 12/06/18  1255   HGBA1C 5.6     Blood Culture: No results for input(s): LABBLOO in the last  48 hours.  BMP:   Recent Labs   Lab 12/08/18 0347 12/08/18 1203 12/08/18 2007 12/09/18 0528 12/09/18  0656   GLU 98   < > 142* 77  --  103   *   < > 145 145  --  143   K 5.0   < > 4.4 4.5  --  4.8   *   < > 107 107  --  104   CO2 24   < > 26 22*  --  24   BUN 13   < > 12 11  --  12   CREATININE 0.8   < > 0.8 0.8  --  0.8   CALCIUM 8.7   < > 8.7 8.6*  --  8.6*   MG 1.5*  --   --   --  2.0  --     < > = values in this interval not displayed.     CBC:   Recent Labs   Lab 12/08/18 0347 12/09/18 0528   WBC 9.95 11.22   HGB 8.0* 8.3*   HCT 25.3* 26.9*    378*     CMP:   Recent Labs   Lab 12/08/18 0347 12/08/18 1203 12/08/18 2007 12/09/18 0528 12/09/18  0656   GLU 98   < > 142* 77  --  103   *   < > 145 145  --  143   K 5.0   < > 4.4 4.5  --  4.8   *   < > 107 107  --  104   CO2 24   < > 26 22*  --  24   BUN 13   < > 12 11  --  12   CREATININE 0.8   < > 0.8 0.8  --  0.8   CALCIUM 8.7   < > 8.7 8.6*  --  8.6*   MG 1.5*  --   --   --  2.0  --    ANIONGAP 11   < > 12 16  --  15   EGFRNONAA >60.0   < > >60.0 >60.0  --  >60.0    < > = values in this interval not displayed.     CSF Culture: No results for input(s): CSFCULTURE in the last 48 hours.  CSF Studies: No results for input(s): ALIQUT, APPEARCSF, COLORCSF, CSFWBC, CSFRBC, GLUCCSF, LDHCSF, PROTEINCSF, VDRLCSF in the last 48 hours.  Inflammatory Markers: No results for input(s): SEDRATE, CRP, PROCAL in the last 48 hours.  POCT Glucose: No results for input(s): POCTGLUCOSE in the last 24 hours.  Prealbumin: No results for input(s): PREALBUMIN in the last 48 hours.  Respiratory Culture: No results for input(s): GSRESP, RESPIRATORYC in the last 48 hours.  Urine Culture: No results for input(s): LABURIN in the last 48 hours.  Urine Studies: No results for input(s): COLORU, APPEARANCEUA, PHUR, SPECGRAV, PROTEINUA, GLUCUA, KETONESU, BILIRUBINUA, OCCULTUA, NITRITE, UROBILINOGEN, LEUKOCYTESUR, RBCUA, WBCUA, BACTERIA, SQUAMEPITHEL,  HYALINECASTS in the last 48 hours.    Invalid input(s): Dental Fix RXARTEMIO  All pertinent lab results from the past 24 hours have been reviewed.    Significant Imaging: I have reviewed all pertinent imaging results/findings within the past 24 hours.   MRI  The brain parenchyma appears normal.  No mass lesion, acute hemorrhage, edema or acute infarct.  No abnormal enhancement.  Ventricles and sulci are normal in size for age without evidence of hydrocephalus.  No extra-axial blood or fluid collections.  Normal vascular flow voids are preserved.  Skull/extracranial contents (limited evaluation): Bone marrow signal intensity is normal.     EEG  Pending final read; no evidence of NCSE or other epileptiform activity/seziures upon independent review

## 2018-12-09 NOTE — PLAN OF CARE
Problem: Patient Care Overview  Goal: Plan of Care Review  Outcome: Ongoing (interventions implemented as appropriate)  POC reviewed at bedside. Pt alert and responding to yes no questions. Call light in reach. safety maintained.

## 2018-12-09 NOTE — ASSESSMENT & PLAN NOTE
· Differentials here include recurrent aspiration pneumonia secondary to dysphagia, bacteremia, and UTI.  · Admitted at OSH on 11/18 for high-grade SBO with recurrent bilateral pneumonia on CT scan on 11/26 at OSH.   · Hospital course at OSH complicated by symptoms of serotonin syndrome vs NMS with fever up to 107, status post Dantrolene, and contributed to Linezolid usage partly.   · Status post multiple courses of broad spectrum coverage including Linezolid, Cefepime, and Gentamicin; restarted on 12/4 with Vancomycin, Cefepime, and Diflucan after re-developing fevers. Urine culture at OSH positive for Candida.   · PICC line placed at OSH; unclear when inserted; will attempt to contact OSH as not clear in transfer paperwork.   · Admission labs notable for leukocytosis of 17 with 80% granulocytes, and lactic of 2.9; restarted Vanc/Zosyn on 12/6.  · Afebrile overnight. Last fever 101.4 on 12/6  · Leukocytosis resolved this morning. Lactate 1.2 as of 12/7.   · CXR here unremarkable.     · Blood cultures ordered. UA unremarkable for infection.    · CT A/P (12/7) without evidence of abdominal infection, showed expected post-operative changes, but showed possible RLL consolidation concerning for possible aspiration pneumonia vs pneumonitis.    · ID consulted given persistent fevers without source status post multiple broad spectrum antibiotics; recommending holding all antibiotics after 12/7 and re-culturing if patient spikes fever again. Based on CT findings, have low threshold for starting Unasyn if patient decompensates.   · Consulted neurology for mental status changes; considering serotonin syndrome as possible explanation for fever, vital signs, and mental status changes. Started on cyproheptadine

## 2018-12-10 PROBLEM — G25.79 SEROTONIN SYNDROME: Status: ACTIVE | Noted: 2018-12-08

## 2018-12-10 PROBLEM — G25.79 SEROTONIN SYNDROME: Status: RESOLVED | Noted: 2018-12-08 | Resolved: 2018-12-10

## 2018-12-10 LAB
ANA SER QL IF: NORMAL
ANION GAP SERPL CALC-SCNC: 9 MMOL/L
BACTERIA #/AREA URNS AUTO: ABNORMAL /HPF
BASOPHILS # BLD AUTO: 0.05 K/UL
BASOPHILS NFR BLD: 0.4 %
BILIRUB UR QL STRIP: NEGATIVE
BUN SERPL-MCNC: 18 MG/DL
CALCIUM SERPL-MCNC: 8.9 MG/DL
CHLORIDE SERPL-SCNC: 104 MMOL/L
CLARITY UR REFRACT.AUTO: ABNORMAL
CO2 SERPL-SCNC: 30 MMOL/L
COLOR UR AUTO: YELLOW
CREAT SERPL-MCNC: 0.9 MG/DL
DIFFERENTIAL METHOD: ABNORMAL
EOSINOPHIL # BLD AUTO: 0.2 K/UL
EOSINOPHIL NFR BLD: 1.3 %
ERYTHROCYTE [DISTWIDTH] IN BLOOD BY AUTOMATED COUNT: 17.6 %
EST. GFR  (AFRICAN AMERICAN): >60 ML/MIN/1.73 M^2
EST. GFR  (NON AFRICAN AMERICAN): >60 ML/MIN/1.73 M^2
GLUCOSE SERPL-MCNC: 139 MG/DL
GLUCOSE UR QL STRIP: ABNORMAL
HCT VFR BLD AUTO: 28.4 %
HGB BLD-MCNC: 8.8 G/DL
HGB UR QL STRIP: ABNORMAL
HYALINE CASTS UR QL AUTO: 0 /LPF
IMM GRANULOCYTES # BLD AUTO: 0.13 K/UL
IMM GRANULOCYTES NFR BLD AUTO: 0.9 %
KETONES UR QL STRIP: NEGATIVE
LEUKOCYTE ESTERASE UR QL STRIP: ABNORMAL
LYMPHOCYTES # BLD AUTO: 2.8 K/UL
LYMPHOCYTES NFR BLD: 19.8 %
MAGNESIUM SERPL-MCNC: 2.4 MG/DL
MCH RBC QN AUTO: 29.9 PG
MCHC RBC AUTO-ENTMCNC: 31 G/DL
MCV RBC AUTO: 97 FL
MICROSCOPIC COMMENT: ABNORMAL
MONOCYTES # BLD AUTO: 1.2 K/UL
MONOCYTES NFR BLD: 8.5 %
NEUTROPHILS # BLD AUTO: 9.8 K/UL
NEUTROPHILS NFR BLD: 69.1 %
NITRITE UR QL STRIP: NEGATIVE
NRBC BLD-RTO: 1 /100 WBC
PH UR STRIP: 5 [PH] (ref 5–8)
PHOSPHATE SERPL-MCNC: 2.7 MG/DL
PLATELET # BLD AUTO: 380 K/UL
PMV BLD AUTO: 12.4 FL
POTASSIUM SERPL-SCNC: 4.6 MMOL/L
PROT UR QL STRIP: ABNORMAL
RBC # BLD AUTO: 2.94 M/UL
RBC #/AREA URNS AUTO: 10 /HPF (ref 0–4)
SODIUM SERPL-SCNC: 143 MMOL/L
SP GR UR STRIP: 1.02 (ref 1–1.03)
SQUAMOUS #/AREA URNS AUTO: 12 /HPF
URN SPEC COLLECT METH UR: ABNORMAL
WBC # BLD AUTO: 14.11 K/UL
WBC #/AREA URNS AUTO: 68 /HPF (ref 0–5)
WBC CLUMPS UR QL AUTO: ABNORMAL

## 2018-12-10 PROCEDURE — 87086 URINE CULTURE/COLONY COUNT: CPT

## 2018-12-10 PROCEDURE — 84100 ASSAY OF PHOSPHORUS: CPT

## 2018-12-10 PROCEDURE — 36415 COLL VENOUS BLD VENIPUNCTURE: CPT

## 2018-12-10 PROCEDURE — 99233 PR SUBSEQUENT HOSPITAL CARE,LEVL III: ICD-10-PCS | Mod: GC,,, | Performed by: STUDENT IN AN ORGANIZED HEALTH CARE EDUCATION/TRAINING PROGRAM

## 2018-12-10 PROCEDURE — 25000003 PHARM REV CODE 250: Performed by: STUDENT IN AN ORGANIZED HEALTH CARE EDUCATION/TRAINING PROGRAM

## 2018-12-10 PROCEDURE — 85025 COMPLETE CBC W/AUTO DIFF WBC: CPT

## 2018-12-10 PROCEDURE — 99233 SBSQ HOSP IP/OBS HIGH 50: CPT | Mod: GC,,, | Performed by: STUDENT IN AN ORGANIZED HEALTH CARE EDUCATION/TRAINING PROGRAM

## 2018-12-10 PROCEDURE — 81001 URINALYSIS AUTO W/SCOPE: CPT

## 2018-12-10 PROCEDURE — 63600175 PHARM REV CODE 636 W HCPCS: Performed by: STUDENT IN AN ORGANIZED HEALTH CARE EDUCATION/TRAINING PROGRAM

## 2018-12-10 PROCEDURE — 99232 SBSQ HOSP IP/OBS MODERATE 35: CPT | Mod: GC,,, | Performed by: INTERNAL MEDICINE

## 2018-12-10 PROCEDURE — 99231 PR SUBSEQUENT HOSPITAL CARE,LEVL I: ICD-10-PCS | Mod: GC,,, | Performed by: PSYCHIATRY & NEUROLOGY

## 2018-12-10 PROCEDURE — 20600001 HC STEP DOWN PRIVATE ROOM

## 2018-12-10 PROCEDURE — 87040 BLOOD CULTURE FOR BACTERIA: CPT | Mod: 59

## 2018-12-10 PROCEDURE — 87106 FUNGI IDENTIFICATION YEAST: CPT

## 2018-12-10 PROCEDURE — 99232 PR SUBSEQUENT HOSPITAL CARE,LEVL II: ICD-10-PCS | Mod: GC,,, | Performed by: INTERNAL MEDICINE

## 2018-12-10 PROCEDURE — 83735 ASSAY OF MAGNESIUM: CPT

## 2018-12-10 PROCEDURE — 87088 URINE BACTERIA CULTURE: CPT

## 2018-12-10 PROCEDURE — 80048 BASIC METABOLIC PNL TOTAL CA: CPT

## 2018-12-10 PROCEDURE — 99231 SBSQ HOSP IP/OBS SF/LOW 25: CPT | Mod: GC,,, | Performed by: PSYCHIATRY & NEUROLOGY

## 2018-12-10 RX ORDER — ACETAMINOPHEN 650 MG/20.3ML
650 LIQUID ORAL EVERY 4 HOURS PRN
Status: DISCONTINUED | OUTPATIENT
Start: 2018-12-10 | End: 2018-12-18

## 2018-12-10 RX ORDER — ENOXAPARIN SODIUM 100 MG/ML
40 INJECTION SUBCUTANEOUS EVERY 24 HOURS
Status: DISCONTINUED | OUTPATIENT
Start: 2018-12-10 | End: 2018-12-12

## 2018-12-10 RX ORDER — CYPROHEPTADINE HYDROCHLORIDE 4 MG/1
4 TABLET ORAL 3 TIMES DAILY
Status: DISCONTINUED | OUTPATIENT
Start: 2018-12-10 | End: 2018-12-11

## 2018-12-10 RX ADMIN — ENOXAPARIN SODIUM 40 MG: 100 INJECTION SUBCUTANEOUS at 09:12

## 2018-12-10 RX ADMIN — ACETAMINOPHEN 650 MG: 650 SOLUTION ORAL at 05:12

## 2018-12-10 RX ADMIN — AMPICILLIN SODIUM AND SULBACTAM SODIUM 3 G: 2; 1 INJECTION, POWDER, FOR SOLUTION INTRAMUSCULAR; INTRAVENOUS at 06:12

## 2018-12-10 RX ADMIN — BISOPROLOL FUMARATE 2.5 MG: 5 TABLET ORAL at 09:12

## 2018-12-10 RX ADMIN — ASPIRIN 81 MG CHEWABLE TABLET 81 MG: 81 TABLET CHEWABLE at 09:12

## 2018-12-10 RX ADMIN — CYPROHEPTADINE HYDROCHLORIDE 4 MG: 4 TABLET ORAL at 09:12

## 2018-12-10 RX ADMIN — HEPARIN SODIUM 5000 UNITS: 5000 INJECTION, SOLUTION INTRAVENOUS; SUBCUTANEOUS at 09:12

## 2018-12-10 RX ADMIN — AMPICILLIN SODIUM AND SULBACTAM SODIUM 3 G: 2; 1 INJECTION, POWDER, FOR SOLUTION INTRAMUSCULAR; INTRAVENOUS at 01:12

## 2018-12-10 NOTE — ASSESSMENT & PLAN NOTE
Patient is a 67 yo female w/ past complicated medical history presents as a transfer from OSH w/ primary diagnosis of fever w/ culture negative workup, rigidity, AMS w/ decreased responsiveness, hyperreflexia. Per records, patient was given Linezolid, Zofran and Fentanyl at one point of time at the OSH and had developed rapid onset above symptoms. From the notes, patient was presumed to have NMS vs SS and the offending agents were stopped as well as patient was give Dantrolene 140 mg - patient did show improvement in her muscle rigidity, which is expected.   She later developed similar symptoms on 12/04, and although her MAR was evaluated for any other offending agents, none were found. She was transferred here on 12/06 for further work- up of the above symptoms.   Infectious work-up has been unremarkable.     Patient continues to improve, significantly so w/ administration of cyproheptadine. This is consistent w/ the thought that patient's presentation is due to resolving serotonin syndrome, as described above.  Patient does appear to have some baseline rigidity ( LE>UE), and she has been wheelchair bound for the past 2 years. Should follow up w/ Neurology on outpatient bases to further evaluate this chronic issue.   UTI management per primary team.      - Hypertonia significantly improved on today's exam   - History consistent w/ serotonin syndrome, as the above mentioned agents ( Zofran, Linezolid and Fentanyl) have all been associated with the development of this condition. Dantrolene would show improvement in patient's rigidity but would not be an appropriate treatment for SS.  - CPK ordered - WNL now; but patient has been receiving IVF etc  - No mention of any neuroleptic medication on patient's OSH records  - Cyproheptadine as the treatment modality for SS; monitor for hypotension  - has shown improvement in her tone since administration of the therapy  - EEG ordered - upon review with staff no evidence of  NCSE or diffuse cortical dysfunction  - MRI w/o any abnormalities   - [place] [facility] [place] an external referral needed for this patient upon discharge  - Appreciate consult; will sign off at this time; please call with any additional questions.

## 2018-12-10 NOTE — ASSESSMENT & PLAN NOTE
· Noted to have severe oropharyngeal dysphagia.   · Failed 2 Modified barium swallows, failed again on 12/5; OSH recommendations were for non-oral feeding. Most recent barium swallow assessed as silent aspirator.   · She initially had J-tube after last abdominal surgery, but fell out without attempt on repair.   · GI tried to place a PEG 1 year ago; but was unable.   · IR would not want to place J-tube.   · General surgery at [facility] declined to operate secondary to complex anatomy.   · NG tube was in for bulk of hospitalization for decompression and then tube feedings, but removed for concern of nasopharyngeal erosions.   · Speech evaluated here; recommended strict NPO with aspiration precautions.   · General surgery evaluated patient afternoon of 12/7; will review CT scan findings and decide on possible PEG.   · Consulted dietary/nutrition: Tube feed recommendations placed for initiation. Tube feeds at goal, but will need to watch for refeeding syndrome. Mg and P with daily labs.

## 2018-12-10 NOTE — SUBJECTIVE & OBJECTIVE
Interval History: Febrile to 100.7 F this am; started on unasyn by primary team. Patient remains nonverbal.     Review of Systems   Unable to perform ROS: Dementia     Objective:     Vital Signs (Most Recent):  Temp: 98 °F (36.7 °C) (12/10/18 1141)  Pulse: 97 (12/10/18 1141)  Resp: 18 (12/10/18 1141)  BP: 111/67 (12/10/18 1141)  SpO2: (!) 94 % (12/10/18 1141) Vital Signs (24h Range):  Temp:  [98 °F (36.7 °C)-100.7 °F (38.2 °C)] 98 °F (36.7 °C)  Pulse:  [] 97  Resp:  [16-20] 18  SpO2:  [92 %-97 %] 94 %  BP: (109-126)/(60-72) 111/67     Weight: 55.3 kg (122 lb)  Body mass index is 21.61 kg/m².    Estimated Creatinine Clearance: 50.9 mL/min (based on SCr of 0.9 mg/dL).    Physical Exam   Constitutional: No distress.   Elderly, chronically-ill appearing white female   HENT:   Head: Normocephalic and atraumatic.   Mouth/Throat: Oropharynx is clear and moist.   Eyes: Conjunctivae are normal. No scleral icterus.   Neck: Normal range of motion.   Cardiovascular: Exam reveals no friction rub.   No murmur heard.  tachycardic   Pulmonary/Chest: Effort normal and breath sounds normal. No stridor. No respiratory distress. She has no wheezes.   Abdominal: Soft. Bowel sounds are normal. She exhibits no distension. There is no tenderness.   Musculoskeletal:   Stiffness on passive ROM of arms. Resting tremor noted   Lymphadenopathy:     She has no cervical adenopathy.   Neurological: She is alert.   Awake, alert, nonverbal but tracks with eyes   Skin: Skin is warm and dry. No rash noted. She is not diaphoretic.       Significant Labs: All pertinent labs within the past 24 hours have been reviewed.    Significant Imaging: I have reviewed all pertinent imaging results/findings within the past 24 hours.

## 2018-12-10 NOTE — NURSING
Pt with fever x2, tylenol administered team notified, Blood culture and urine cultures ordered, chest xray ordered.will continue to monitor

## 2018-12-10 NOTE — SUBJECTIVE & OBJECTIVE
Subjective:     Interval History: Patient seen and examined this morning. Appropriately responds to questions this AM, although the examiner must raise voice as patient is hard of hearing.   Patient alert and oriented X 2, self and place (hospital). Denies any pain today. States that she is feeling well.   Low grade fever overnight, patient found to have UA concerning for UTI.     Current Neurological Medications:   Cyproheptadine 4 mg TID    Current Facility-Administered Medications   Medication Dose Route Frequency Provider Last Rate Last Dose    acetaminophen oral solution 650 mg  650 mg Per NG tube Q4H PRN Humberto Kingston MD        ampicillin-sulbactam 3 g in sodium chloride 0.9 % 100 mL IVPB (ready to mix system)  3 g Intravenous Q6H Humberto Kingston  mL/hr at 12/10/18 1327 3 g at 12/10/18 1327    aspirin chewable tablet 81 mg  81 mg Per NG tube Daily Kelvin Mcadams MD   81 mg at 12/10/18 0958    bisoprolol split tablet 2.5 mg  2.5 mg Per NG tube Daily Kelvin Mcadams MD   2.5 mg at 12/10/18 0958    dextrose 50% injection 12.5 g  12.5 g Intravenous PRN Jeaneth Boyd MD        dextrose 50% injection 25 g  25 g Intravenous PRN Jeaneth Boyd MD        glucagon (human recombinant) injection 1 mg  1 mg Intramuscular PRN Jeaneth Boyd MD        glucose chewable tablet 16 g  16 g Oral PRN Jeaneth Boyd MD        glucose chewable tablet 24 g  24 g Oral PRN Jeaneth Boyd MD        heparin (porcine) injection 5,000 Units  5,000 Units Subcutaneous Q12H Humberto Kingston MD   5,000 Units at 12/10/18 0958    ondansetron disintegrating tablet 8 mg  8 mg Oral Q8H PRN Jeaneth Boyd MD        sodium chloride 0.9% flush 5 mL  5 mL Intravenous PRN Jeaneth Boyd MD           Review of Systems     Unable to perform ROS: Mental status change   Constitutional: Positive for activity change, diaphoresis and fever.   HENT: Positive for trouble swallowing.    Gastrointestinal:  Negative for nausea and vomiting.   Musculoskeletal:        Stiffness/rigidity much improved, although still present, concerning for some baseline rigidity   Neurological: Positive for tremors     Objective:     Vital Signs (Most Recent):  Temp: 98 °F (36.7 °C) (12/10/18 1141)  Pulse: 97 (12/10/18 1141)  Resp: 18 (12/10/18 1141)  BP: 111/67 (12/10/18 1141)  SpO2: (!) 94 % (12/10/18 1141) Vital Signs (24h Range):  Temp:  [98 °F (36.7 °C)-100.7 °F (38.2 °C)] 98 °F (36.7 °C)  Pulse:  [] 97  Resp:  [16-20] 18  SpO2:  [92 %-97 %] 94 %  BP: (109-126)/(60-72) 111/67     Weight: 55.3 kg (122 lb)  Body mass index is 21.61 kg/m².    Physical Exam  General:  Well-developed, well-nourished, nad, minimally responsive at first. Opens eyes on command. Communicates appropriately if able to hear the question   HEENT:  NCAT, PERRLA, EOMI, oropharyngeal membrane bloody, noted broken tooth, unclear the chronicity. Noted wound of the lower lip  Neck:  Supple, rigid, but able to move neck from side to side  Resp:  Symmetric expansion, no increased wob  CVS:  No LE edema, peripheral pulses 2+ (radial, dorsalis pedis)  GI:  Abd soft, non-distended, non-tender to palpation  Neurologic Exam:  Mental Status:  AAOx2.  Speech, scant, delayed, but able to answer questions appropriately.  Cranial Nerves:   PERRLA, EOMI.  Facial movement, sensation intact and symmetric.  Palate raises symmetrically, tongue protrudes midline.    Motor:  Normal bulk and increased tone, although much improved from first day of evaluation.  Moves all extremities on command, difficult to assess strength but she is antigravity throughout. Some contractures noted in b/l hands and feet  Sensory:  Intact to light touch at all extremities and face without inattention.    Reflexes:  Biceps, brachioradialis, patellar, Achilles 2+ and symmetric.    Coordination: Fine tremor notes on arm extension.  Gait:  Deferred          Significant Labs:   Hemoglobin A1c:   Recent  Labs   Lab 12/06/18  1255   HGBA1C 5.6     Blood Culture: No results for input(s): LABBLOO in the last 48 hours.  BMP:   Recent Labs   Lab 12/08/18  2007 12/09/18  0528 12/09/18  0656 12/10/18  0448   GLU 77  --  103 139*     --  143 143   K 4.5  --  4.8 4.6     --  104 104   CO2 22*  --  24 30*   BUN 11  --  12 18   CREATININE 0.8  --  0.8 0.9   CALCIUM 8.6*  --  8.6* 8.9   MG  --  2.0  --  2.4     CBC:   Recent Labs   Lab 12/09/18  0528 12/10/18  0448   WBC 11.22 14.11*   HGB 8.3* 8.8*   HCT 26.9* 28.4*   * 380*     CMP:   Recent Labs   Lab 12/08/18  2007 12/09/18  0528 12/09/18  0656 12/10/18  0448   GLU 77  --  103 139*     --  143 143   K 4.5  --  4.8 4.6     --  104 104   CO2 22*  --  24 30*   BUN 11  --  12 18   CREATININE 0.8  --  0.8 0.9   CALCIUM 8.6*  --  8.6* 8.9   MG  --  2.0  --  2.4   ANIONGAP 16  --  15 9   EGFRNONAA >60.0  --  >60.0 >60.0     CSF Culture: No results for input(s): CSFCULTURE in the last 48 hours.  CSF Studies: No results for input(s): ALIQUT, APPEARCSF, COLORCSF, CSFWBC, CSFRBC, GLUCCSF, LDHCSF, PROTEINCSF, VDRLCSF in the last 48 hours.  Inflammatory Markers: No results for input(s): SEDRATE, CRP, PROCAL in the last 48 hours.  POCT Glucose: No results for input(s): POCTGLUCOSE in the last 24 hours.  Prealbumin: No results for input(s): PREALBUMIN in the last 48 hours.  Respiratory Culture: No results for input(s): GSRESP, RESPIRATORYC in the last 48 hours.  Urine Culture: No results for input(s): LABURIN in the last 48 hours.  Urine Studies:   Recent Labs   Lab 12/10/18  0535   COLORU Yellow   APPEARANCEUA Cloudy*   PHUR 5.0   SPECGRAV 1.020   PROTEINUA 1+*   GLUCUA 1+*   KETONESU Negative   BILIRUBINUA Negative   OCCULTUA 2+*   NITRITE Negative   LEUKOCYTESUR 3+*   RBCUA 10*   WBCUA 68*   BACTERIA Occasional   SQUAMEPITHEL 12   HYALINECASTS 0     All pertinent lab results from the past 24 hours have been reviewed.    Significant Imaging: I have  reviewed all pertinent imaging results/findings within the past 24 hours.   MRI  The brain parenchyma appears normal.  No mass lesion, acute hemorrhage, edema or acute infarct.  No abnormal enhancement.  Ventricles and sulci are normal in size for age without evidence of hydrocephalus.  No extra-axial blood or fluid collections.  Normal vascular flow voids are preserved.  Skull/extracranial contents (limited evaluation): Bone marrow signal intensity is normal.     EEG  Pending final read; no evidence of NCSE or other epileptiform activity/seziures upon independent review

## 2018-12-10 NOTE — ASSESSMENT & PLAN NOTE
· Differentials here include recurrent aspiration pneumonia secondary to dysphagia, bacteremia, and UTI.  · Admitted at OSH on 11/18 for high-grade SBO with recurrent bilateral pneumonia on CT scan on 11/26 at OSH.   · Hospital course at OSH complicated by symptoms of serotonin syndrome vs NMS with fever up to 107, status post Dantrolene, and contributed to Linezolid usage partly.   · Status post multiple courses of broad spectrum coverage including Linezolid, Cefepime, and Gentamicin; restarted on 12/4 with Vancomycin, Cefepime, and Diflucan after re-developing fevers. Urine culture at OSH positive for Candida.   · PICC line placed at OSH; unclear when inserted; will attempt to contact OSH as not clear in transfer paperwork.   · Admission labs notable for leukocytosis of 17 with 80% granulocytes, and lactic of 2.9; restarted Vanc/Zosyn on 12/6.  · HIV, RPR, RF negative. JÚNIOR pending.   · CT A/P (12/7) without evidence of abdominal infection, showed expected post-operative changes, but showed possible RLL consolidation concerning for possible aspiration pneumonia vs pneumonitis.    · ID consulted given persistent fevers without source status post multiple broad spectrum antibiotics; recommending holding all antibiotics after 12/7 and re-culturing if patient spikes fever again. Based on CT findings, have low threshold for starting Unasyn if patient decompensates.   · Febrile 12/10 to 100.7; blood cultures, UA, and CXR repeated.   · Leukocytosis returned this morning to 14. Lactate 1.2 as of 12/7.  Started Unasyn for suspected aspiration PNA based on CT A/P.   · Consulted neurology on 12/08 for mental status changes; considering serotonin syndrome as possible explanation for fever, vital signs, and mental status changes given medications at OSH more associated with that compared to NMS. Started on cyproheptadine on 12/09.

## 2018-12-10 NOTE — PLAN OF CARE
Problem: Patient Care Overview  Goal: Plan of Care Review  Outcome: Ongoing (interventions implemented as appropriate)  VSS. Disoriented. TF @ 40mL/hr. Turned q2h. Call light in reach. No acute changes. Safety maintained. Will continue to monitor.

## 2018-12-10 NOTE — ASSESSMENT & PLAN NOTE
This is a 68 year old female who has had complicated hospitalization over the past month with empiric treatment for fevers and leukocytosis with no definitive source identified (initially some concern for aspiration pneumonia). It is unclear at this time if the patient's continued fevers are due to infectious source or if there is a non-infectious etiology.     Workup to date:   LP: 4 WBC, glucose 118, protein 24, H. Flu negative, S. Pneumoniae negative, N. Meningitidis negative  CT Head: unremarkable  Urinalysis: rare yeast, trace leuk, occasional bacteria  Repeat UA 12/10: 68 WBC, few clumps, occasional bacteria  CXR: mild opacification of RUL  Procalcitonin: 0.30  HIV: negative  CT: consolidation concerning for aspiration  TTE: no evidence of infection    Blood cultures (drawn 12/6): NGTD    RECOMMENDATIONS:     - agree with decision to start IV Unasyn, which should provide adequate coverage of UTI and aspiration pneumonia  - no need to treat candiduria at this time  - her degree of dementia puts her at risk for continued aspiration

## 2018-12-10 NOTE — SUBJECTIVE & OBJECTIVE
Interval History: Seen at bedside this AM. Patient appears more awake and alert after starting tube feeds. But remains non-verbal. Eyes tract movement well and she follows commands.     Review of Systems   Unable to perform ROS: Patient nonverbal     Objective:     Vital Signs (Most Recent):  Temp: 98.1 °F (36.7 °C) (12/10/18 0821)  Pulse: 108 (12/10/18 0821)  Resp: 16 (12/10/18 0821)  BP: 119/66 (12/10/18 0821)  SpO2: 97 % (12/10/18 0821) Vital Signs (24h Range):  Temp:  [98.1 °F (36.7 °C)-100.7 °F (38.2 °C)] 98.1 °F (36.7 °C)  Pulse:  [101-108] 108  Resp:  [16-20] 16  SpO2:  [92 %-97 %] 97 %  BP: ()/(57-72) 119/66     Weight: 55.3 kg (122 lb)  Body mass index is 21.61 kg/m².    Intake/Output Summary (Last 24 hours) at 12/10/2018 1026  Last data filed at 12/10/2018 0600  Gross per 24 hour   Intake 720 ml   Output 200 ml   Net 520 ml      Physical Exam   Constitutional: She is oriented to person, place, and time. She appears well-developed and well-nourished. No distress.   HENT:   Head: Normocephalic and atraumatic.   Mouth/Throat: Uvula is midline, oropharynx is clear and moist and mucous membranes are normal. Abnormal dentition. No dental abscesses. No tonsillar exudate.   Eyes: Conjunctivae are normal. Pupils are equal, round, and reactive to light. No scleral icterus.   Neck: Neck supple. No JVD present.   Cardiovascular: Regular rhythm, normal heart sounds and normal pulses. Tachycardia present.   No murmur heard.  Pulmonary/Chest: Effort normal and breath sounds normal. No respiratory distress. She has no decreased breath sounds. She has no rales.   Abdominal: Soft. Normal appearance and bowel sounds are normal. She exhibits no distension. There is no tenderness.   Colostomy bag in RLQ; multiple well-healed previous abdominal surgery scars.    Genitourinary:   Genitourinary Comments: Hoover inserted.    Musculoskeletal:   There is atrophy of the muscles of the bilateral lower extremities distal to the  knee.    Neurological: She is alert and oriented to person, place, and time. She displays no tremor. No cranial nerve deficit. GCS eye subscore is 4. GCS verbal subscore is 3. GCS motor subscore is 6. She displays no Babinski's sign on the right side. She displays no Babinski's sign on the left side.   There is contracture of the bilateral feel and upper extremities at the elbow with rigidity of movement at those joints.    Skin: Skin is warm and dry. No bruising and no rash noted. She is not diaphoretic.   Skin is flushed around the lower face and anterior neck.        Significant Labs:   CBC:   Recent Labs   Lab 12/09/18 0528 12/10/18  0448   WBC 11.22 14.11*   HGB 8.3* 8.8*   HCT 26.9* 28.4*   * 380*     CMP:   Recent Labs   Lab 12/08/18 2007 12/09/18  0656 12/10/18  0448    143 143   K 4.5 4.8 4.6    104 104   CO2 22* 24 30*   GLU 77 103 139*   BUN 11 12 18   CREATININE 0.8 0.8 0.9   CALCIUM 8.6* 8.6* 8.9   ANIONGAP 16 15 9   EGFRNONAA >60.0 >60.0 >60.0       Significant Imaging: I have reviewed all pertinent imaging results/findings within the past 24 hours.  I have reviewed and interpreted all pertinent imaging results/findings within the past 24 hours.

## 2018-12-10 NOTE — PROGRESS NOTES
"[facility] Hospital Medicine  Progress Note    Patient Name: Silvia Ornelas  MRN: [id number]  Patient Class: IP- Inpatient   Admission Date: 12/6/2018  Length of Stay: 4 days  Attending Physician: Humberto Kingston MD  Primary Care Provider: [facility] Medicine Team: [facility]OSP MED 5 Kelvin Mcadams MD    Subjective:     Principal Problem:Fever    HPI:  This is a 68 year old female transferred from [facility] for fever, requesting ID and general surgery consultations. She has a PMH of severe dementia (baseline status unknown), Afib (rate controlled, not on anticoagulation), HTN, HLD, GERD, and hypothyroidism. 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     History obtained form [facility] paper chart review, secondary to patient's mental status.     She was hospitalized at [facility] on 11/10/18 for suspected aspiration pneumonia; status post treatment with Vanc/Zosyn. She was noted to have abnormal barium swallow at that time; PEG tube placement again discussed with family; they declined. Discharged back to nursing home on 11/16.     She presented again on 11/18/18 for new onset of nausea and vomiting. On presentation, labs significant for BRODY (BUN 24, Cr 2.6). CT A/P showed high-grade small bowel obstruction and bilateral lower lobe consolidations. She was started on Linezolid and Cefepime for suspected bilateral aspiration pneumonia. Her SBO was managed by general surgery with conservative " measures, including NG decompression, pain medications, and IVF.  She then began developing leukocytosis to 18, with new fevers (Tmax 101) on 11/23; 5 day course of Gentamicin was added on 11/22. On 11/26, she was noted to be passing gas and having bowel movements, but continued to have high NG bilious outptut. Repeat CT showed resolving SBO, but continued bilateral lower lobe consolidations. On 11/27, she spiked a fever to 107 (rectal probe) and developed new onset of extremity rigidity, hyperreflexia, and fine tremor, associated with worsening leukocytosis to 28, and lactic of 3. She was assessed to have serotonin syndrome secondary vs NMS due to possible combination of Linezolid, Dilaudid, Fentanyl, and Zofran. She received 140 mg of Dantrolene for possible NMS with symptom improvement; Linezolid discontinued. Patient's antibiotics switched to vancomycin and cefepime for aspiration pneumonitis, WBC was downtrending, and both antibiotics stopped on 12/3 after completed of 6-7 day antibiotic course.  Patient noted to be alert, conversant and oriented to self and with WBC downtrending, antibiotics stopped. Step down from ICU.    Overnight on 12/4, she had resumption of fevers (Tmax 101-102) with again features again of rigidity. Mental status change declined again with minimal  verbal response; patient noted to be ill appearing and diaphoretic.  Medications reviewed with pharmacy to ensure no active medications might be precipitating Serotonin Syndrome. CT head was unremarkable. Blood and urine cultures repeated. Hoover catheter exchanged. LP performed; gram stain negative, but other analyses pending. In discussion of case, decision made to watch patient overnight and re-eval on 12/5. On 12/5, her Vancomycin and Cefepime restarted, also on Diflucan for urine culture positive for Candida.     Additionally, she was noted to have severe oropharyngeal dysphagia. She failed 2 Modified barium swallows, failed again on  12/5; OSH recommendations were for non-oral feeding. She initially had J-tube after last abdominal surgery, but fell out without attempt on repair. GI tried to place a PEG 1 year ago; but was unable.  IR would not want to place J-tube. General surgery at [facility] declined to operate secondary to complex anatomy.  NG tube was in for bulk of hospitalization for decompression and then tube feedings, but removed for concern of nasopharyngeal erosions.      Hospital Course:  12/6: Admission labs notable for leukocytosis of 17, lactic of 2.4, sodium of 154. Restarted on Vanc/Zosyn. ICU consulted for hypernatremia management; recommended D5W correction based on free water deficit. Started on 14 hours of D5W at 70 mL/hr.   12/7: Overnight, patient noted to be tachycardic to 120, with temperature spike to 101.4. Unable to give Tylenol because of NPO/aspiratioin precautions; defervescence without intervention. Leukocytosis down to 13, lactic down to 1.2. Sodium improved to 151; restarted on D5W 70 mL for 10 hours. Consulted general surgery and ID for further recommendations. Per ID, discontinued all antibiotics. Ordered HIV, RPR, EEG, JÚNIOR, and RF. Ordered CT A/P: no abscess, expected post-operative anatomy changes, but showing RLL consolidation possibly aspiration pneumonia. Patient remains awake, alert, but with disordered and delayed response to questioning.   12/8: No events overnight. Afebrile. Tachycardia improved to lower 100's. Leukocytosis resolved (WBC 10). Sodium normalized to 145. NG restarted with tube feeds and some home medications. Pending surgery decision on possible PEG after they review CT findings. Consulted neurology because of concern of mental status changes reported by family over hospital course. Ordered MRI with/without contrast. Considering serotonin syndrome as possible explanation for fevers, tachycardia, mental status, and contractures on exam.   12/9: No events overnight. Afebrile. No  leukocytosis. Still borderline tachycardia. MRI unremarkable. Started on Cyproheptadine.   12/10:  Febrile overnight to 100.7. Leukocytosis returned to 14. Blood and urine CX repeated. CXR without signs of infection. Started Unasyn, based on RLL consolidation on CT A/P.     Interval History: Seen at bedside this AM. Patient appears more awake and alert after starting tube feeds. But remains non-verbal. Eyes tract movement well and she follows commands.     Review of Systems   Unable to perform ROS: Patient nonverbal     Objective:     Vital Signs (Most Recent):  Temp: 98.1 °F (36.7 °C) (12/10/18 0821)  Pulse: 108 (12/10/18 0821)  Resp: 16 (12/10/18 0821)  BP: 119/66 (12/10/18 0821)  SpO2: 97 % (12/10/18 0821) Vital Signs (24h Range):  Temp:  [98.1 °F (36.7 °C)-100.7 °F (38.2 °C)] 98.1 °F (36.7 °C)  Pulse:  [101-108] 108  Resp:  [16-20] 16  SpO2:  [92 %-97 %] 97 %  BP: ()/(57-72) 119/66     Weight: 55.3 kg (122 lb)  Body mass index is 21.61 kg/m².    Intake/Output Summary (Last 24 hours) at 12/10/2018 1026  Last data filed at 12/10/2018 0600  Gross per 24 hour   Intake 720 ml   Output 200 ml   Net 520 ml      Physical Exam   Constitutional: She is oriented to person, place, and time. She appears well-developed and well-nourished. No distress.   HENT:   Head: Normocephalic and atraumatic.   Mouth/Throat: Uvula is midline, oropharynx is clear and moist and mucous membranes are normal. Abnormal dentition. No dental abscesses. No tonsillar exudate.   Eyes: Conjunctivae are normal. Pupils are equal, round, and reactive to light. No scleral icterus.   Neck: Neck supple. No JVD present.   Cardiovascular: Regular rhythm, normal heart sounds and normal pulses. Tachycardia present.   No murmur heard.  Pulmonary/Chest: Effort normal and breath sounds normal. No respiratory distress. She has no decreased breath sounds. She has no rales.   Abdominal: Soft. Normal appearance and bowel sounds are normal. She exhibits no  distension. There is no tenderness.   Colostomy bag in RLQ; multiple well-healed previous abdominal surgery scars.    Genitourinary:   Genitourinary Comments: Hoover inserted.    Musculoskeletal:   There is atrophy of the muscles of the bilateral lower extremities distal to the knee.    Neurological: She is alert and oriented to person, place, and time. She displays no tremor. No cranial nerve deficit. GCS eye subscore is 4. GCS verbal subscore is 3. GCS motor subscore is 6. She displays no Babinski's sign on the right side. She displays no Babinski's sign on the left side.   There is contracture of the bilateral feel and upper extremities at the elbow with rigidity of movement at those joints.    Skin: Skin is warm and dry. No bruising and no rash noted. She is not diaphoretic.   Skin is flushed around the lower face and anterior neck.        Significant Labs:   CBC:   Recent Labs   Lab 12/09/18 0528 12/10/18  0448   WBC 11.22 14.11*   HGB 8.3* 8.8*   HCT 26.9* 28.4*   * 380*     CMP:   Recent Labs   Lab 12/08/18 2007 12/09/18  0656 12/10/18  0448    143 143   K 4.5 4.8 4.6    104 104   CO2 22* 24 30*   GLU 77 103 139*   BUN 11 12 18   CREATININE 0.8 0.8 0.9   CALCIUM 8.6* 8.6* 8.9   ANIONGAP 16 15 9   EGFRNONAA >60.0 >60.0 >60.0       Significant Imaging: I have reviewed all pertinent imaging results/findings within the past 24 hours.  I have reviewed and interpreted all pertinent imaging results/findings within the past 24 hours.    Assessment/Plan:      * Fever    · Differentials here include recurrent aspiration pneumonia secondary to dysphagia, bacteremia, and UTI.  · Admitted at OSH on 11/18 for high-grade SBO with recurrent bilateral pneumonia on CT scan on 11/26 at OSH.   · Hospital course at OSH complicated by symptoms of serotonin syndrome vs NMS with fever up to 107, status post Dantrolene, and contributed to Linezolid usage partly.   · Status post multiple courses of broad spectrum  coverage including Linezolid, Cefepime, and Gentamicin; restarted on 12/4 with Vancomycin, Cefepime, and Diflucan after re-developing fevers. Urine culture at OSH positive for Candida.   · PICC line placed at OSH; unclear when inserted; will attempt to contact OSH as not clear in transfer paperwork.   · Admission labs notable for leukocytosis of 17 with 80% granulocytes, and lactic of 2.9; restarted Vanc/Zosyn on 12/6.  · HIV, RPR, RF negative. JÚNIRO pending.   · CT A/P (12/7) without evidence of abdominal infection, showed expected post-operative changes, but showed possible RLL consolidation concerning for possible aspiration pneumonia vs pneumonitis.    · ID consulted given persistent fevers without source status post multiple broad spectrum antibiotics; recommending holding all antibiotics after 12/7 and re-culturing if patient spikes fever again. Based on CT findings, have low threshold for starting Unasyn if patient decompensates.   · Febrile 12/10 to 100.7; blood cultures, UA, and CXR repeated.   · Leukocytosis returned this morning to 14. Lactate 1.2 as of 12/7.  Started Unasyn for suspected aspiration PNA based on CT A/P.   · Consulted neurology on 12/08 for mental status changes; considering serotonin syndrome as possible explanation for fever, vital signs, and mental status changes given medications at OSH more associated with that compared to NMS. Started on cyproheptadine on 12/09.          Sustained increased muscle tone           Hypernatremia    · Sodium was only 145 on admission on 11/18.   · Noted to have progressively increasing Na at OSH up to 153 prior to discharge. Could be explanation for patient's acute mental status change.   · Repeat here 154.   · Evaluated by nephrology there; treated on and off with D5NS and free water, but without consistent correction.   · Resolved after approximately 24 hours of D5W at low maintenance. 143 today.        Dysphagia    · Noted to have severe oropharyngeal  dysphagia.   · Failed 2 Modified barium swallows, failed again on 12/5; OSH recommendations were for non-oral feeding. Most recent barium swallow assessed as silent aspirator.   · She initially had J-tube after last abdominal surgery, but fell out without attempt on repair.   · GI tried to place a PEG 1 year ago; but was unable.   · IR would not want to place J-tube.   · General surgery at [facility] declined to operate secondary to complex anatomy.   · NG tube was in for bulk of hospitalization for decompression and then tube feedings, but removed for concern of nasopharyngeal erosions.   · Speech evaluated here; recommended strict NPO with aspiration precautions.   · General surgery evaluated patient afternoon of 12/7; will review CT scan findings and decide on possible PEG.   · Consulted dietary/nutrition: Tube feed recommendations placed for initiation. Tube feeds at goal, but will need to watch for refeeding syndrome. Mg and P with daily labs.        Hypothyroidism    · On Synthroid at home.   · Holding with aspiration precautions.        Atrial fibrillation    · Rate controlled with Bisoprolol at home per outside records.   · CHADVASC score of 3; on no anticoagulation.   · Will resume home Bisoprolol           Dementia without behavioral disturbance    · Baseline status unknown. Upper level spoke with daughter Terri (phone number on sticky note). She states that since 2016 her mother's mental status has been declining she used to be able to do all her ADLs until she started having her abdominal surgeries due to a volvulus.  She was placed in a nursing home in 2016. In the last 4 months she has been less verbal and presented to the OSH for a chocking episode which lead to a cardiac arrest.  · Patient was noted to be awake, alert, and conversing throughout hospital stay at OSH after resolution of possible serotonin syndrome in ICU.   · Noted to have mental status change associated with minimal verbal response  after redevelopment of fevers. CT head and LP at OSH unremarkable.   · Noted to be awake and alert, but with disordered response to questions on admission, no change today.   · Cannot resume home dementia medications with aspiration precautions.   · Neurology consulted on 12/8 for mental status changes/worsening dementia; per recommendations, ordered MRI brain with/without contrast which did not show any intracranial abnormalities   · Discussed with neurology; mentioned possibility of serotonin syndrome residual as explanation for vitals, neurological exam, and mental status changes, decided to start on cryptoheptadine 4 mg TID on 12/09 and watch for improvement.          GERD (gastroesophageal reflux disease)    · Holding home PPI with aspiration precautions.        HLD (hyperlipidemia)    · No prior lipid panel in system.   · Holding home Atorvastatin.       Essential hypertension    · Controlled.   · Holding home BP medications as she is hypotensive  · Watch BP with Cyproheptadine   · Vitals ordered Q4H.          VTE Risk Mitigation (From admission, onward)        Ordered     heparin (porcine) injection 5,000 Units  Every 12 hours      12/07/18 1400     Place sequential compression device  Until discontinued      12/06/18 1138     IP VTE LOW RISK PATIENT  Once      12/06/18 1138              Kelvin Mcadams MD  Department of Hospital Medicine   [facility]

## 2018-12-10 NOTE — PROCEDURES
DATE OF STUDY:  12/07/2018    DURATION:  1 hour and 15 minutes.    ELECTROENCEPHALOGRAM REPORT  Extended Recording    METHODOLOGY:  Electroencephalographic (EEG) is recorded with electrodes placed   according to the International 10-20 placement system.  Thirty two (32) channels   of digital signal (sampling rate of 512/sec), including T1 and T2, were   simultaneously recorded from the scalp and may include EKG, EMG, and/or eye   monitors.  Recording band pass was 0.1 to 512 Hz.  Digital video recording of   the patient is simultaneously recorded with the EEG.  The patient is instructed   to report clinical symptoms which may occur during the recording session.  EEG   and video recording are stored and archived in digital format.  Activation   procedures, which include photic stimulation, hyperventilation and instructing   patients to perform simple tasks, are done in selected patients.    The EEG is displayed on a monitor screen and can be reviewed using different   montages.  Computer-assisted analysis is employed to detect spike and   electrographic seizure activity.  The entire record is submitted for computer   analysis.  The entire recording is visually reviewed, and the times identified   by computer analysis as being spikes or seizures are reviewed again.    Compressed spectral analysis (CSA) is also performed on the activity recorded   from each individual channel.  This is displayed as a power display of   frequencies from 0 to 30 Hz over time.  The CSA is reviewed looking for   asymmetries in power between homologous areas of the scalp, then compared with   the original EEG recording.    [employer] software was also utilized in the review of this study.  This software   suite analyzes the EEG recording in multiple domains.  Coherence and rhythmicity   are computed to identify EEG sections which may contain organized seizures.    Each channel undergoes analysis to detect the presence of spike and sharp waves    which have special and morphological characteristics of epileptic activity.  The   routine EEG recording is converted from special into frequency domain.  This is   then displayed comparing homologous areas to identify areas of significant   asymmetry.  Algorithm to identify non-cortically generated artifact is used to   separate artifact from the EEG.    EEG FINDINGS:  This is a medium amplitude study, which consists of somewhat   disorganized mixed frequencies with abundant diffuse beta activity in the 20 Hz   range seen amongst theta and alpha frequencies seen scattered diffusely as well.    There is EMG artifact and there is good page to page fluctuation in the   record, although abundant theta activity is seen during wakefulness.  A   well-defined posterior dominant rhythm is not consistently appreciated, although   there are fragments of an 8 Hz posterior dominant rhythm seen at times,  Sleep   was not captured during this study and the patient was mildly agitated   throughout the entire study, which did create some artifact.    The record was symmetrical overall without focal findings.  There were no   epileptiform discharges.  There were no seizures.  There were no other   significant findings.    INTERPRETATION:  Abnormal EEG due to mild slowing and disorganization of the   waking background in this hospitalized patient.  No evidence of epilepsy or   focal cerebral dysfunction was seen, however.  Results are most consistent with   a mild encephalopathy, nonspecific as to etiology.      NBB/IN  dd: 12/07/2018 16:16:22 (CST)  td: 12/07/2018 17:08:15 (CST)  Doc ID   #[id number]  Job ID #[id number]    CC:

## 2018-12-10 NOTE — PLAN OF CARE
Problem: Patient Care Overview  Goal: Plan of Care Review  Outcome: Ongoing (interventions implemented as appropriate)  Patient disoriented x3, fever x2 overnight,team notified cultures obtained. Tylenol administered via gtube twice,mild effect noted. Tube feeding continued at goal rat of 40/ml, pt tolerating feeding. Notified team of rash to patient's neck during shift. MD to bedside to assess patient,no new orders given. Patient turned [id number]. No falls and injuries,Pt stable will continue to monitor.

## 2018-12-10 NOTE — PROGRESS NOTES
[facility]  Infectious Disease  Progress Note    Patient Name: Silvia Ornelas  MRN: [id number]  Admission Date: 12/6/2018  Length of Stay: 4 days  Attending Physician: Humberto Kingston MD  Primary Care Provider: Primary Doctor No    Isolation Status: No active isolations  Assessment/Plan:      * Fever    This is a 68 year old female who has had complicated hospitalization over the past month with empiric treatment for fevers and leukocytosis with no definitive source identified (initially some concern for aspiration pneumonia). It is unclear at this time if the patient's continued fevers are due to infectious source or if there is a non-infectious etiology.     Workup to date:   LP: 4 WBC, glucose 118, protein 24, H. Flu negative, S. Pneumoniae negative, N. Meningitidis negative  CT Head: unremarkable  Urinalysis: rare yeast, trace leuk, occasional bacteria  Repeat UA 12/10: 68 WBC, few clumps, occasional bacteria  CXR: mild opacification of RUL  Procalcitonin: 0.30  HIV: negative  CT: consolidation concerning for aspiration  TTE: no evidence of infection    Blood cultures (drawn 12/6): NGTD    RECOMMENDATIONS:     - agree with decision to start IV Unasyn, which should provide adequate coverage of UTI and aspiration pneumonia  - no need to treat candiduria at this time  - her degree of dementia puts her at risk for continued aspiration           Thank you for your consult. I will follow-up with patient. Please contact us if you have any additional questions.    Emily Christine,   Infectious Disease  [facility]    Subjective:     Principal Problem:Fever    HPI: Ms. Silvia Ornelas is a 68 year old female with severe baseline dementia, HTN, hyperlipidemia, atrial fibrillation, hypothyroidism, multiple abdominal surgeries with adhesions and SBO, and GERD who [place] [facility] [place]vers and leukocytosis without identified source. History is  obtained from chart review as patient is nonverbal.     Ms. Ornelas was recently admitted to [facility] on 11/10/18 for suspected aspiration pneumonia and treated with Vancomycin and Zosyn. She was discharged back to nursing home on 11/16. She then presented to the hospital again on 11/18/18 for nausea and vomiting. She was found to have high-grade small bowel obstruction and bilateral lower lobe lung consolidations. She was treated again for pneumonia with Linezolid and Cefepime. Her SBO was managed by hgeneral surgery with NG decompression.     On 11/22, patient was started on a five day course of Gentamicin. On 11/23, she developed WBC of 18 and fever with Tmax 101 F. On 11/27, patient developed fever of 107 F rectally, as well as new rigidity, hyperreflexia, and fine tremor with WBC 28 and lactic acid of 3. She was treated with Dantrolene for presumed neuroleptic malignant syndrome. Linezolid was discontinued. CT head was reportedly normal. Patient was then started on Vancomycin and continued Cefepime.     On 12/3, antibiotics were stopped. On 12/4, patient again had fever of 101-102 F and was rigid, diaphoretic, and had decline in mental status. Her alvarez catheter was exchanged, blood and urine cultures were repeated. An LP was performed, with a negative gram stain. Other results were still pending at time of transfer. On 12/5, Diflucan was added for finding of candida in urine. Patient was then transferred to [facility] on 12/6 for ID consultation as well as surgical evaluation for feeding tube placement.     Patient was started on IV Vancomycin and Zosyn upon arrival to [facility] and continued to have fever with Tmax 101.4. Urinalysis was performed with rare yeast, trace leukocyte esterase, negative nitrites, and occasional bacteria. CXR was obtained with mild opacification of the RUL. WBC was initially 17. Procalcitonin was 0.3, ESR was 113, and CRP was 159. Lactic acid was initially 2.4 and trended down to 1.2.    Interval History: Febrile to 100.7 F this am; started on unasyn by primary team. Patient remains nonverbal.     Review of Systems   Unable to perform ROS: Dementia     Objective:     Vital Signs (Most Recent):  Temp: 98 °F (36.7 °C) (12/10/18 1141)  Pulse: 97 (12/10/18 1141)  Resp: 18 (12/10/18 1141)  BP: 111/67 (12/10/18 1141)  SpO2: (!) 94 % (12/10/18 1141) Vital Signs (24h Range):  Temp:  [98 °F (36.7 °C)-100.7 °F (38.2 °C)] 98 °F (36.7 °C)  Pulse:  [] 97  Resp:  [16-20] 18  SpO2:  [92 %-97 %] 94 %  BP: (109-126)/(60-72) 111/67     Weight: 55.3 kg (122 lb)  Body mass index is 21.61 kg/m².    Estimated Creatinine Clearance: 50.9 mL/min (based on SCr of 0.9 mg/dL).    Physical Exam   Constitutional: No distress.   Elderly, chronically-ill appearing white female   HENT:   Head: Normocephalic and atraumatic.   Mouth/Throat: Oropharynx is clear and moist.   Eyes: Conjunctivae are normal. No scleral icterus.   Neck: Normal range of motion.   Cardiovascular: Exam reveals no friction rub.   No murmur heard.  tachycardic   Pulmonary/Chest: Effort normal and breath sounds normal. No stridor. No respiratory distress. She has no wheezes.   Abdominal: Soft. Bowel sounds are normal. She exhibits no distension. There is no tenderness.   Musculoskeletal:   Stiffness on passive ROM of arms. Resting tremor noted   Lymphadenopathy:     She has no cervical adenopathy.   Neurological: She is alert.   Awake, alert, nonverbal but tracks with eyes   Skin: Skin is warm and dry. No rash noted. She is not diaphoretic.       Significant Labs: All pertinent labs within the past 24 hours have been reviewed.    Significant Imaging: I have reviewed all pertinent imaging results/findings within the past 24 hours.

## 2018-12-10 NOTE — ASSESSMENT & PLAN NOTE
· Baseline status unknown. Upper level spoke with daughter Terri (phone number on sticky note). She states that since 2016 her mother's mental status has been declining she used to be able to do all her ADLs until she started having her abdominal surgeries due to a volvulus.  She was placed in a nursing home in 2016. In the last 4 months she has been less verbal and presented to the OSH for a chocking episode which lead to a cardiac arrest.  · Patient was noted to be awake, alert, and conversing throughout hospital stay at OSH after resolution of possible serotonin syndrome in ICU.   · Noted to have mental status change associated with minimal verbal response after redevelopment of fevers. CT head and LP at OSH unremarkable.   · Noted to be awake and alert, but with disordered response to questions on admission, no change today.   · Cannot resume home dementia medications with aspiration precautions.   · Neurology consulted on 12/8 for mental status changes/worsening dementia; per recommendations, ordered MRI brain with/without contrast which did not show any intracranial abnormalities   · Discussed with neurology; mentioned possibility of serotonin syndrome residual as explanation for vitals, neurological exam, and mental status changes, decided to start on cryptoheptadine 4 mg TID on 12/09 and watch for improvement.

## 2018-12-10 NOTE — ASSESSMENT & PLAN NOTE
· Sodium was only 145 on admission on 11/18.   · Noted to have progressively increasing Na at OSH up to 153 prior to discharge. Could be explanation for patient's acute mental status change.   · Repeat here 154.   · Evaluated by nephrology there; treated on and off with D5NS and free water, but without consistent correction.   · Resolved after approximately 24 hours of D5W at low maintenance. 143 today.

## 2018-12-10 NOTE — PROGRESS NOTES
[facility]  Neurology  Progress Note    Patient Name: Silvia Ornelas  MRN: [id number]  Admission Date: 12/6/2018  Hospital Length of Stay: 4 days  Code Status: Full Code   Attending Provider: Humberto Kingston MD  Primary Care Physician: Primary Doctor No   Principal Problem:Fever      Subjective:     Interval History: Patient seen and examined this morning. Appropriately responds to questions this AM, although the examiner must raise voice as patient is hard of hearing.   Patient alert and oriented X 2, self and place (hospital). Denies any pain today. States that she is feeling well.   Low grade fever overnight, patient found to have UA concerning for UTI.     Current Neurological Medications:   Cyproheptadine 4 mg TID    Current Facility-Administered Medications   Medication Dose Route Frequency Provider Last Rate Last Dose    acetaminophen oral solution 650 mg  650 mg Per NG tube Q4H PRN Humberto Kingston MD        ampicillin-sulbactam 3 g in sodium chloride 0.9 % 100 mL IVPB (ready to mix system)  3 g Intravenous Q6H Humberto Kingston  mL/hr at 12/10/18 1327 3 g at 12/10/18 1327    aspirin chewable tablet 81 mg  81 mg Per NG tube Daily Kelvin Mcadams MD   81 mg at 12/10/18 0958    bisoprolol split tablet 2.5 mg  2.5 mg Per NG tube Daily Kelvin Mcadams MD   2.5 mg at 12/10/18 0958    dextrose 50% injection 12.5 g  12.5 g Intravenous PRN Jeaneth Boyd MD        dextrose 50% injection 25 g  25 g Intravenous PRN Jeaneth Boyd MD        glucagon (human recombinant) injection 1 mg  1 mg Intramuscular PRN Jeanethparul Boyd MD        glucose chewable tablet 16 g  16 g Oral PRN Jeaneth O MD Oliver        glucose chewable tablet 24 g  24 g Oral PRN Jeaneth O MD Oliver        heparin (porcine) injection 5,000 Units  5,000 Units Subcutaneous Q12H Humberto Kingston MD   5,000 Units at 12/10/18 0958    ondansetron disintegrating tablet 8 mg  8 mg Oral Q8H PRN Jeaneth O  MD Oliver        sodium chloride 0.9% flush 5 mL  5 mL Intravenous PRN Jeaneth Boyd MD           Review of Systems     Unable to perform ROS: Mental status change   Constitutional: Positive for activity change, diaphoresis and fever.   HENT: Positive for trouble swallowing.    Gastrointestinal: Negative for nausea and vomiting.   Musculoskeletal:        Stiffness/rigidity much improved, although still present, concerning for some baseline rigidity   Neurological: Positive for tremors     Objective:     Vital Signs (Most Recent):  Temp: 98 °F (36.7 °C) (12/10/18 1141)  Pulse: 97 (12/10/18 1141)  Resp: 18 (12/10/18 1141)  BP: 111/67 (12/10/18 1141)  SpO2: (!) 94 % (12/10/18 1141) Vital Signs (24h Range):  Temp:  [98 °F (36.7 °C)-100.7 °F (38.2 °C)] 98 °F (36.7 °C)  Pulse:  [] 97  Resp:  [16-20] 18  SpO2:  [92 %-97 %] 94 %  BP: (109-126)/(60-72) 111/67     Weight: 55.3 kg (122 lb)  Body mass index is 21.61 kg/m².    Physical Exam  General:  Well-developed, well-nourished, nad, minimally responsive at first. Opens eyes on command. Communicates appropriately if able to hear the question   HEENT:  NCAT, PERRLA, EOMI, oropharyngeal membrane bloody, noted broken tooth, unclear the chronicity. Noted wound of the lower lip  Neck:  Supple, rigid, but able to move neck from side to side  Resp:  Symmetric expansion, no increased wob  CVS:  No LE edema, peripheral pulses 2+ (radial, dorsalis pedis)  GI:  Abd soft, non-distended, non-tender to palpation  Neurologic Exam:  Mental Status:  AAOx2.  Speech, scant, delayed, but able to answer questions appropriately.  Cranial Nerves:   PERRLA, EOMI.  Facial movement, sensation intact and symmetric.  Palate raises symmetrically, tongue protrudes midline.    Motor:  Normal bulk and increased tone, although much improved from first day of evaluation.  Moves all extremities on command, difficult to assess strength but she is antigravity throughout. Some contractures noted in b/l  hands and feet  Sensory:  Intact to light touch at all extremities and face without inattention.    Reflexes:  Biceps, brachioradialis, patellar, Achilles 2+ and symmetric.    Coordination: Fine tremor notes on arm extension.  Gait:  Deferred          Significant Labs:   Hemoglobin A1c:   Recent Labs   Lab 12/06/18  1255   HGBA1C 5.6     Blood Culture: No results for input(s): LABBLOO in the last 48 hours.  BMP:   Recent Labs   Lab 12/08/18 2007 12/09/18 0528 12/09/18 0656 12/10/18  0448   GLU 77  --  103 139*     --  143 143   K 4.5  --  4.8 4.6     --  104 104   CO2 22*  --  24 30*   BUN 11  --  12 18   CREATININE 0.8  --  0.8 0.9   CALCIUM 8.6*  --  8.6* 8.9   MG  --  2.0  --  2.4     CBC:   Recent Labs   Lab 12/09/18  0528 12/10/18  0448   WBC 11.22 14.11*   HGB 8.3* 8.8*   HCT 26.9* 28.4*   * 380*     CMP:   Recent Labs   Lab 12/08/18 2007 12/09/18 0528 12/09/18 0656 12/10/18  0448   GLU 77  --  103 139*     --  143 143   K 4.5  --  4.8 4.6     --  104 104   CO2 22*  --  24 30*   BUN 11  --  12 18   CREATININE 0.8  --  0.8 0.9   CALCIUM 8.6*  --  8.6* 8.9   MG  --  2.0  --  2.4   ANIONGAP 16  --  15 9   EGFRNONAA >60.0  --  >60.0 >60.0     CSF Culture: No results for input(s): CSFCULTURE in the last 48 hours.  CSF Studies: No results for input(s): ALIQUT, APPEARCSF, COLORCSF, CSFWBC, CSFRBC, GLUCCSF, LDHCSF, PROTEINCSF, VDRLCSF in the last 48 hours.  Inflammatory Markers: No results for input(s): SEDRATE, CRP, PROCAL in the last 48 hours.  POCT Glucose: No results for input(s): POCTGLUCOSE in the last 24 hours.  Prealbumin: No results for input(s): PREALBUMIN in the last 48 hours.  Respiratory Culture: No results for input(s): GSRESP, RESPIRATORYC in the last 48 hours.  Urine Culture: No results for input(s): LABURIN in the last 48 hours.  Urine Studies:   Recent Labs   Lab 12/10/18  0535   COLORU Yellow   APPEARANCEUA Cloudy*   PHUR 5.0   SPECGRAV 1.020   PROTEINUA 1+*    GLUCUA 1+*   KETONESU Negative   BILIRUBINUA Negative   OCCULTUA 2+*   NITRITE Negative   LEUKOCYTESUR 3+*   RBCUA 10*   WBCUA 68*   BACTERIA Occasional   SQUAMEPITHEL 12   HYALINECASTS 0     All pertinent lab results from the past 24 hours have been reviewed.    Significant Imaging: I have reviewed all pertinent imaging results/findings within the past 24 hours.   MRI  The brain parenchyma appears normal.  No mass lesion, acute hemorrhage, edema or acute infarct.  No abnormal enhancement.  Ventricles and sulci are normal in size for age without evidence of hydrocephalus.  No extra-axial blood or fluid collections.  Normal vascular flow voids are preserved.  Skull/extracranial contents (limited evaluation): Bone marrow signal intensity is normal.     EEG  Pending final read; no evidence of NCSE or other epileptiform activity/seziures upon independent review       Assessment and Plan:     * Fever    - Recurrent, low grade  - Investigations and treatment per primary      Generalized hyperreflexia    - See principal problem      Serotonin syndrome, presumed    Patient is a 69 yo female w/ past complicated medical history presents as a transfer from OSH w/ primary diagnosis of fever w/ culture negative workup, rigidity, AMS w/ decreased responsiveness, hyperreflexia. Per records, patient was given Linezolid, Zofran and Fentanyl at one point of time at the OSH and had developed rapid onset above symptoms. From the notes, patient was presumed to have NMS vs SS and the offending agents were stopped as well as patient was give Dantrolene 140 mg - patient did show improvement in her muscle rigidity, which is expected.   She later developed similar symptoms on 12/04, and although her MAR was evaluated for any other offending agents, none were found. She was transferred here on 12/06 for further work- up of the above symptoms.   Infectious work-up has been unremarkable.     Patient continues to improve, significantly so w/  administration of cyproheptadine. This is consistent w/ the thought that patient's presentation is due to resolving serotonin syndrome, as described above.  Patient does appear to have some baseline rigidity ( LE>UE), and she has been wheelchair bound for the past 2 years. Should follow up w/ Neurology on outpatient bases to further evaluate this chronic issue.   UTI management per primary team.      - Hypertonia significantly improved on today's exam   - History consistent w/ serotonin syndrome, as the above mentioned agents ( Zofran, Linezolid and Fentanyl) have all been associated with the development of this condition. Dantrolene would show improvement in patient's rigidity but would not be an appropriate treatment for SS.  - CPK ordered - WNL now; but patient has been receiving IVF etc  - No mention of any neuroleptic medication on patient's OSH records  - Cyproheptadine as the treatment modality for SS; monitor for hypotension  - has shown improvement in her tone since administration of the therapy  - EEG ordered - upon review with staff no evidence of NCSE or diffuse cortical dysfunction  - MRI w/o any abnormalities   - Neurology follow up at [facility] if location is appropriate or an external referral needed for this patient upon discharge  - Appreciate consult; will sign off at this time; please call with any additional questions.        Dementia without behavioral disturbance    - Continue home Aricept          VTE Risk Mitigation (From admission, onward)        Ordered     heparin (porcine) injection 5,000 Units  Every 12 hours      12/07/18 1400     Place sequential compression device  Until discontinued      12/06/18 1138     IP VTE LOW RISK PATIENT  Once      12/06/18 1138        Appreciate consult; will sign off at this time; please call with any additional questions.     Ivette Peterson MD  Neurology  [facility]

## 2018-12-11 LAB
ANION GAP SERPL CALC-SCNC: 11 MMOL/L
BACTERIA BLD CULT: NORMAL
BACTERIA BLD CULT: NORMAL
BASOPHILS # BLD AUTO: 0.05 K/UL
BASOPHILS NFR BLD: 0.4 %
BUN SERPL-MCNC: 19 MG/DL
CALCIUM SERPL-MCNC: 9 MG/DL
CHLORIDE SERPL-SCNC: 105 MMOL/L
CO2 SERPL-SCNC: 30 MMOL/L
CREAT SERPL-MCNC: 0.9 MG/DL
DIFFERENTIAL METHOD: ABNORMAL
EOSINOPHIL # BLD AUTO: 0.2 K/UL
EOSINOPHIL NFR BLD: 1.3 %
ERYTHROCYTE [DISTWIDTH] IN BLOOD BY AUTOMATED COUNT: 18.8 %
EST. GFR  (AFRICAN AMERICAN): >60 ML/MIN/1.73 M^2
EST. GFR  (NON AFRICAN AMERICAN): >60 ML/MIN/1.73 M^2
GLUCOSE SERPL-MCNC: 136 MG/DL
HCT VFR BLD AUTO: 29.6 %
HGB BLD-MCNC: 9 G/DL
IMM GRANULOCYTES # BLD AUTO: 0.15 K/UL
IMM GRANULOCYTES NFR BLD AUTO: 1.1 %
LYMPHOCYTES # BLD AUTO: 2.5 K/UL
LYMPHOCYTES NFR BLD: 18.3 %
MAGNESIUM SERPL-MCNC: 2 MG/DL
MCH RBC QN AUTO: 29.8 PG
MCHC RBC AUTO-ENTMCNC: 30.4 G/DL
MCV RBC AUTO: 98 FL
MONOCYTES # BLD AUTO: 1.2 K/UL
MONOCYTES NFR BLD: 8.3 %
NEUTROPHILS # BLD AUTO: 9.8 K/UL
NEUTROPHILS NFR BLD: 70.6 %
NRBC BLD-RTO: 0 /100 WBC
PHOSPHATE SERPL-MCNC: 2.3 MG/DL
PLATELET # BLD AUTO: 382 K/UL
PMV BLD AUTO: 12.7 FL
POTASSIUM SERPL-SCNC: 4.8 MMOL/L
RBC # BLD AUTO: 3.02 M/UL
SODIUM SERPL-SCNC: 146 MMOL/L
WBC # BLD AUTO: 13.9 K/UL

## 2018-12-11 PROCEDURE — 99232 SBSQ HOSP IP/OBS MODERATE 35: CPT | Mod: ,,, | Performed by: SURGERY

## 2018-12-11 PROCEDURE — 80048 BASIC METABOLIC PNL TOTAL CA: CPT

## 2018-12-11 PROCEDURE — 85025 COMPLETE CBC W/AUTO DIFF WBC: CPT

## 2018-12-11 PROCEDURE — 99232 PR SUBSEQUENT HOSPITAL CARE,LEVL II: ICD-10-PCS | Mod: GC,,, | Performed by: INTERNAL MEDICINE

## 2018-12-11 PROCEDURE — 25000003 PHARM REV CODE 250: Performed by: STUDENT IN AN ORGANIZED HEALTH CARE EDUCATION/TRAINING PROGRAM

## 2018-12-11 PROCEDURE — 63600175 PHARM REV CODE 636 W HCPCS: Performed by: STUDENT IN AN ORGANIZED HEALTH CARE EDUCATION/TRAINING PROGRAM

## 2018-12-11 PROCEDURE — 36415 COLL VENOUS BLD VENIPUNCTURE: CPT

## 2018-12-11 PROCEDURE — 99232 PR SUBSEQUENT HOSPITAL CARE,LEVL II: ICD-10-PCS | Mod: ,,, | Performed by: SURGERY

## 2018-12-11 PROCEDURE — 84100 ASSAY OF PHOSPHORUS: CPT

## 2018-12-11 PROCEDURE — 83735 ASSAY OF MAGNESIUM: CPT

## 2018-12-11 PROCEDURE — 99232 SBSQ HOSP IP/OBS MODERATE 35: CPT | Mod: GC,,, | Performed by: INTERNAL MEDICINE

## 2018-12-11 PROCEDURE — 99233 PR SUBSEQUENT HOSPITAL CARE,LEVL III: ICD-10-PCS | Mod: GC,,, | Performed by: STUDENT IN AN ORGANIZED HEALTH CARE EDUCATION/TRAINING PROGRAM

## 2018-12-11 PROCEDURE — 20600001 HC STEP DOWN PRIVATE ROOM

## 2018-12-11 PROCEDURE — 99233 SBSQ HOSP IP/OBS HIGH 50: CPT | Mod: GC,,, | Performed by: STUDENT IN AN ORGANIZED HEALTH CARE EDUCATION/TRAINING PROGRAM

## 2018-12-11 RX ORDER — SODIUM,POTASSIUM PHOSPHATES 280-250MG
2 POWDER IN PACKET (EA) ORAL ONCE
Status: COMPLETED | OUTPATIENT
Start: 2018-12-11 | End: 2018-12-11

## 2018-12-11 RX ORDER — LEVOTHYROXINE SODIUM 25 UG/1
25 TABLET ORAL DAILY
Status: DISCONTINUED | OUTPATIENT
Start: 2018-12-11 | End: 2018-12-18

## 2018-12-11 RX ORDER — DONEPEZIL HYDROCHLORIDE 5 MG/1
10 TABLET, FILM COATED ORAL NIGHTLY
Status: DISCONTINUED | OUTPATIENT
Start: 2018-12-11 | End: 2018-12-18

## 2018-12-11 RX ORDER — ACYCLOVIR 200 MG/5ML
25 SUSPENSION, ORAL (FINAL DOSE FORM) ORAL DAILY
Status: DISCONTINUED | OUTPATIENT
Start: 2018-12-11 | End: 2018-12-11 | Stop reason: ALTCHOICE

## 2018-12-11 RX ADMIN — CYPROHEPTADINE HYDROCHLORIDE 4 MG: 4 TABLET ORAL at 09:12

## 2018-12-11 RX ADMIN — POTASSIUM & SODIUM PHOSPHATES POWDER PACK 280-160-250 MG 2 PACKET: 280-160-250 PACK at 12:12

## 2018-12-11 RX ADMIN — AMPICILLIN SODIUM AND SULBACTAM SODIUM 3 G: 2; 1 INJECTION, POWDER, FOR SOLUTION INTRAMUSCULAR; INTRAVENOUS at 01:12

## 2018-12-11 RX ADMIN — AMPICILLIN SODIUM AND SULBACTAM SODIUM 3 G: 2; 1 INJECTION, POWDER, FOR SOLUTION INTRAMUSCULAR; INTRAVENOUS at 07:12

## 2018-12-11 RX ADMIN — BISOPROLOL FUMARATE 2.5 MG: 5 TABLET ORAL at 09:12

## 2018-12-11 RX ADMIN — DONEPEZIL HYDROCHLORIDE 10 MG: 5 TABLET, FILM COATED ORAL at 09:12

## 2018-12-11 RX ADMIN — ENOXAPARIN SODIUM 40 MG: 100 INJECTION SUBCUTANEOUS at 05:12

## 2018-12-11 RX ADMIN — ASPIRIN 81 MG CHEWABLE TABLET 81 MG: 81 TABLET CHEWABLE at 09:12

## 2018-12-11 RX ADMIN — LEVOTHYROXINE SODIUM 25 MCG: 25 TABLET ORAL at 01:12

## 2018-12-11 RX ADMIN — AMPICILLIN SODIUM AND SULBACTAM SODIUM 3 G: 2; 1 INJECTION, POWDER, FOR SOLUTION INTRAMUSCULAR; INTRAVENOUS at 06:12

## 2018-12-11 RX ADMIN — AMPICILLIN SODIUM AND SULBACTAM SODIUM 3 G: 2; 1 INJECTION, POWDER, FOR SOLUTION INTRAMUSCULAR; INTRAVENOUS at 02:12

## 2018-12-11 RX ADMIN — Medication 6.25 MG: at 09:12

## 2018-12-11 NOTE — ASSESSMENT & PLAN NOTE
· Sodium was only 145 on admission on 11/18.   · Noted to have progressively increasing Na at OSH up to 153 prior to discharge. Could be explanation for patient's acute mental status change.   · Repeat here 154.   · Evaluated by nephrology there; treated on and off with D5NS and free water, but without consistent correction.   · Resolved initially after approximately 24 hours of D5W at low maintenance.   · Na up to 146 today; will give free water TID via NG daily for correction.

## 2018-12-11 NOTE — ASSESSMENT & PLAN NOTE
· Differentials here include recurrent aspiration pneumonia secondary to dysphagia, bacteremia, and UTI.  · Admitted at OSH on 11/18 for high-grade SBO with recurrent bilateral pneumonia on CT scan on 11/26 at OSH.   · Hospital course at OSH complicated by symptoms of serotonin syndrome vs NMS with fever up to 107, status post Dantrolene, and contributed to Linezolid usage partly.   · Status post multiple courses of broad spectrum coverage including Linezolid, Cefepime, and Gentamicin; restarted on 12/4 with Vancomycin, Cefepime, and Diflucan after re-developing fevers. Urine culture at OSH positive for Candida.   · PICC line placed at OSH; unclear when inserted; will attempt to contact OSH as not clear in transfer paperwork.   · Admission labs notable for leukocytosis of 17 with 80% granulocytes, and lactic of 2.9; restarted Vanc/Zosyn on 12/6.  · HIV, RPR, RF negative. JÚNIOR pending.   · CT A/P (12/7) without evidence of abdominal infection, showed expected post-operative changes, but showed possible RLL consolidation concerning for possible aspiration pneumonia vs pneumonitis.    · ID consulted given persistent fevers without source status post multiple broad spectrum antibiotics; recommending holding all antibiotics after 12/7 and re-culturing if patient spikes fever again. Based on CT findings, have low threshold for starting Unasyn if patient decompensates.   · Febrile 12/10 to 100.7; blood cultures, UA, and CXR repeated. UA suspicious for possible infection with 3+ leukocytes and 68 WBC's; culture pending.   · Leukocytosis returned this morning to 14. Lactate 1.2 as of 12/7.  Day #2 of Unasyn for suspected aspiration PNA based on CT A/P.   · Consulted neurology on 12/08 for mental status changes; considering serotonin syndrome as possible explanation for fever, vital signs, and mental status changes given medications at OSH more associated with that compared to NMS. Started on cyproheptadine from 12/09-12/10.

## 2018-12-11 NOTE — PROGRESS NOTES
"[facility]  General Surgery  Progress Note    Subjective:     History of Present Illness:  Ms Ornelas is a 69 yo F transferred from [facility] for fever of unknown source requesting ID and general surgery consultations for workup of fevers and placement of PEG in a patient with extensive surgical history.    From primary team note:     She has a PMH of severe dementia (baseline status unknown), Afib (rate controlled, not on anticoagulation), HTN, HLD, GERD, and hypothyroidism.     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      History obtained form OSH paper chart review, secondary to patient's mental status.      She was hospitalized at [facility] on 11/10/18 for suspected aspiration pneumonia; status post treatment with Vanc/Zosyn. She was noted to have abnormal barium swallow at that time.   PEG tube placement again discussed with family; they declined.    Discharged back to nursing home on 11/16.      She presented again on 11/18/18 for new onset of nausea and vomiting. On presentation, labs significant for BRODY (BUN 24, Cr 2.6). CT A/P showed high-grade small bowel obstruction and bilateral lower lobe consolidations. She was started on Linezolid and Cefepime for suspected bilateral aspiration pneumonia. Her SBO was managed by general surgery with conservative measures, including NG decompression, pain medications, and IVF.  She then began developing leukocytosis to 18, with new fevers (Tmax 101) " on 11/23; 5 day course of Gentamicin was added on 11/22.     On 11/26, she was noted to be passing gas and having bowel movements, but continued to have high NG bilious outptut. Repeat CT showed resolving SBO, but continued bilateral lower lobe consolidations. On 11/27, she spiked a fever to 107 (rectal probe) and developed new onset of extremity rigidity, hyperreflexia, and fine tremor, associated with worsening leukocytosis to 28, and lactic of 3. She was assessed to have serotonin syndrome secondary vs NMS due to possible combination of Linezolid, Dilaudid, Fentanyl, and Zofran. She received 140 mg of Dantrolene for possible NMS with symptom improvement; Linezolid discontinued. Patient's antibiotics switched to vancomycin and cefepime for aspiration pneumonitis, WBC was downtrending, and both antibiotics stopped on 12/3 after completed of 6-7 day antibiotic course.  Patient noted to be alert, conversant and oriented to self and with WBC downtrending, antibiotics stopped. Step down from ICU.     Overnight on 12/4, she had resumption of fevers (Tmax 101-102) with again features again of rigidity. Mental status change declined again with minimal  verbal response; patient noted to be ill appearing and diaphoretic.  Medications reviewed with pharmacy to ensure no active medications might be precipitating Serotonin Syndrome. CT head was unremarkable. Blood and urine cultures repeated. Hoover catheter exchanged. LP performed; gram stain negative, but other analyses pending. In discussion of case, decision made to watch patient overnight and re-eval on 12/5. On 12/5, her Vancomycin and Cefepime restarted, also on Diflucan for urine culture positive for Candida.      Additionally, she was noted to have severe oropharyngeal dysphagia. She failed 2 Modified barium swallows, failed again on 12/5; OSH recommendations were for non-oral feeding. She initially had J-tube after last abdominal surgery, but fell out without attempt  on repair. GI tried to place a PEG 1 year ago; but was unable.  IR would not want to place J-tube. General surgery at [facility] declined to operate secondary to complex anatomy.      NG tube was in for bulk of hospitalization for decompression and then tube feedings, but removed for concern of nasopharyngeal erosions.           Post-Op Info:  * No surgery found *         Interval History:     No acute events overnight. Patient tolerating TF at 40 cc/h. Afebrile this past  24 hours.     Medications:  Continuous Infusions:  Scheduled Meds:   ampicillin-sulbactim (UNASYN) IVPB  3 g Intravenous Q6H    aspirin  81 mg Per NG tube Daily    bisoprolol  2.5 mg Per NG tube Daily    cyproheptadine  4 mg Per NG tube TID    enoxaparin  40 mg Subcutaneous Daily     PRN Meds:acetaminophen, dextrose 50%, dextrose 50%, glucagon (human recombinant), glucose, glucose, ondansetron, sodium chloride 0.9%     Review of patient's allergies indicates:   Allergen Reactions    Sulfa (sulfonamide antibiotics) Other (See Comments)     Severe reaction type unknown      Objective:     Vital Signs (Most Recent):  Temp: 99.3 °F (37.4 °C) (12/11/18 0742)  Pulse: (!) 113 (12/11/18 0800)  Resp: 16 (12/11/18 0742)  BP: 117/75 (12/11/18 0742)  SpO2: 95 % (12/11/18 0742) Vital Signs (24h Range):  Temp:  [98 °F (36.7 °C)-99.6 °F (37.6 °C)] 99.3 °F (37.4 °C)  Pulse:  [] 113  Resp:  [16-20] 16  SpO2:  [93 %-96 %] 95 %  BP: (103-124)/(62-75) 117/75     Weight: 55.3 kg (122 lb)  Body mass index is 21.61 kg/m².    Intake/Output - Last 3 Shifts       12/09 0700 - 12/10 0659 12/10 0700 - 12/11 0659 12/11 0700 - 12/12 0659    P.O. 0      NG/ 680     Total Intake(mL/kg) 720 (13) 680 (12.3)     Urine (mL/kg/hr)  850 (0.6)     Stool 200 300     Total Output 200 1150     Net +520 -470            Urine Occurrence 700 x 1 x     Stool Occurrence 1 x            Physical Exam     General: Severely Demented  CV: RRR  Pulm: On NC  Abd: soft, non distended,  non tender. Midline laparotomy scar. G and J tube scars. LLQ ileostomy and LUQ mucous fistula?  Ext: wwp    Significant Labs:  CBC:   Recent Labs   Lab 12/11/18  0436   WBC 13.90*   RBC 3.02*   HGB 9.0*   HCT 29.6*   *   MCV 98   MCH 29.8   MCHC 30.4*     CMP:   Recent Labs   Lab 12/06/18  1255  12/11/18  0436   GLU 97   < > 136*   CALCIUM 9.6   < > 9.0   ALBUMIN 2.9*  --   --    PROT 7.5  --   --    *   < > 146*   K 3.6   < > 4.8   CO2 24   < > 30*   *   < > 105   BUN 17   < > 19   CREATININE 0.8   < > 0.9   ALKPHOS 104  --   --    ALT 30  --   --    AST 20  --   --    BILITOT 0.4  --   --     < > = values in this interval not displayed.           Assessment/Plan:     Dysphagia    Ms Ornelas is a 69 yo F transferred from [facility] for fever of unknown source requesting ID and general surgery consultations for workup of fevers and placement of PEG in a patient with extensive surgical history.    - Yesterday spoke to daughter who agrees with open Gastrostomy tube placement.   - Patient more stable from fevers but still slightly tachycardic this morning. We will plan for Open Gastrostomy tube on Thursday for now.   - Will call Daughter later today to obtain consent.   - Thank you for the consult, please call if any questions, thank you.             Delia Hernandez MD  General Surgery PGY V  Beeper: [phone]

## 2018-12-11 NOTE — SUBJECTIVE & OBJECTIVE
Interval History: No fevers since yesterday morning. Neurology recommending continued treatment for serotonin syndrome with cyproheptadine. Cultures NGTD.     Review of Systems   Unable to perform ROS: Dementia     Objective:     Vital Signs (Most Recent):  Temp: 99.3 °F (37.4 °C) (12/11/18 0742)  Pulse: (!) 113 (12/11/18 0800)  Resp: 16 (12/11/18 0742)  BP: 117/75 (12/11/18 0742)  SpO2: 95 % (12/11/18 0742) Vital Signs (24h Range):  Temp:  [98 °F (36.7 °C)-99.6 °F (37.6 °C)] 99.3 °F (37.4 °C)  Pulse:  [] 113  Resp:  [16-20] 16  SpO2:  [93 %-96 %] 95 %  BP: (103-124)/(62-75) 117/75     Weight: 55.3 kg (122 lb)  Body mass index is 21.61 kg/m².    Estimated Creatinine Clearance: 50.9 mL/min (based on SCr of 0.9 mg/dL).    Physical Exam   Constitutional: No distress.   Elderly, chronically-ill appearing white female   HENT:   Head: Normocephalic and atraumatic.   Mouth/Throat: Oropharynx is clear and moist.   Eyes: Conjunctivae are normal. No scleral icterus.   Neck: Normal range of motion.   Cardiovascular: Regular rhythm. Exam reveals no friction rub.   No murmur heard.  tachycardic   Pulmonary/Chest: Effort normal and breath sounds normal. No stridor. No respiratory distress. She has no wheezes.   Abdominal: Soft. Bowel sounds are normal. She exhibits no distension. There is no tenderness.   Lymphadenopathy:     She has no cervical adenopathy.   Neurological: She is alert.   Awake, alert, nonverbal but tracks with eyes   Skin: Skin is warm and dry. No rash noted. She is not diaphoretic.       Significant Labs:   CBC:   Recent Labs   Lab 12/10/18  0448 12/11/18  0436   WBC 14.11* 13.90*   HGB 8.8* 9.0*   HCT 28.4* 29.6*   * 382*     CMP:   Recent Labs   Lab 12/10/18  0448 12/11/18  0436    146*   K 4.6 4.8    105   CO2 30* 30*   * 136*   BUN 18 19   CREATININE 0.9 0.9   CALCIUM 8.9 9.0   ANIONGAP 9 11   EGFRNONAA >60.0 >60.0       Significant Imaging: I have reviewed all pertinent  imaging results/findings within the past 24 hours.

## 2018-12-11 NOTE — PROGRESS NOTES
"  [facility]  Adult Nutrition  Consult Note    SUMMARY     Recommendations    1. Continue current TF regimen of Isosource 1.5 @ 40 mL/hr.    - Hold for residuals >400 mL.  2. RD to monitor & follow-up.    Goals: Meet % EEN, EPN  Nutrition Goal Status: goal met  Communication of RD Recs: reviewed with RN    Reason for Assessment    Reason For Assessment: RD follow-up  Diagnosis: other (see comments)(Fever)  Relevant Medical History: HTN, HLD, Dementia, Multiple abdominal surgeries, failed PEG tube placement  Interdisciplinary Rounds: attended    General Information Comments: Pt remains NPO, per ST recommendations. Tolerating current TF regimen via NGT. Possible PEG placement 12/13, per MD notes. Pt aphasic.  Nutrition Discharge Planning: Adequate nutrition    Nutrition/Diet History    Patient Reported Diet/Restrictions/Preferences: other (see comments)(YUN)  Factors Affecting Nutritional Intake: NPO    Anthropometrics    Temp: 100.3 °F (37.9 °C)  Height: 5' 3" (160 cm)  Height (inches): 63 in  Weight Method: Bed Scale  Weight: 55.3 kg (121 lb 14.6 oz)  Weight (lb): 121.92 lb  Ideal Body Weight (IBW), Female: 115 lb  % Ideal Body Weight, Female (lb): 106.02 lb  BMI (Calculated): 21.6  (RETIRED) BMI Grade: 18.5-24.9 - normal  Usual Body Weight (UBW), kg: (YUN)    Lab/Procedures/Meds    Pertinent Labs Reviewed: reviewed  Pertinent Labs Comments: Na 146, Gluc 136  Pertinent Medications Reviewed: reviewed  Pertinent Medications Comments: D5    Physical Findings/Assessment    (RETIRED) Overall Physical Appearance: listlessness, underweight, weak  (RETIRED) Tubes: other (see comments)(Illeostomy)  RETIRED Oral/Mouth Cavity: WDL  (RETIRED) Skin: intact    Estimated/Assessed Needs    Weight Used For Calorie Calculations: 55.3 kg (121 lb 14.6 oz)     Energy Calorie Requirements (kcal): 1328 kcal/d  Energy Need Method: [place]- Jeor(1.25 PAL)     Protein Requirements: 67-73 g/d (1.2-1.3 g/kg)  Weight " Used For Protein Calculations: 55.7 kg (122 lb 12.7 oz)     Estimated Fluid Requirement Method: other (see comments)(Per MD or 1 mL/kcal)     Nutrition Prescription Ordered    Current Diet Order: NPO  Current Nutrition Support Formula Ordered: Isosource 1.5  Current Nutrition Support Rate Ordered: 40 mL/hr    Evaluation of Received Nutrient/Fluid Intake    Enteral Calories (kcal): 1440  Enteral Protein (gm): 65  Enteral (Free Water) Fluid (mL): 733    Free Water Flush Fluid (mL): 800    Energy Calories Required: meeting needs(108%)  Protein Required: meeting needs(97%)    Fluid Required: meeting needs    Comments: LBM: 12/11    Tolerance: tolerating    Nutrition Risk    Level of Risk/Frequency of Follow-up: moderate     Assessment and Plan    Nutrition Problem  Inadequate energy intake     Related to (etiology):   Inability to consume sufficient energy     Signs and Symptoms (as evidenced by):   NPO with no alternate means of nutrition      Nutrition Diagnosis Status:   Resolved     Monitor and Evaluation    Food and Nutrient Intake: enteral nutrition intake  Food and Nutrient Adminstration: enteral and parenteral nutrition administration  Physical Activity and Function: nutrition-related ADLs and IADLs  Anthropometric Measurements: weight, weight change  Biochemical Data, Medical Tests and Procedures: lipid profile, glucose/endocrine profile, inflammatory profile, gastrointestinal profile, electrolyte and renal panel  Nutrition-Focused Physical Findings: overall appearance     Nutrition Follow-Up    RD Follow-up?: Yes

## 2018-12-11 NOTE — ASSESSMENT & PLAN NOTE
This is a 68 year old female who has had complicated hospitalization over the past month with empiric treatment for fevers and leukocytosis with no definitive source identified (initially some concern for aspiration pneumonia). It is unclear at this time if the patient's continued fevers are due to infectious source or if there is a non-infectious etiology.     Workup to date:   LP: 4 WBC, glucose 118, protein 24, H. Flu negative, S. Pneumoniae negative, N. Meningitidis negative  CT Head: unremarkable  Urinalysis: rare yeast, trace leuk, occasional bacteria  Repeat UA 12/10: 68 WBC, few clumps, occasional bacteria  CXR: mild opacification of RUL  Procalcitonin: 0.30  HIV: negative  CT: consolidation concerning for aspiration  TTE: no evidence of infection    Blood cultures (drawn 12/6): NGTD  Urine culture: NGTD    RECOMMENDATIONS:     - continue Unasyn for a total of 7 days of treatment (end date 12/16), may switch to augmentin when patient has enteric access  - no need to treat candiduria at this time  - her degree of dementia puts her at risk for continued aspiration  - we will sign off

## 2018-12-11 NOTE — PROGRESS NOTES
[facility]  Infectious Disease  Progress Note    Patient Name: Silvia Ornelas  MRN: [id number]  Admission Date: 12/6/2018  Length of Stay: 5 days  Attending Physician: Madisno Barron MD  Primary Care Provider: Primary Doctor No    Isolation Status: No active isolations  Assessment/Plan:      * Fever    This is a 68 year old female who has had complicated hospitalization over the past month with empiric treatment for fevers and leukocytosis with no definitive source identified (initially some concern for aspiration pneumonia). It is unclear at this time if the patient's continued fevers are due to infectious source or if there is a non-infectious etiology.     Workup to date:   LP: 4 WBC, glucose 118, protein 24, H. Flu negative, S. Pneumoniae negative, N. Meningitidis negative  CT Head: unremarkable  Urinalysis: rare yeast, trace leuk, occasional bacteria  Repeat UA 12/10: 68 WBC, few clumps, occasional bacteria  CXR: mild opacification of RUL  Procalcitonin: 0.30  HIV: negative  CT: consolidation concerning for aspiration  TTE: no evidence of infection    Blood cultures (drawn 12/6): NGTD  Urine culture: NGTD    RECOMMENDATIONS:     - continue Unasyn for a total of 7 days of treatment (end date 12/16), may switch to augmentin when patient has enteric access  - no need to treat candiduria at this time  - her degree of dementia puts her at risk for continued aspiration  - we will sign off             Thank you for your consult. I will sign off. Please contact us if you have any additional questions.    Emily Christine DO  Infectious Disease  [facility]    Subjective:     Principal Problem:Fever    HPI: Ms. Silvia Ornelas is a 68 year old female with severe baseline dementia, HTN, hyperlipidemia, atrial fibrillation, hypothyroidism, multiple abdominal surgeries with adhesions and SBO, and GERD who was transferred to [facility] for evaluation of persistent fevers and  leukocytosis without identified source. History is obtained from chart review as patient is nonverbal.     Ms. Ornelas was recently admitted to [facility] on 11/10/18 for suspected aspiration pneumonia and treated with Vancomycin and Zosyn. She was discharged back to nursing home on 11/16. She then presented to the hospital again on 11/18/18 for nausea and vomiting. She was found to have high-grade small bowel obstruction and bilateral lower lobe lung consolidations. She was treated again for pneumonia with Linezolid and Cefepime. Her SBO was managed by hgeneral surgery with NG decompression.     On 11/22, patient was started on a five day course of Gentamicin. On 11/23, she developed WBC of 18 and fever with Tmax 101 F. On 11/27, patient developed fever of 107 F rectally, as well as new rigidity, hyperreflexia, and fine tremor with WBC 28 and lactic acid of 3. She was treated with Dantrolene for presumed neuroleptic malignant syndrome. Linezolid was discontinued. CT head was reportedly normal. Patient was then started on Vancomycin and continued Cefepime.     On 12/3, antibiotics were stopped. On 12/4, patient again had fever of 101-102 F and was rigid, diaphoretic, and had decline in mental status. Her alvarez catheter was exchanged, blood and urine cultures were repeated. An LP was performed, with a negative gram stain. Other results were still pending at time of transfer. On 12/5, Diflucan was added for finding of candida in urine. Patient was then transferred to [facility] on 12/6 for ID consultation as well as surgical evaluation for feeding tube placement.     Patient was started on IV Vancomycin and Zosyn upon arrival to [facility] and continued to have fever with Tmax 101.4. Urinalysis was performed with rare yeast, trace leukocyte esterase, negative nitrites, and occasional bacteria. CXR was obtained with mild opacification of the RUL. WBC was initially 17. Procalcitonin was 0.3, ESR was 113, and CRP was 159. Lactic  acid was initially 2.4 and trended down to 1.2.   Interval History: No fevers since yesterday morning. Neurology recommending continued treatment for serotonin syndrome with cyproheptadine. Cultures NGTD.     Review of Systems   Unable to perform ROS: Dementia     Objective:     Vital Signs (Most Recent):  Temp: 99.3 °F (37.4 °C) (12/11/18 0742)  Pulse: (!) 113 (12/11/18 0800)  Resp: 16 (12/11/18 0742)  BP: 117/75 (12/11/18 0742)  SpO2: 95 % (12/11/18 0742) Vital Signs (24h Range):  Temp:  [98 °F (36.7 °C)-99.6 °F (37.6 °C)] 99.3 °F (37.4 °C)  Pulse:  [] 113  Resp:  [16-20] 16  SpO2:  [93 %-96 %] 95 %  BP: (103-124)/(62-75) 117/75     Weight: 55.3 kg (122 lb)  Body mass index is 21.61 kg/m².    Estimated Creatinine Clearance: 50.9 mL/min (based on SCr of 0.9 mg/dL).    Physical Exam   Constitutional: No distress.   Elderly, chronically-ill appearing white female   HENT:   Head: Normocephalic and atraumatic.   Mouth/Throat: Oropharynx is clear and moist.   Eyes: Conjunctivae are normal. No scleral icterus.   Neck: Normal range of motion.   Cardiovascular: Regular rhythm. Exam reveals no friction rub.   No murmur heard.  tachycardic   Pulmonary/Chest: Effort normal and breath sounds normal. No stridor. No respiratory distress. She has no wheezes.   Abdominal: Soft. Bowel sounds are normal. She exhibits no distension. There is no tenderness.   Lymphadenopathy:     She has no cervical adenopathy.   Neurological: She is alert.   Awake, alert, nonverbal but tracks with eyes   Skin: Skin is warm and dry. No rash noted. She is not diaphoretic.       Significant Labs:   CBC:   Recent Labs   Lab 12/10/18  0448 12/11/18  0436   WBC 14.11* 13.90*   HGB 8.8* 9.0*   HCT 28.4* 29.6*   * 382*     CMP:   Recent Labs   Lab 12/10/18  0448 12/11/18  0436    146*   K 4.6 4.8    105   CO2 30* 30*   * 136*   BUN 18 19   CREATININE 0.9 0.9   CALCIUM 8.9 9.0   ANIONGAP 9 11   EGFRNONAA >60.0 >60.0        Significant Imaging: I have reviewed all pertinent imaging results/findings within the past 24 hours.

## 2018-12-11 NOTE — ASSESSMENT & PLAN NOTE
· Baseline status unknown. Upper level spoke with daughter Terri (phone number on sticky note). She states that since 2016 her mother's mental status has been declining she used to be able to do all her ADLs until she started having her abdominal surgeries due to a volvulus.  She was placed in a nursing home in 2016. In the last 4 months she has been less verbal and presented to the OSH for a chocking episode which lead to a cardiac arrest.  · Patient was noted to be awake, alert, and conversing throughout hospital stay at OSH after resolution of possible serotonin syndrome in ICU.   · Noted to have mental status change associated with minimal verbal response after redevelopment of fevers. CT head and LP at OSH unremarkable.   · Noted to be awake and alert, but with disordered response to questions on admission, no change today.   · Cannot resume home dementia medications with aspiration precautions.   · Neurology consulted on 12/8 for mental status changes/worsening dementia; per recommendations, ordered MRI brain with/without contrast which did not show any intracranial abnormalities   · Discussed with neurology; mentioned possibility of serotonin syndrome residual as explanation for vitals, neurological exam, and mental status changes, decided to start on cryptoheptadine 4 mg TID on 12/09 with minimal improvement. Cyproheptadine discontinued on 12/11 due to reported increased skin flushing and temperature spikes after dose.   · Will ask neurology about continuing dementia medications in setting of mental status changes.

## 2018-12-11 NOTE — PT/OT/SLP PROGRESS
Speech Language Pathology      Silvia Ornelas  MRN: [id number]    Patient appropriate for discharge from  at this time. Patient with MBSS on 12/4/18 indicating silent aspiration of all liquid and pudding consistencies (results of MBSS found on paper chart from OSH). A bedside swallow eval would be ineffective in fully assessing swallow as she did not present with any outward signs of aspiration during MBSS so silent aspiration could not be ruled out at bedside. An additional MBSS would be appropriate before initiating PO trials. Patient scheduled for G-tube placement on 12/13/18 per MD and  will be re-consulted following procedure to complete MBSS. SLP discussed with MD team and they were in agreement.     Lorenzo Malloy, CF-SLP  Speech-Language Pathology  Pager: [phone]

## 2018-12-11 NOTE — PLAN OF CARE
Problem: Patient Care Overview  Goal: Plan of Care Review  Outcome: Ongoing (interventions implemented as appropriate)  VSS. Max temp 100.3. Possible G tube placement Thursday. TF at 40mL/hr. No residuals. Safety maintained. Will continue to monitor.

## 2018-12-11 NOTE — SUBJECTIVE & OBJECTIVE
Interval History:     No acute events overnight. Patient tolerating TF at 40 cc/h. Afebrile this past  24 hours.     Medications:  Continuous Infusions:  Scheduled Meds:   ampicillin-sulbactim (UNASYN) IVPB  3 g Intravenous Q6H    aspirin  81 mg Per NG tube Daily    bisoprolol  2.5 mg Per NG tube Daily    cyproheptadine  4 mg Per NG tube TID    enoxaparin  40 mg Subcutaneous Daily     PRN Meds:acetaminophen, dextrose 50%, dextrose 50%, glucagon (human recombinant), glucose, glucose, ondansetron, sodium chloride 0.9%     Review of patient's allergies indicates:   Allergen Reactions    Sulfa (sulfonamide antibiotics) Other (See Comments)     Severe reaction type unknown      Objective:     Vital Signs (Most Recent):  Temp: 99.3 °F (37.4 °C) (12/11/18 0742)  Pulse: (!) 113 (12/11/18 0800)  Resp: 16 (12/11/18 0742)  BP: 117/75 (12/11/18 0742)  SpO2: 95 % (12/11/18 0742) Vital Signs (24h Range):  Temp:  [98 °F (36.7 °C)-99.6 °F (37.6 °C)] 99.3 °F (37.4 °C)  Pulse:  [] 113  Resp:  [16-20] 16  SpO2:  [93 %-96 %] 95 %  BP: (103-124)/(62-75) 117/75     Weight: 55.3 kg (122 lb)  Body mass index is 21.61 kg/m².    Intake/Output - Last 3 Shifts       12/09 0700 - 12/10 0659 12/10 0700 - 12/11 0659 12/11 0700 - 12/12 0659    P.O. 0      NG/ 680     Total Intake(mL/kg) 720 (13) 680 (12.3)     Urine (mL/kg/hr)  850 (0.6)     Stool 200 300     Total Output 200 1150     Net +520 -470            Urine Occurrence 700 x 1 x     Stool Occurrence 1 x            Physical Exam     General: Severely Demented  CV: RRR  Pulm: On NC  Abd: soft, non distended, non tender. Midline laparotomy scar. G and J tube scars. LLQ ileostomy and LUQ mucous fistula?  Ext: wwp    Significant Labs:  CBC:   Recent Labs   Lab 12/11/18  0436   WBC 13.90*   RBC 3.02*   HGB 9.0*   HCT 29.6*   *   MCV 98   MCH 29.8   MCHC 30.4*     CMP:   Recent Labs   Lab 12/06/18  1255  12/11/18  0436   GLU 97   < > 136*   CALCIUM 9.6   < > 9.0   ALBUMIN  2.9*  --   --    PROT 7.5  --   --    *   < > 146*   K 3.6   < > 4.8   CO2 24   < > 30*   *   < > 105   BUN 17   < > 19   CREATININE 0.8   < > 0.9   ALKPHOS 104  --   --    ALT 30  --   --    AST 20  --   --    BILITOT 0.4  --   --     < > = values in this interval not displayed.

## 2018-12-11 NOTE — SUBJECTIVE & OBJECTIVE
Interval History: Seen at bedside this AM. Patient appears more awake and alert after starting tube feeds. But remains non-verbal. Eyes tract movement well and she follows commands.     Review of Systems   Unable to perform ROS: Patient nonverbal     Objective:     Vital Signs (Most Recent):  Temp: 99.3 °F (37.4 °C) (12/11/18 0742)  Pulse: (!) 113 (12/11/18 0800)  Resp: 16 (12/11/18 0742)  BP: 117/75 (12/11/18 0742)  SpO2: 95 % (12/11/18 0742) Vital Signs (24h Range):  Temp:  [98 °F (36.7 °C)-99.6 °F (37.6 °C)] 99.3 °F (37.4 °C)  Pulse:  [] 113  Resp:  [16-20] 16  SpO2:  [93 %-96 %] 95 %  BP: (103-124)/(62-75) 117/75     Weight: 55.3 kg (122 lb)  Body mass index is 21.61 kg/m².    Intake/Output Summary (Last 24 hours) at 12/11/2018 1111  Last data filed at 12/11/2018 0500  Gross per 24 hour   Intake 680 ml   Output 1150 ml   Net -470 ml      Physical Exam   Constitutional: She is oriented to person, place, and time. She appears well-developed and well-nourished. No distress.   HENT:   Head: Normocephalic and atraumatic.   Mouth/Throat: Uvula is midline, oropharynx is clear and moist and mucous membranes are normal. Abnormal dentition. No dental abscesses. No tonsillar exudate.   Eyes: Conjunctivae are normal. Pupils are equal, round, and reactive to light. No scleral icterus.   Neck: Neck supple. No JVD present.   Cardiovascular: Regular rhythm, normal heart sounds and normal pulses. Tachycardia present.   No murmur heard.  Pulmonary/Chest: Effort normal and breath sounds normal. No respiratory distress. She has no decreased breath sounds. She has no rales.   Abdominal: Soft. Normal appearance and bowel sounds are normal. She exhibits no distension. There is no tenderness.   Colostomy bag in RLQ; multiple well-healed previous abdominal surgery scars.    Genitourinary:   Genitourinary Comments: Hoover inserted.    Musculoskeletal:   There is atrophy of the muscles of the bilateral lower extremities distal to the  knee.    Neurological: She is alert and oriented to person, place, and time. She displays no tremor. No cranial nerve deficit. GCS eye subscore is 4. GCS verbal subscore is 3. GCS motor subscore is 6. She displays no Babinski's sign on the right side. She displays no Babinski's sign on the left side.   There is contracture of the bilateral feel and upper extremities at the elbow with rigidity of movement at those joints.    Skin: Skin is warm and dry. No bruising and no rash noted. She is not diaphoretic.   There is reduced flushing around the lower face and anterior neck.        Significant Labs:   CBC:   Recent Labs   Lab 12/10/18  0448 12/11/18  0436   WBC 14.11* 13.90*   HGB 8.8* 9.0*   HCT 28.4* 29.6*   * 382*     CMP:   Recent Labs   Lab 12/10/18  0448 12/11/18  0436    146*   K 4.6 4.8    105   CO2 30* 30*   * 136*   BUN 18 19   CREATININE 0.9 0.9   CALCIUM 8.9 9.0   ANIONGAP 9 11   EGFRNONAA >60.0 >60.0       Significant Imaging: I have reviewed all pertinent imaging results/findings within the past 24 hours.  I have reviewed and interpreted all pertinent imaging results/findings within the past 24 hours.

## 2018-12-11 NOTE — ASSESSMENT & PLAN NOTE
· Noted to have severe oropharyngeal dysphagia.   · Failed 2 Modified barium swallows, failed again on 12/5; OSH recommendations were for non-oral feeding. Most recent barium swallow assessed as silent aspirator.   · She initially had J-tube after last abdominal surgery, but fell out without attempt on repair.   · GI tried to place a PEG 1 year ago; but was unable.   · IR would not want to place J-tube.   · General surgery at [facility] declined to operate secondary to complex anatomy.   · NG tube was in for bulk of hospitalization for decompression and then tube feedings, but removed for concern of nasopharyngeal erosions.   · Speech evaluated here; recommended strict NPO with aspiration precautions.   · General surgery evaluated patient afternoon of 12/7; after discussion with family will proceed with open PEG tube placement on 12/13.   · Consulted dietary/nutrition: Tube feed recommendations placed for initiation. Tube feeds at goal, but will need to watch for refeeding syndrome. Mg and P with daily labs.

## 2018-12-11 NOTE — PROGRESS NOTES
"[facility] Hospital Medicine  Progress Note    Patient Name: Silvia Ornelas  MRN: [id number]  Patient Class: IP- Inpatient   Admission Date: 12/6/2018  Length of Stay: 5 days  Attending Physician: Madison Barron MD  Primary Care Provider: [facility] Medicine Team: [facility] HOSP MED 5 Kelvin Mcadams MD    Subjective:     Principal Problem:Fever    HPI:  This is a 68 year old female [place] from [place] for fever, requesting ID and general surgery consultations. She has a PMH of severe dementia (baseline status unknown), Afib (rate controlled, not on anticoagulation), HTN, HLD, GERD, and hypothyroidism. 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     History obtained form [facility] paper chart review, secondary to patient's mental status.     She was hospitalized at [place] on 11/10/18 for suspected aspiration pneumonia; status post treatment with Vanc/Zosyn. She was noted to have abnormal barium swallow at that time; PEG tube placement again discussed with family; they declined. Discharged back to nursing home on 11/16.     She presented again on 11/18/18 for new onset of nausea and vomiting. On presentation, labs significant for BRODY (BUN 24, Cr 2.6). CT A/P showed high-grade small bowel obstruction and bilateral lower lobe consolidations. She was started on Linezolid and Cefepime for suspected bilateral aspiration pneumonia. Her SBO was managed by general surgery with conservative measures, " including NG decompression, pain medications, and IVF.  She then began developing leukocytosis to 18, with new fevers (Tmax 101) on 11/23; 5 day course of Gentamicin was added on 11/22. On 11/26, she was noted to be passing gas and having bowel movements, but continued to have high NG bilious outptut. Repeat CT showed resolving SBO, but continued bilateral lower lobe consolidations. On 11/27, she spiked a fever to 107 (rectal probe) and developed new onset of extremity rigidity, hyperreflexia, and fine tremor, associated with worsening leukocytosis to 28, and lactic of 3. She was assessed to have serotonin syndrome secondary vs NMS due to possible combination of Linezolid, Dilaudid, Fentanyl, and Zofran. She received 140 mg of Dantrolene for possible NMS with symptom improvement; Linezolid discontinued. Patient's antibiotics switched to vancomycin and cefepime for aspiration pneumonitis, WBC was downtrending, and both antibiotics stopped on 12/3 after completed of 6-7 day antibiotic course.  Patient noted to be alert, conversant and oriented to self and with WBC downtrending, antibiotics stopped. Step down from ICU.    Overnight on 12/4, she had resumption of fevers (Tmax 101-102) with again features again of rigidity. Mental status change declined again with minimal  verbal response; patient noted to be ill appearing and diaphoretic.  Medications reviewed with pharmacy to ensure no active medications might be precipitating Serotonin Syndrome. CT head was unremarkable. Blood and urine cultures repeated. Hoover catheter exchanged. LP performed; gram stain negative, but other analyses pending. In discussion of case, decision made to watch patient overnight and re-eval on 12/5. On 12/5, her Vancomycin and Cefepime restarted, also on Diflucan for urine culture positive for Candida.     Additionally, she was noted to have severe oropharyngeal dysphagia. She failed 2 Modified barium swallows, failed again on 12/5; OSH  recommendations were for non-oral feeding. She initially had J-tube after last abdominal surgery, but fell out without attempt on repair. GI tried to place a PEG 1 year ago; but was unable.  IR would not want to place J-tube. General surgery at [facility] declined to operate secondary to complex anatomy.  NG tube was in for bulk of hospitalization for decompression and then tube feedings, but removed for concern of nasopharyngeal erosions.      Hospital Course:  12/6: Admission labs notable for leukocytosis of 17, lactic of 2.4, sodium of 154. Restarted on Vanc/Zosyn. ICU consulted for hypernatremia management; recommended D5W correction based on free water deficit. Started on 14 hours of D5W at 70 mL/hr.   12/7: Overnight, patient noted to be tachycardic to 120, with temperature spike to 101.4. Unable to give Tylenol because of NPO/aspiratioin precautions; defervescence without intervention. Leukocytosis down to 13, lactic down to 1.2. Sodium improved to 151; restarted on D5W 70 mL for 10 hours. Consulted general surgery and ID for further recommendations. Per ID, discontinued all antibiotics. Ordered HIV, RPR, EEG, JÚNIOR, and RF. Ordered CT A/P: no abscess, expected post-operative anatomy changes, but showing RLL consolidation possibly aspiration pneumonia. Patient remains awake, alert, but with disordered and delayed response to questioning.   12/8: No events overnight. Afebrile. Tachycardia improved to lower 100's. Leukocytosis resolved (WBC 10). Sodium normalized to 145. NG restarted with tube feeds and some home medications. Pending surgery decision on possible PEG after they review CT findings. Consulted neurology because of concern of mental status changes reported by family over hospital course. Ordered MRI with/without contrast. Considering serotonin syndrome as possible explanation for fevers, tachycardia, mental status, and contractures on exam.   12/9: No events overnight. Afebrile. No leukocytosis. Still  borderline tachycardia. MRI unremarkable. Started on Cyproheptadine.   12/10:  Febrile overnight to 100.7. Leukocytosis returned to 14. Blood and urine CX repeated. CXR without signs of infection. Started Unasyn, based on RLL consolidation on CT A/P.   12/11: No events overnight. Afebrile, with leukocytosis unchanged (14). Day #2 of Unasyn. UA from yesterday suspicious for infection with 2+ leukocytes and 68 WBC; cultures pending. General surgery discussed PEG with family; will proceed with open gastrostomy tube on Thursday. Changed Bisoprolol to Metoprolol for tachycardia management. Cyproheptadine discontinued due to concerns of skin flushing and fever after doses given.     Interval History: Seen at bedside this AM. Patient appears more awake and alert after starting tube feeds. But remains non-verbal. Eyes tract movement well and she follows commands.     Review of Systems   Unable to perform ROS: Patient nonverbal     Objective:     Vital Signs (Most Recent):  Temp: 99.3 °F (37.4 °C) (12/11/18 0742)  Pulse: (!) 113 (12/11/18 0800)  Resp: 16 (12/11/18 0742)  BP: 117/75 (12/11/18 0742)  SpO2: 95 % (12/11/18 0742) Vital Signs (24h Range):  Temp:  [98 °F (36.7 °C)-99.6 °F (37.6 °C)] 99.3 °F (37.4 °C)  Pulse:  [] 113  Resp:  [16-20] 16  SpO2:  [93 %-96 %] 95 %  BP: (103-124)/(62-75) 117/75     Weight: 55.3 kg (122 lb)  Body mass index is 21.61 kg/m².    Intake/Output Summary (Last 24 hours) at 12/11/2018 1111  Last data filed at 12/11/2018 0500  Gross per 24 hour   Intake 680 ml   Output 1150 ml   Net -470 ml      Physical Exam   Constitutional: She is oriented to person, place, and time. She appears well-developed and well-nourished. No distress.   HENT:   Head: Normocephalic and atraumatic.   Mouth/Throat: Uvula is midline, oropharynx is clear and moist and mucous membranes are normal. Abnormal dentition. No dental abscesses. No tonsillar exudate.   Eyes: Conjunctivae are normal. Pupils are equal, round, and  reactive to light. No scleral icterus.   Neck: Neck supple. No JVD present.   Cardiovascular: Regular rhythm, normal heart sounds and normal pulses. Tachycardia present.   No murmur heard.  Pulmonary/Chest: Effort normal and breath sounds normal. No respiratory distress. She has no decreased breath sounds. She has no rales.   Abdominal: Soft. Normal appearance and bowel sounds are normal. She exhibits no distension. There is no tenderness.   Colostomy bag in RLQ; multiple well-healed previous abdominal surgery scars.    Genitourinary:   Genitourinary Comments: Hoover inserted.    Musculoskeletal:   There is atrophy of the muscles of the bilateral lower extremities distal to the knee.    Neurological: She is alert and oriented to person, place, and time. She displays no tremor. No cranial nerve deficit. GCS eye subscore is 4. GCS verbal subscore is 3. GCS motor subscore is 6. She displays no Babinski's sign on the right side. She displays no Babinski's sign on the left side.   There is contracture of the bilateral feel and upper extremities at the elbow with rigidity of movement at those joints.    Skin: Skin is warm and dry. No bruising and no rash noted. She is not diaphoretic.   There is reduced flushing around the lower face and anterior neck.        Significant Labs:   CBC:   Recent Labs   Lab 12/10/18  0448 12/11/18  0436   WBC 14.11* 13.90*   HGB 8.8* 9.0*   HCT 28.4* 29.6*   * 382*     CMP:   Recent Labs   Lab 12/10/18  0448 12/11/18  0436    146*   K 4.6 4.8    105   CO2 30* 30*   * 136*   BUN 18 19   CREATININE 0.9 0.9   CALCIUM 8.9 9.0   ANIONGAP 9 11   EGFRNONAA >60.0 >60.0       Significant Imaging: I have reviewed all pertinent imaging results/findings within the past 24 hours.  I have reviewed and interpreted all pertinent imaging results/findings within the past 24 hours.    Assessment/Plan:      * Fever    · Differentials here include recurrent aspiration pneumonia secondary  to dysphagia, bacteremia, and UTI.  · Admitted at OSH on 11/18 for high-grade SBO with recurrent bilateral pneumonia on CT scan on 11/26 at OSH.   · Hospital course at OSH complicated by symptoms of serotonin syndrome vs NMS with fever up to 107, status post Dantrolene, and contributed to Linezolid usage partly.   · Status post multiple courses of broad spectrum coverage including Linezolid, Cefepime, and Gentamicin; restarted on 12/4 with Vancomycin, Cefepime, and Diflucan after re-developing fevers. Urine culture at OSH positive for Candida.   · PICC line placed at OSH; unclear when inserted; will attempt to contact OSH as not clear in transfer paperwork.   · Admission labs notable for leukocytosis of 17 with 80% granulocytes, and lactic of 2.9; restarted Vanc/Zosyn on 12/6.  · HIV, RPR, RF negative. JÚNIOR pending.   · CT A/P (12/7) without evidence of abdominal infection, showed expected post-operative changes, but showed possible RLL consolidation concerning for possible aspiration pneumonia vs pneumonitis.    · ID consulted given persistent fevers without source status post multiple broad spectrum antibiotics; recommending holding all antibiotics after 12/7 and re-culturing if patient spikes fever again. Based on CT findings, have low threshold for starting Unasyn if patient decompensates.   · Febrile 12/10 to 100.7; blood cultures, UA, and CXR repeated. UA suspicious for possible infection with 3+ leukocytes and 68 WBC's; culture pending.   · Leukocytosis returned this morning to 14. Lactate 1.2 as of 12/7.  Day #2 of Unasyn for suspected aspiration PNA based on CT A/P.   · Consulted neurology on 12/08 for mental status changes; considering serotonin syndrome as possible explanation for fever, vital signs, and mental status changes given medications at OSH more associated with that compared to NMS. Started on cyproheptadine from 12/09-12/10.          Serotonin syndrome, presumed    See assessment for fever and  dementia without behavioral disturbance.        Hypernatremia    · Sodium was only 145 on admission on 11/18.   · Noted to have progressively increasing Na at OSH up to 153 prior to discharge. Could be explanation for patient's acute mental status change.   · Repeat here 154.   · Evaluated by nephrology there; treated on and off with D5NS and free water, but without consistent correction.   · Resolved initially after approximately 24 hours of D5W at low maintenance.   · Na up to 146 today; will give free water TID via NG daily for correction.      Dysphagia    · Noted to have severe oropharyngeal dysphagia.   · Failed 2 Modified barium swallows, failed again on 12/5; OSH recommendations were for non-oral feeding. Most recent barium swallow assessed as silent aspirator.   · She initially had J-tube after last abdominal surgery, but fell out without attempt on repair.   · GI tried to place a PEG 1 year ago; but was unable.   · IR would not want to place J-tube.   · General surgery at [facility] declined to operate secondary to complex anatomy.   · NG tube was in for bulk of hospitalization for decompression and then tube feedings, but removed for concern of nasopharyngeal erosions.   · Speech evaluated here; recommended strict NPO with aspiration precautions.   · General surgery evaluated patient afternoon of 12/7; after discussion with family will proceed with open PEG tube placement on 12/13.   · Consulted dietary/nutrition: Tube feed recommendations placed for initiation. Tube feeds at goal, but will need to watch for refeeding syndrome. Mg and P with daily labs.        Hypothyroidism    · Resumed home Synthroid.        Atrial fibrillation    · Rate controlled with Bisoprolol at home per outside records.   · CHADVASC score of 3; on no anticoagulation.   · Home Bisoprolol changed to Metoprolol BID for better tachycardia control.            Dementia without behavioral disturbance    · Baseline status unknown. Upper  level spoke with daughter Terri (phone number on sticky note). She states that since 2016 her mother's mental status has been declining she used to be able to do all her ADLs until she started having her abdominal surgeries due to a volvulus.  She was placed in a nursing home in 2016. In the last 4 months she has been less verbal and presented to the OSH for a chocking episode which lead to a cardiac arrest.  · Patient was noted to be awake, alert, and conversing throughout hospital stay at OSH after resolution of possible serotonin syndrome in ICU.   · Noted to have mental status change associated with minimal verbal response after redevelopment of fevers. CT head and LP at OSH unremarkable.   · Noted to be awake and alert, but with disordered response to questions on admission, no change today.   · Cannot resume home dementia medications with aspiration precautions.   · Neurology consulted on 12/8 for mental status changes/worsening dementia; per recommendations, ordered MRI brain with/without contrast which did not show any intracranial abnormalities   · Discussed with neurology; mentioned possibility of serotonin syndrome residual as explanation for vitals, neurological exam, and mental status changes, decided to start on cryptoheptadine 4 mg TID on 12/09 with minimal improvement. Cyproheptadine discontinued on 12/11 due to reported increased skin flushing and temperature spikes after dose.   · Per neurology, will continue home Aricept.           GERD (gastroesophageal reflux disease)    · Holding home PPI with aspiration precautions.        HLD (hyperlipidemia)    · No prior lipid panel in system.   · Holding home Atorvastatin.       Essential hypertension    · Controlled.   · Holding home BP medications as she is hypotensive  · Watch BP with Cyproheptadine   · Vitals ordered Q4H.          VTE Risk Mitigation (From admission, onward)        Ordered     enoxaparin injection 40 mg  Daily      12/10/18 1034      Place sequential compression device  Until discontinued      12/06/18 1138     IP VTE LOW RISK PATIENT  Once      12/06/18 1138              Kelvin Mcadams MD  Department of Hospital Medicine   [facility]

## 2018-12-11 NOTE — ASSESSMENT & PLAN NOTE
· Rate controlled with Bisoprolol at home per outside records.   · CHADVASC score of 3; on no anticoagulation.   · Home Bisoprolol changed to Metoprolol BID for better tachycardia control.

## 2018-12-11 NOTE — ASSESSMENT & PLAN NOTE
Ms Ornelas is a 69 yo F transferred from [facility] for fever of unknown source requesting ID and general surgery consultations for workup of fevers and placement of PEG in a patient with extensive surgical history.    - Yesterday spoke to daughter who agrees with open Gastrostomy tube placement.   - Patient more stable from fevers but still slightly tachycardic this morning. We will plan for Open Gastrostomy tube on Thursday for now.   - Will call Daughter later today to obtain consent.   - Thank you for the consult, please call if any questions, thank you.

## 2018-12-11 NOTE — ASSESSMENT & PLAN NOTE
· Controlled.   · Holding home BP medications as she is hypotensive  · Watch BP with Cyproheptadine   · Vitals ordered Q4H.

## 2018-12-12 PROBLEM — E87.5 HYPERKALEMIA: Status: ACTIVE | Noted: 2018-12-12

## 2018-12-12 PROBLEM — T83.511A URINARY TRACT INFECTION ASSOCIATED WITH CATHETERIZATION OF URINARY TRACT: Status: ACTIVE | Noted: 2018-12-12

## 2018-12-12 PROBLEM — J69.0 ASPIRATION PNEUMONIA: Status: ACTIVE | Noted: 2018-12-12

## 2018-12-12 PROBLEM — N39.0 URINARY TRACT INFECTION ASSOCIATED WITH CATHETERIZATION OF URINARY TRACT: Status: ACTIVE | Noted: 2018-12-12

## 2018-12-12 LAB
ANION GAP SERPL CALC-SCNC: 11 MMOL/L
BASOPHILS # BLD AUTO: 0.09 K/UL
BASOPHILS NFR BLD: 0.6 %
BUN SERPL-MCNC: 21 MG/DL
CALCIUM SERPL-MCNC: 9.4 MG/DL
CHLORIDE SERPL-SCNC: 106 MMOL/L
CO2 SERPL-SCNC: 28 MMOL/L
CREAT SERPL-MCNC: 0.9 MG/DL
DIFFERENTIAL METHOD: ABNORMAL
EOSINOPHIL # BLD AUTO: 0.2 K/UL
EOSINOPHIL NFR BLD: 1.3 %
ERYTHROCYTE [DISTWIDTH] IN BLOOD BY AUTOMATED COUNT: 19.2 %
EST. GFR  (AFRICAN AMERICAN): >60 ML/MIN/1.73 M^2
EST. GFR  (NON AFRICAN AMERICAN): >60 ML/MIN/1.73 M^2
GLUCOSE SERPL-MCNC: 154 MG/DL
HCT VFR BLD AUTO: 31.7 %
HGB BLD-MCNC: 9.8 G/DL
IMM GRANULOCYTES # BLD AUTO: 0.21 K/UL
IMM GRANULOCYTES NFR BLD AUTO: 1.5 %
LYMPHOCYTES # BLD AUTO: 2.3 K/UL
LYMPHOCYTES NFR BLD: 15.6 %
MAGNESIUM SERPL-MCNC: 1.9 MG/DL
MCH RBC QN AUTO: 31.4 PG
MCHC RBC AUTO-ENTMCNC: 30.9 G/DL
MCV RBC AUTO: 102 FL
MONOCYTES # BLD AUTO: 1.7 K/UL
MONOCYTES NFR BLD: 11.7 %
NEUTROPHILS # BLD AUTO: 10 K/UL
NEUTROPHILS NFR BLD: 69.3 %
NRBC BLD-RTO: 0 /100 WBC
PHOSPHATE SERPL-MCNC: 3.2 MG/DL
PLATELET # BLD AUTO: 406 K/UL
PMV BLD AUTO: 12.2 FL
POTASSIUM SERPL-SCNC: 5.6 MMOL/L
RBC # BLD AUTO: 3.12 M/UL
SODIUM SERPL-SCNC: 145 MMOL/L
WBC # BLD AUTO: 14.38 K/UL

## 2018-12-12 PROCEDURE — 83735 ASSAY OF MAGNESIUM: CPT

## 2018-12-12 PROCEDURE — 80048 BASIC METABOLIC PNL TOTAL CA: CPT

## 2018-12-12 PROCEDURE — 99232 PR SUBSEQUENT HOSPITAL CARE,LEVL II: ICD-10-PCS | Mod: GC,,, | Performed by: HOSPITALIST

## 2018-12-12 PROCEDURE — 25000003 PHARM REV CODE 250: Performed by: STUDENT IN AN ORGANIZED HEALTH CARE EDUCATION/TRAINING PROGRAM

## 2018-12-12 PROCEDURE — 85025 COMPLETE CBC W/AUTO DIFF WBC: CPT

## 2018-12-12 PROCEDURE — 93005 ELECTROCARDIOGRAM TRACING: CPT

## 2018-12-12 PROCEDURE — 84100 ASSAY OF PHOSPHORUS: CPT

## 2018-12-12 PROCEDURE — 20600001 HC STEP DOWN PRIVATE ROOM

## 2018-12-12 PROCEDURE — 93010 EKG 12-LEAD: ICD-10-PCS | Mod: ,,, | Performed by: INTERNAL MEDICINE

## 2018-12-12 PROCEDURE — 99232 SBSQ HOSP IP/OBS MODERATE 35: CPT | Mod: GC,,, | Performed by: HOSPITALIST

## 2018-12-12 PROCEDURE — 93010 ELECTROCARDIOGRAM REPORT: CPT | Mod: ,,, | Performed by: INTERNAL MEDICINE

## 2018-12-12 PROCEDURE — 63600175 PHARM REV CODE 636 W HCPCS: Performed by: HOSPITALIST

## 2018-12-12 PROCEDURE — 36415 COLL VENOUS BLD VENIPUNCTURE: CPT

## 2018-12-12 PROCEDURE — 63600175 PHARM REV CODE 636 W HCPCS: Performed by: STUDENT IN AN ORGANIZED HEALTH CARE EDUCATION/TRAINING PROGRAM

## 2018-12-12 RX ORDER — CLOMIPRAMINE HYDROCHLORIDE 50 MG/1
CAPSULE ORAL
Status: ON HOLD | COMMUNITY
End: 2018-12-17 | Stop reason: HOSPADM

## 2018-12-12 RX ORDER — MIRTAZAPINE 30 MG/1
30 TABLET, FILM COATED ORAL NIGHTLY
Status: ON HOLD | COMMUNITY
End: 2018-12-17 | Stop reason: HOSPADM

## 2018-12-12 RX ORDER — ASCORBIC ACID 500 MG
500 TABLET ORAL DAILY
Status: ON HOLD | COMMUNITY
End: 2018-12-17 | Stop reason: HOSPADM

## 2018-12-12 RX ORDER — CLONAZEPAM 1 MG/1
1 TABLET ORAL 3 TIMES DAILY
Status: ON HOLD | COMMUNITY
End: 2018-12-17 | Stop reason: HOSPADM

## 2018-12-12 RX ORDER — MULTIVITAMIN
1 TABLET ORAL DAILY
COMMUNITY

## 2018-12-12 RX ADMIN — Medication 6.25 MG: at 09:12

## 2018-12-12 RX ADMIN — DONEPEZIL HYDROCHLORIDE 10 MG: 5 TABLET, FILM COATED ORAL at 09:12

## 2018-12-12 RX ADMIN — AMPICILLIN SODIUM AND SULBACTAM SODIUM 3 G: 2; 1 INJECTION, POWDER, FOR SOLUTION INTRAMUSCULAR; INTRAVENOUS at 10:12

## 2018-12-12 RX ADMIN — ASPIRIN 81 MG CHEWABLE TABLET 81 MG: 81 TABLET CHEWABLE at 09:12

## 2018-12-12 RX ADMIN — AMPICILLIN SODIUM AND SULBACTAM SODIUM 3 G: 2; 1 INJECTION, POWDER, FOR SOLUTION INTRAMUSCULAR; INTRAVENOUS at 05:12

## 2018-12-12 RX ADMIN — AMPICILLIN SODIUM AND SULBACTAM SODIUM 3 G: 2; 1 INJECTION, POWDER, FOR SOLUTION INTRAMUSCULAR; INTRAVENOUS at 04:12

## 2018-12-12 RX ADMIN — LEVOTHYROXINE SODIUM 25 MCG: 25 TABLET ORAL at 07:12

## 2018-12-12 NOTE — PT/OT/SLP PROGRESS
Physical Therapy       Patient Name:  Silvia Ornelas   MRN:  [id number]    Pt not seen today 2/2 MD stating pt was at baseline since previous evaluation attempt. Please re consult if pt has change in functional status and is appropriate for skilled PT services.        Castillo Mendez, PT

## 2018-12-12 NOTE — ASSESSMENT & PLAN NOTE
· Sodium was only 145 on admission on 11/18.   · Noted to have progressively increasing Na at OSH up to 153 prior to discharge. Could be explanation for patient's acute mental status change.   · Repeat here 154.   · Evaluated by nephrology there; treated on and off with D5NS and free water, but without consistent correction.   · Resolved initially after approximately 24 hours of D5W at low maintenance.   · Na up to 145 today; giving free water TID via NG daily for correction.

## 2018-12-12 NOTE — PROGRESS NOTES
"[facility] Hospital Medicine  Progress Note    Patient Name: Silvia Ornelas  MRN: [id number]  Patient Class: IP- Inpatient   Admission Date: 12/6/2018  Length of Stay: 6 days  Attending Physician: Madison Barron MD  Primary Care Provider: Primary Doctor [facility] Medicine Team: [facility] HOSP MED 5 Kelvin Mcadams MD    Subjective:     Principal Problem:Fever    HPI:  This is a 68 year old female transferred from [facility] for fever, requesting ID and general surgery consultations. She has a PMH of severe dementia (baseline status unknown), Afib (rate controlled, not on anticoagulation), HTN, HLD, GERD, and hypothyroidism. 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     History obtained form [facility] paper chart review, secondary to patient's mental status.     She was hospitalized at [facility] on 11/10/18 for suspected aspiration pneumonia; status post treatment with Vanc/Zosyn. She was noted to have abnormal barium swallow at that time; PEG tube placement again discussed with family; they declined. Discharged back to nursing home on 11/16.     She presented again on 11/18/18 for new onset of nausea and vomiting. On presentation, labs significant for BRODY (BUN 24, Cr 2.6). CT A/P showed high-grade small bowel obstruction and bilateral lower lobe consolidations. She was started on Linezolid and Cefepime for suspected bilateral aspiration pneumonia. Her SBO was managed by general surgery with conservative measures, " including NG decompression, pain medications, and IVF.  She then began developing leukocytosis to 18, with new fevers (Tmax 101) on 11/23; 5 day course of Gentamicin was added on 11/22. On 11/26, she was noted to be passing gas and having bowel movements, but continued to have high NG bilious outptut. Repeat CT showed resolving SBO, but continued bilateral lower lobe consolidations. On 11/27, she spiked a fever to 107 (rectal probe) and developed new onset of extremity rigidity, hyperreflexia, and fine tremor, associated with worsening leukocytosis to 28, and lactic of 3. She was assessed to have serotonin syndrome secondary vs NMS due to possible combination of Linezolid, Dilaudid, Fentanyl, and Zofran. She received 140 mg of Dantrolene for possible NMS with symptom improvement; Linezolid discontinued. Patient's antibiotics switched to vancomycin and cefepime for aspiration pneumonitis, WBC was downtrending, and both antibiotics stopped on 12/3 after completed of 6-7 day antibiotic course.  Patient noted to be alert, conversant and oriented to self and with WBC downtrending, antibiotics stopped. Step down from ICU.    Overnight on 12/4, she had resumption of fevers (Tmax 101-102) with again features again of rigidity. Mental status change declined again with minimal  verbal response; patient noted to be ill appearing and diaphoretic.  Medications reviewed with pharmacy to ensure no active medications might be precipitating Serotonin Syndrome. CT head was unremarkable. Blood and urine cultures repeated. Hoover catheter exchanged. LP performed; gram stain negative, but other analyses pending. In discussion of case, decision made to watch patient overnight and re-eval on 12/5. On 12/5, her Vancomycin and Cefepime restarted, also on Diflucan for urine culture positive for Candida.     Additionally, she was noted to have severe oropharyngeal dysphagia. She failed 2 Modified barium swallows, failed again on 12/5; OSH  recommendations were for non-oral feeding. She initially had J-tube after last abdominal surgery, but fell out without attempt on repair. GI tried to place a PEG 1 year ago; but was unable.  IR would not want to place J-tube. General surgery at [facility] declined to operate secondary to complex anatomy.  NG tube was in for bulk of hospitalization for decompression and then tube feedings, but removed for concern of nasopharyngeal erosions.      Hospital Course:  12/6: Admission labs notable for leukocytosis of 17, lactic of 2.4, sodium of 154. Restarted on Vanc/Zosyn. ICU consulted for hypernatremia management; recommended D5W correction based on free water deficit. Started on 14 hours of D5W at 70 mL/hr.   12/7: Overnight, patient noted to be tachycardic to 120, with temperature spike to 101.4. Unable to give Tylenol because of NPO/aspiratioin precautions; defervescence without intervention. Leukocytosis down to 13, lactic down to 1.2. Sodium improved to 151; restarted on D5W 70 mL for 10 hours. Consulted general surgery and ID for further recommendations. Per ID, discontinued all antibiotics. Ordered HIV, RPR, EEG, JÚNIOR, and RF. Ordered CT A/P: no abscess, expected post-operative anatomy changes, but showing RLL consolidation possibly aspiration pneumonia. Patient remains awake, alert, but with disordered and delayed response to questioning.   12/8: No events overnight. Afebrile. Tachycardia improved to lower 100's. Leukocytosis resolved (WBC 10). Sodium normalized to 145. NG restarted with tube feeds and some home medications. Pending surgery decision on possible PEG after they review CT findings. Consulted neurology because of concern of mental status changes reported by family over hospital course. Ordered MRI with/without contrast. Considering serotonin syndrome as possible explanation for fevers, tachycardia, mental status, and contractures on exam.   12/9: No events overnight. Afebrile. No leukocytosis. Still  borderline tachycardia. MRI unremarkable. Started on Cyproheptadine.   12/10:  Febrile overnight to 100.7. Leukocytosis returned to 14. Blood and urine CX repeated. CXR without signs of infection. Started Unasyn, based on RLL consolidation on CT A/P.   12/11: No events overnight. Afebrile, with leukocytosis unchanged (14). Day #2 of Unasyn. UA from yesterday suspicious for infection with 2+ leukocytes and 68 WBC; cultures pending. General surgery discussed PEG with family; will proceed with open gastrostomy tube on Thursday. Changed Bisoprolol to Metoprolol for tachycardia management. Cyproheptadine discontinued due to concerns of skin flushing and fever after doses given.   12/12: No events overnight. Tmax 100.3 overnight. Leukocytosis unchanged (14.4). Hyperkalemic this AM to 5.6; EKG without T-wave abnormalities. Day #3 of Unasyn. Patient more verbal today with staff. NPO at midnight for open G-tube placement tomorrow.     Interval History: Seen at bedside this AM. Patient appears more awake and alert after starting tube feeds. Patient to be more verbal with staff today with shouting questions; non-verbal for me. Eyes tract movement well and she follows commands.     Review of Systems   Unable to perform ROS: Patient nonverbal     Objective:     Vital Signs (Most Recent):  Temp: 98.5 °F (36.9 °C) (12/12/18 1208)  Pulse: 103(inform nurse) (12/12/18 1208)  Resp: 18 (12/12/18 1208)  BP: 121/72 (12/12/18 1208)  SpO2: 97 % (12/12/18 1208) Vital Signs (24h Range):  Temp:  [97.7 °F (36.5 °C)-99.3 °F (37.4 °C)] 98.5 °F (36.9 °C)  Pulse:  [] 103  Resp:  [16-20] 18  SpO2:  [94 %-97 %] 97 %  BP: (116-126)/(64-77) 121/72     Weight: 55.3 kg (121 lb 14.6 oz)  Body mass index is 21.6 kg/m².    Intake/Output Summary (Last 24 hours) at 12/12/2018 1253  Last data filed at 12/12/2018 0500  Gross per 24 hour   Intake 1180 ml   Output 1575 ml   Net -395 ml      Physical Exam   Constitutional: She is oriented to person, place,  and time. She appears well-developed and well-nourished. No distress.   HENT:   Head: Normocephalic and atraumatic.   Mouth/Throat: Uvula is midline, oropharynx is clear and moist and mucous membranes are normal. Abnormal dentition. No dental abscesses. No tonsillar exudate.   Eyes: Conjunctivae are normal. Pupils are equal, round, and reactive to light. No scleral icterus.   Neck: Neck supple. No JVD present.   Cardiovascular: Regular rhythm, normal heart sounds and normal pulses. Tachycardia present.   No murmur heard.  Pulmonary/Chest: Effort normal and breath sounds normal. No respiratory distress. She has no decreased breath sounds. She has no rales.   Abdominal: Soft. Normal appearance and bowel sounds are normal. She exhibits no distension. There is no tenderness.   Colostomy bag in RLQ; multiple well-healed previous abdominal surgery scars.    Genitourinary:   Genitourinary Comments: Hoover inserted.    Musculoskeletal:   There is atrophy of the muscles of the bilateral lower extremities distal to the knee.    Neurological: She is alert and oriented to person, place, and time. She displays no tremor. No cranial nerve deficit. GCS eye subscore is 4. GCS verbal subscore is 3. GCS motor subscore is 6. She displays no Babinski's sign on the right side. She displays no Babinski's sign on the left side.   There is contracture of the bilateral feel and upper extremities at the elbow with rigidity of movement at those joints.    Skin: Skin is warm and dry. No bruising and no rash noted. She is not diaphoretic.   There is reduced flushing around the lower face and anterior neck.        Significant Labs:   CBC:   Recent Labs   Lab 12/11/18 0436 12/12/18 0417   WBC 13.90* 14.38*   HGB 9.0* 9.8*   HCT 29.6* 31.7*   * 406*     CMP:   Recent Labs   Lab 12/11/18 0436 12/12/18 0417   * 145   K 4.8 5.6*    106   CO2 30* 28   * 154*   BUN 19 21   CREATININE 0.9 0.9   CALCIUM 9.0 9.4   ANIONGAP 11  11   EGFRNONAA >60.0 >60.0       Significant Imaging: I have reviewed all pertinent imaging results/findings within the past 24 hours.  I have reviewed and interpreted all pertinent imaging results/findings within the past 24 hours.    Assessment/Plan:      * Fever    · Differentials here include recurrent aspiration pneumonia secondary to dysphagia, bacteremia, and UTI.  · Admitted at OSH on 11/18 for high-grade SBO with recurrent bilateral pneumonia on CT scan on 11/26 at OSH.   · Hospital course at OSH complicated by symptoms of serotonin syndrome vs NMS with fever up to 107, status post Dantrolene, and contributed to Linezolid usage partly.   · Status post multiple courses of broad spectrum coverage including Linezolid, Cefepime, and Gentamicin; restarted on 12/4 with Vancomycin, Cefepime, and Diflucan after re-developing fevers. Urine culture at OSH positive for Candida.   · PICC line placed at OSH; unclear when inserted; will attempt to contact OSH as not clear in transfer paperwork.   · Admission labs notable for leukocytosis of 17 with 80% granulocytes, and lactic of 2.9; restarted Vanc/Zosyn on 12/6.  · HIV, RPR, RF negative. JÚNIOR pending.   · CT A/P (12/7) without evidence of abdominal infection, showed expected post-operative changes, but showed possible RLL consolidation concerning for possible aspiration pneumonia vs pneumonitis.    · ID consulted given persistent fevers without source status post multiple broad spectrum antibiotics; recommending holding all antibiotics after 12/7 and re-culturing if patient spikes fever again. Based on CT findings, have low threshold for starting Unasyn if patient decompensates.   · Febrile 12/10 to 100.7; blood cultures, UA, and CXR repeated. UA suspicious for possible infection with 3+ leukocytes and 68 WBC's; culture pending.   · Leukocytosis stable this AM at 14. Lactate 1.2 as of 12/7.  Day #3 of Unasyn for suspected aspiration PNA based on CT A/P, but also  covering for suspected UA.   · Consulted neurology on 12/08 for mental status changes; considering serotonin syndrome as possible explanation for fever, vital signs, and mental status changes given medications at OSH more associated with that compared to NMS. Started on cyproheptadine from 12/09-12/10.          Urinary tract infection associated with catheterization of urinary tract    See assessment for fever.        Aspiration pneumonia    See assessment for fever.        Hyperkalemia    · Potassium up to 5.6 today.   · Likely secondary to over-supplementation with K and phosphorous supplementation containing K.   · EKG ordered; no T wave abnormalities.   · Trending K with BMP.        Serotonin syndrome, presumed    See assessment for fever and dementia without behavioral disturbance.        Hypernatremia    · Sodium was only 145 on admission on 11/18.   · Noted to have progressively increasing Na at OSH up to 153 prior to discharge. Could be explanation for patient's acute mental status change.   · Repeat here 154.   · Evaluated by nephrology there; treated on and off with D5NS and free water, but without consistent correction.   · Resolved initially after approximately 24 hours of D5W at low maintenance.   · Na up to 145 today; giving free water TID via NG daily for correction.      Dysphagia    · Noted to have severe oropharyngeal dysphagia.   · Failed 2 Modified barium swallows, failed again on 12/5; OSH recommendations were for non-oral feeding. Most recent barium swallow assessed as silent aspirator.   · She initially had J-tube after last abdominal surgery, but fell out without attempt on repair.   · GI tried to place a PEG 1 year ago; but was unable.   · IR would not want to place J-tube.   · General surgery at [facility] declined to operate secondary to complex anatomy.   · NG tube was in for bulk of hospitalization for decompression and then tube feedings, but removed for concern of nasopharyngeal  erosions.   · Speech evaluated here; recommended strict NPO with aspiration precautions.   · General surgery evaluated patient afternoon of 12/7; after discussion with family will proceed with open PEG tube placement on 12/13. NPO at midnight.   · Consulted dietary/nutrition: Tube feed recommendations placed for initiation. Tube feeds at goal, but will need to watch for refeeding syndrome. Mg and P with daily labs.        Hypothyroidism    · Resumed home Synthroid.        Atrial fibrillation    · Rate controlled with Bisoprolol at home per outside records.   · CHADVASC score of 3; on no anticoagulation.   · Home Bisoprolol changed to Metoprolol BID for better tachycardia control.            Dementia without behavioral disturbance    · Baseline status unknown. Upper level spoke with daughter Terri (phone number on sticky note). She states that since 2016 her mother's mental status has been declining she used to be able to do all her ADLs until she started having her abdominal surgeries due to a volvulus.  She was placed in a nursing home in 2016. In the last 4 months she has been less verbal and presented to the OSH for a chocking episode which lead to a cardiac arrest.  · Patient was noted to be awake, alert, and conversing throughout hospital stay at OSH after resolution of possible serotonin syndrome in ICU.   · Noted to have mental status change associated with minimal verbal response after redevelopment of fevers. CT head and LP at OSH unremarkable.   · Noted to be awake and alert, but with disordered response to questions on admission, no change today.   · Cannot resume home dementia medications with aspiration precautions.   · Neurology consulted on 12/8 for mental status changes/worsening dementia; per recommendations, ordered MRI brain with/without contrast which did not show any intracranial abnormalities   · Discussed with neurology; mentioned possibility of serotonin syndrome residual as explanation for  vitals, neurological exam, and mental status changes, decided to start on cryptoheptadine 4 mg TID on 12/09 with minimal improvement. Cyproheptadine discontinued on 12/11 due to reported increased skin flushing and temperature spikes after dose.   · Resumed dementia medications on 12/11 per neurology recs. Neurology signed off.            GERD (gastroesophageal reflux disease)    · Holding home PPI with aspiration precautions.        HLD (hyperlipidemia)    · No prior lipid panel in system.   · Holding home Atorvastatin.       Essential hypertension    · Controlled.   · Holding home BP medications as she is hypotensive  · Watch BP with Cyproheptadine   · Vitals ordered Q4H.          VTE Risk Mitigation (From admission, onward)        Ordered     enoxaparin injection 40 mg  Daily      12/10/18 1817     Place sequential compression device  Until discontinued      12/06/18 1138     IP VTE LOW RISK PATIENT  Once      12/06/18 1138              Kelvin Mcadams MD  Department of Hospital Medicine   [facility]

## 2018-12-12 NOTE — ASSESSMENT & PLAN NOTE
· Noted to have severe oropharyngeal dysphagia.   · Failed 2 Modified barium swallows, failed again on 12/5; OSH recommendations were for non-oral feeding. Most recent barium swallow assessed as silent aspirator.   · She initially had J-tube after last abdominal surgery, but fell out without attempt on repair.   · GI tried to place a PEG 1 year ago; but was unable.   · IR would not want to place J-tube.   · General surgery at [facility] declined to operate secondary to complex anatomy.   · NG tube was in for bulk of hospitalization for decompression and then tube feedings, but removed for concern of nasopharyngeal erosions.   · Speech evaluated here; recommended strict NPO with aspiration precautions.   · General surgery evaluated patient afternoon of 12/7; after discussion with family will proceed with open PEG tube placement on 12/13. NPO at midnight.   · Consulted dietary/nutrition: Tube feed recommendations placed for initiation. Tube feeds at goal, but will need to watch for refeeding syndrome. Mg and P with daily labs.

## 2018-12-12 NOTE — ASSESSMENT & PLAN NOTE
· Potassium up to 5.6 today.   · Likely secondary to over-supplementation with K and phosphorous supplementation containing K.   · EKG ordered; no T wave abnormalities.   · Trending K with BMP.

## 2018-12-12 NOTE — ASSESSMENT & PLAN NOTE
· Differentials here include recurrent aspiration pneumonia secondary to dysphagia, bacteremia, and UTI.  · Admitted at OSH on 11/18 for high-grade SBO with recurrent bilateral pneumonia on CT scan on 11/26 at OSH.   · Hospital course at OSH complicated by symptoms of serotonin syndrome vs NMS with fever up to 107, status post Dantrolene, and contributed to Linezolid usage partly.   · Status post multiple courses of broad spectrum coverage including Linezolid, Cefepime, and Gentamicin; restarted on 12/4 with Vancomycin, Cefepime, and Diflucan after re-developing fevers. Urine culture at OSH positive for Candida.   · PICC line placed at OSH; unclear when inserted; will attempt to contact OSH as not clear in transfer paperwork.   · Admission labs notable for leukocytosis of 17 with 80% granulocytes, and lactic of 2.9; restarted Vanc/Zosyn on 12/6.  · HIV, RPR, RF negative. JÚNIOR pending.   · CT A/P (12/7) without evidence of abdominal infection, showed expected post-operative changes, but showed possible RLL consolidation concerning for possible aspiration pneumonia vs pneumonitis.    · ID consulted given persistent fevers without source status post multiple broad spectrum antibiotics; recommending holding all antibiotics after 12/7 and re-culturing if patient spikes fever again. Based on CT findings, have low threshold for starting Unasyn if patient decompensates.   · Febrile 12/10 to 100.7; blood cultures, UA, and CXR repeated. UA suspicious for possible infection with 3+ leukocytes and 68 WBC's; culture pending.   · Leukocytosis stable this AM at 14. Lactate 1.2 as of 12/7.  Day #3 of Unasyn for suspected aspiration PNA based on CT A/P, but also covering for suspected UA.   · Consulted neurology on 12/08 for mental status changes; considering serotonin syndrome as possible explanation for fever, vital signs, and mental status changes given medications at OSH more associated with that compared to NMS. Started on  cyproheptadine from 12/09-12/10.

## 2018-12-12 NOTE — ASSESSMENT & PLAN NOTE
· Baseline status unknown. Upper level spoke with daughter Terri (phone number on sticky note). She states that since 2016 her mother's mental status has been declining she used to be able to do all her ADLs until she started having her abdominal surgeries due to a volvulus.  She was placed in a nursing home in 2016. In the last 4 months she has been less verbal and presented to the OSH for a chocking episode which lead to a cardiac arrest.  · Patient was noted to be awake, alert, and conversing throughout hospital stay at OSH after resolution of possible serotonin syndrome in ICU.   · Noted to have mental status change associated with minimal verbal response after redevelopment of fevers. CT head and LP at OSH unremarkable.   · Noted to be awake and alert, but with disordered response to questions on admission, no change today.   · Cannot resume home dementia medications with aspiration precautions.   · Neurology consulted on 12/8 for mental status changes/worsening dementia; per recommendations, ordered MRI brain with/without contrast which did not show any intracranial abnormalities   · Discussed with neurology; mentioned possibility of serotonin syndrome residual as explanation for vitals, neurological exam, and mental status changes, decided to start on cryptoheptadine 4 mg TID on 12/09 with minimal improvement. Cyproheptadine discontinued on 12/11 due to reported increased skin flushing and temperature spikes after dose.   · Resumed dementia medications on 12/11 per neurology recs. Neurology signed off.

## 2018-12-12 NOTE — SUBJECTIVE & OBJECTIVE
Interval History: Seen at bedside this AM. Patient appears more awake and alert after starting tube feeds. Patient to be more verbal with staff today with shouting questions; non-verbal for me. Eyes tract movement well and she follows commands.     Review of Systems   Unable to perform ROS: Patient nonverbal     Objective:     Vital Signs (Most Recent):  Temp: 98.5 °F (36.9 °C) (12/12/18 1208)  Pulse: 103(inform nurse) (12/12/18 1208)  Resp: 18 (12/12/18 1208)  BP: 121/72 (12/12/18 1208)  SpO2: 97 % (12/12/18 1208) Vital Signs (24h Range):  Temp:  [97.7 °F (36.5 °C)-99.3 °F (37.4 °C)] 98.5 °F (36.9 °C)  Pulse:  [] 103  Resp:  [16-20] 18  SpO2:  [94 %-97 %] 97 %  BP: (116-126)/(64-77) 121/72     Weight: 55.3 kg (121 lb 14.6 oz)  Body mass index is 21.6 kg/m².    Intake/Output Summary (Last 24 hours) at 12/12/2018 1253  Last data filed at 12/12/2018 0500  Gross per 24 hour   Intake 1180 ml   Output 1575 ml   Net -395 ml      Physical Exam   Constitutional: She is oriented to person, place, and time. She appears well-developed and well-nourished. No distress.   HENT:   Head: Normocephalic and atraumatic.   Mouth/Throat: Uvula is midline, oropharynx is clear and moist and mucous membranes are normal. Abnormal dentition. No dental abscesses. No tonsillar exudate.   Eyes: Conjunctivae are normal. Pupils are equal, round, and reactive to light. No scleral icterus.   Neck: Neck supple. No JVD present.   Cardiovascular: Regular rhythm, normal heart sounds and normal pulses. Tachycardia present.   No murmur heard.  Pulmonary/Chest: Effort normal and breath sounds normal. No respiratory distress. She has no decreased breath sounds. She has no rales.   Abdominal: Soft. Normal appearance and bowel sounds are normal. She exhibits no distension. There is no tenderness.   Colostomy bag in RLQ; multiple well-healed previous abdominal surgery scars.    Genitourinary:   Genitourinary Comments: Hoover inserted.    Musculoskeletal:    There is atrophy of the muscles of the bilateral lower extremities distal to the knee.    Neurological: She is alert and oriented to person, place, and time. She displays no tremor. No cranial nerve deficit. GCS eye subscore is 4. GCS verbal subscore is 3. GCS motor subscore is 6. She displays no Babinski's sign on the right side. She displays no Babinski's sign on the left side.   There is contracture of the bilateral feel and upper extremities at the elbow with rigidity of movement at those joints.    Skin: Skin is warm and dry. No bruising and no rash noted. She is not diaphoretic.   There is reduced flushing around the lower face and anterior neck.        Significant Labs:   CBC:   Recent Labs   Lab 12/11/18 0436 12/12/18 0417   WBC 13.90* 14.38*   HGB 9.0* 9.8*   HCT 29.6* 31.7*   * 406*     CMP:   Recent Labs   Lab 12/11/18 0436 12/12/18 0417   * 145   K 4.8 5.6*    106   CO2 30* 28   * 154*   BUN 19 21   CREATININE 0.9 0.9   CALCIUM 9.0 9.4   ANIONGAP 11 11   EGFRNONAA >60.0 >60.0       Significant Imaging: I have reviewed all pertinent imaging results/findings within the past 24 hours.  I have reviewed and interpreted all pertinent imaging results/findings within the past 24 hours.

## 2018-12-12 NOTE — ASSESSMENT & PLAN NOTE
Ms Ornelas is a 69 yo F transferred from [facility] for fever of unknown source requesting ID and general surgery consultations for workup of fevers and placement of PEG in a patient with extensive surgical history.    - PEG possible Open gastrostomy tube placement tomorrow.   - Consent obtained from Daughter.   - Keep patient NPO after MN.  - Thank you for the consult, please call if any questions, thank you.

## 2018-12-12 NOTE — PLAN OF CARE
Problem: Adult Inpatient Plan of Care  Goal: Plan of Care Review  Outcome: Ongoing (interventions implemented as appropriate)  Patient resting comfortably, xray ordered at the beginning of shift due to displacement of NG tube, NG tube advanced. Tube feedings restarted. Turned q2 hours. Antibiotics therapy continued. Plans for PEG tube placement Thursday 12/13. Patient stable will continue to monitor.

## 2018-12-12 NOTE — SUBJECTIVE & OBJECTIVE
Interval History:     NAEO. Got consent today from daughter to proceed with surgery.     Medications:  Continuous Infusions:  Scheduled Meds:   ampicillin-sulbactim (UNASYN) IVPB  3 g Intravenous Q6H    aspirin  81 mg Per NG tube Daily    donepezil  10 mg Per NG tube QHS    levothyroxine  25 mcg Per NG tube Daily    metoprolol  6.25 mg Per NG tube BID     PRN Meds:acetaminophen, dextrose 50%, dextrose 50%, glucagon (human recombinant), glucose, glucose, ondansetron, sodium chloride 0.9%     Review of patient's allergies indicates:   Allergen Reactions    Sulfa (sulfonamide antibiotics) Other (See Comments)     Severe reaction type unknown      Objective:     Vital Signs (Most Recent):  Temp: 99.9 °F (37.7 °C) (12/12/18 1603)  Pulse: (!) 119 (12/12/18 1603)  Resp: 18 (12/12/18 1603)  BP: (!) 103/55 (12/12/18 1603)  SpO2: 97 % (12/12/18 1603) Vital Signs (24h Range):  Temp:  [98.1 °F (36.7 °C)-99.9 °F (37.7 °C)] 99.9 °F (37.7 °C)  Pulse:  [] 119  Resp:  [16-20] 18  SpO2:  [94 %-97 %] 97 %  BP: (103-126)/(55-77) 103/55     Weight: 55.3 kg (121 lb 14.6 oz)  Body mass index is 21.6 kg/m².    Intake/Output - Last 3 Shifts       12/10 0700 - 12/11 0659 12/11 0700 - 12/12 0659 12/12 0700 - 12/13 0659    P.O.       Other  100     NG/ 1280 400    Total Intake(mL/kg) 680 (12.3) 1380 (25) 400 (7.2)    Urine (mL/kg/hr) 850 (0.6) 1100 (0.8)     Stool 300 475     Total Output 1150 1575     Net -470 -195 +400           Urine Occurrence 1 x      Stool Occurrence  0 x           Physical Exam     General: Severely Demented  CV: RRR  Pulm: On NC  Abd: soft, non distended, non tender. Midline laparotomy scar. G and J tube scars. LLQ ileostomy and LUQ mucous fistula?  Ext: wwp       Significant Labs:  CBC:   Recent Labs   Lab 12/12/18  0417   WBC 14.38*   RBC 3.12*   HGB 9.8*   HCT 31.7*   *   *   MCH 31.4*   MCHC 30.9*     CMP:   Recent Labs   Lab 12/06/18  1255  12/12/18  0417   GLU 97   < > 154*    CALCIUM 9.6   < > 9.4   ALBUMIN 2.9*  --   --    PROT 7.5  --   --    *   < > 145   K 3.6   < > 5.6*   CO2 24   < > 28   *   < > 106   BUN 17   < > 21   CREATININE 0.8   < > 0.9   ALKPHOS 104  --   --    ALT 30  --   --    AST 20  --   --    BILITOT 0.4  --   --     < > = values in this interval not displayed.

## 2018-12-12 NOTE — PHARMACY MED REC
".Admission Medication Reconciliation - Pharmacy Consult Note    The home medication history was taken by Sharmin Diamond, Pharmacy Tech.  Based on information gathered and subsequent review by the clinical pharmacist, the items below may need attention.     You may go to "Admission" then "Reconcile Home Medications" tabs to review and/or act upon these items.     Potentially problematic discrepancies with current MAR  o Patient IS taking the following which was not ordered upon admit  o Pantoprazole 40 mg PO daily (suspension available for NG tube)  o Atorvastatin 5 mg PO daily (held upon admission)      Please address this information as you see fit.  Feel free to contact us if you have any questions or require assistance.    Amelia Willams, PharmD  [id number]                  .    .            "

## 2018-12-12 NOTE — PROGRESS NOTES
"[facility]  General Surgery  Progress Note    Subjective:     History of Present Illness:  Ms Ornelas is a 67 yo F transferred from [facility] for fever of unknown source requesting ID and general surgery consultations for workup of fevers and placement of PEG in a patient with extensive surgical history.    From primary team note:     She has a PMH of severe dementia (baseline status unknown), Afib (rate controlled, not on anticoagulation), HTN, HLD, GERD, and hypothyroidism.     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      History obtained form OSH paper chart review, secondary to patient's mental status.      She was hospitalized at [facility] on 11/10/18 for suspected aspiration pneumonia; status post treatment with Vanc/Zosyn. She was noted to have abnormal barium swallow at that time.   PEG tube placement again discussed with family; they declined.    Discharged back to nursing home on 11/16.      She presented again on 11/18/18 for new onset of nausea and vomiting. On presentation, labs significant for BRODY (BUN 24, Cr 2.6). CT A/P showed high-grade small bowel obstruction and bilateral lower lobe consolidations. She was started on Linezolid and Cefepime for suspected bilateral aspiration pneumonia. Her SBO was managed by general surgery with conservative measures, including NG decompression, pain medications, and IVF.  She then began developing leukocytosis to 18, with new fevers (Tmax 101) " on 11/23; 5 day course of Gentamicin was added on 11/22.     On 11/26, she was noted to be passing gas and having bowel movements, but continued to have high NG bilious outptut. Repeat CT showed resolving SBO, but continued bilateral lower lobe consolidations. On 11/27, she spiked a fever to 107 (rectal probe) and developed new onset of extremity rigidity, hyperreflexia, and fine tremor, associated with worsening leukocytosis to 28, and lactic of 3. She was assessed to have serotonin syndrome secondary vs NMS due to possible combination of Linezolid, Dilaudid, Fentanyl, and Zofran. She received 140 mg of Dantrolene for possible NMS with symptom improvement; Linezolid discontinued. Patient's antibiotics switched to vancomycin and cefepime for aspiration pneumonitis, WBC was downtrending, and both antibiotics stopped on 12/3 after completed of 6-7 day antibiotic course.  Patient noted to be alert, conversant and oriented to self and with WBC downtrending, antibiotics stopped. Step down from ICU.     Overnight on 12/4, she had resumption of fevers (Tmax 101-102) with again features again of rigidity. Mental status change declined again with minimal  verbal response; patient noted to be ill appearing and diaphoretic.  Medications reviewed with pharmacy to ensure no active medications might be precipitating Serotonin Syndrome. CT head was unremarkable. Blood and urine cultures repeated. Hoover catheter exchanged. LP performed; gram stain negative, but other analyses pending. In discussion of case, decision made to watch patient overnight and re-eval on 12/5. On 12/5, her Vancomycin and Cefepime restarted, also on Diflucan for urine culture positive for Candida.      Additionally, she was noted to have severe oropharyngeal dysphagia. She failed 2 Modified barium swallows, failed again on 12/5; OSH recommendations were for non-oral feeding. She initially had J-tube after last abdominal surgery, but fell out without attempt  on repair. GI tried to place a PEG 1 year ago; but was unable.  IR would not want to place J-tube. General surgery at [facility] declined to operate secondary to complex anatomy.      NG tube was in for bulk of hospitalization for decompression and then tube feedings, but removed for concern of nasopharyngeal erosions.           Post-Op Info:  Procedure(s) (LRB):  PEG possible Open Gastrostomy tube placement. (N/A)         Interval History:     NAEO. Got consent today from daughter to proceed with surgery.     Medications:  Continuous Infusions:  Scheduled Meds:   ampicillin-sulbactim (UNASYN) IVPB  3 g Intravenous Q6H    aspirin  81 mg Per NG tube Daily    donepezil  10 mg Per NG tube QHS    levothyroxine  25 mcg Per NG tube Daily    metoprolol  6.25 mg Per NG tube BID     PRN Meds:acetaminophen, dextrose 50%, dextrose 50%, glucagon (human recombinant), glucose, glucose, ondansetron, sodium chloride 0.9%     Review of patient's allergies indicates:   Allergen Reactions    Sulfa (sulfonamide antibiotics) Other (See Comments)     Severe reaction type unknown      Objective:     Vital Signs (Most Recent):  Temp: 99.9 °F (37.7 °C) (12/12/18 1603)  Pulse: (!) 119 (12/12/18 1603)  Resp: 18 (12/12/18 1603)  BP: (!) 103/55 (12/12/18 1603)  SpO2: 97 % (12/12/18 1603) Vital Signs (24h Range):  Temp:  [98.1 °F (36.7 °C)-99.9 °F (37.7 °C)] 99.9 °F (37.7 °C)  Pulse:  [] 119  Resp:  [16-20] 18  SpO2:  [94 %-97 %] 97 %  BP: (103-126)/(55-77) 103/55     Weight: 55.3 kg (121 lb 14.6 oz)  Body mass index is 21.6 kg/m².    Intake/Output - Last 3 Shifts       12/10 0700 - 12/11 0659 12/11 0700 - 12/12 0659 12/12 0700 - 12/13 0659    P.O.       Other  100     NG/ 1280 400    Total Intake(mL/kg) 680 (12.3) 1380 (25) 400 (7.2)    Urine (mL/kg/hr) 850 (0.6) 1100 (0.8)     Stool 300 475     Total Output 1150 1575     Net -470 -195 +400           Urine Occurrence 1 x      Stool Occurrence  0 x           Physical Exam      General: Severely Demented  CV: RRR  Pulm: On NC  Abd: soft, non distended, non tender. Midline laparotomy scar. G and J tube scars. LLQ ileostomy and LUQ mucous fistula?  Ext: wwp       Significant Labs:  CBC:   Recent Labs   Lab 12/12/18  0417   WBC 14.38*   RBC 3.12*   HGB 9.8*   HCT 31.7*   *   *   MCH 31.4*   MCHC 30.9*     CMP:   Recent Labs   Lab 12/06/18  1255  12/12/18  0417   GLU 97   < > 154*   CALCIUM 9.6   < > 9.4   ALBUMIN 2.9*  --   --    PROT 7.5  --   --    *   < > 145   K 3.6   < > 5.6*   CO2 24   < > 28   *   < > 106   BUN 17   < > 21   CREATININE 0.8   < > 0.9   ALKPHOS 104  --   --    ALT 30  --   --    AST 20  --   --    BILITOT 0.4  --   --     < > = values in this interval not displayed.           Assessment/Plan:     Dysphagia    Ms Ornelas is a 67 yo F [place] fever of unknown source requesting ID and general surgery consultations for workup of fevers and placement of PEG in a patient with extensive surgical history.    - PEG possible Open gastrostomy tube placement tomorrow.   - Consent obtained from Daughter.   - Keep patient NPO after MN.  - Thank you for the consult, please call if any questions, thank you.             Delia Hernandez MD  General Surgery PGY V  Beeper: [phone]

## 2018-12-12 NOTE — PT/OT/SLP PROGRESS
Occupational Therapy      Patient Name:  Silvia Ornelas   MRN:  [id number]    Patient not seen today secondary to Other (Comment)(MD D/C orders). Orders received and acknowledge. Pt eval completed by OT on 12/7, where pt was D/C from OT services. Discussed w/ PT and MD. MARIN to D/C orders at this time and will re-consult if appropriate in future. Will follow-up as scheduled.    Jona Esquivel OT  12/12/2018

## 2018-12-13 ENCOUNTER — ANESTHESIA EVENT (OUTPATIENT)
Dept: SURGERY | Facility: HOSPITAL | Age: 68
DRG: 853 | End: 2018-12-13
Payer: MEDICARE

## 2018-12-13 ENCOUNTER — ANESTHESIA (OUTPATIENT)
Dept: SURGERY | Facility: HOSPITAL | Age: 68
DRG: 853 | End: 2018-12-13
Payer: MEDICARE

## 2018-12-13 LAB
ANION GAP SERPL CALC-SCNC: 12 MMOL/L
BACTERIA UR CULT: NORMAL
BASOPHILS # BLD AUTO: 0.06 K/UL
BASOPHILS NFR BLD: 0.5 %
BUN SERPL-MCNC: 21 MG/DL
CALCIUM SERPL-MCNC: 9.2 MG/DL
CHLORIDE SERPL-SCNC: 102 MMOL/L
CO2 SERPL-SCNC: 26 MMOL/L
CREAT SERPL-MCNC: 0.8 MG/DL
DIFFERENTIAL METHOD: ABNORMAL
EOSINOPHIL # BLD AUTO: 0.2 K/UL
EOSINOPHIL NFR BLD: 1.4 %
ERYTHROCYTE [DISTWIDTH] IN BLOOD BY AUTOMATED COUNT: 19 %
EST. GFR  (AFRICAN AMERICAN): >60 ML/MIN/1.73 M^2
EST. GFR  (NON AFRICAN AMERICAN): >60 ML/MIN/1.73 M^2
GLUCOSE SERPL-MCNC: 117 MG/DL
HCT VFR BLD AUTO: 29.1 %
HGB BLD-MCNC: 9 G/DL
IMM GRANULOCYTES # BLD AUTO: 0.17 K/UL
IMM GRANULOCYTES NFR BLD AUTO: 1.3 %
LYMPHOCYTES # BLD AUTO: 2.1 K/UL
LYMPHOCYTES NFR BLD: 16.1 %
MAGNESIUM SERPL-MCNC: 1.7 MG/DL
MCH RBC QN AUTO: 30.4 PG
MCHC RBC AUTO-ENTMCNC: 30.9 G/DL
MCV RBC AUTO: 98 FL
MONOCYTES # BLD AUTO: 1.4 K/UL
MONOCYTES NFR BLD: 11 %
NEUTROPHILS # BLD AUTO: 9.1 K/UL
NEUTROPHILS NFR BLD: 69.7 %
NRBC BLD-RTO: 0 /100 WBC
PHOSPHATE SERPL-MCNC: 3.1 MG/DL
PLATELET # BLD AUTO: 380 K/UL
PMV BLD AUTO: 12.6 FL
POTASSIUM SERPL-SCNC: 4.6 MMOL/L
RBC # BLD AUTO: 2.96 M/UL
SODIUM SERPL-SCNC: 140 MMOL/L
WBC # BLD AUTO: 13.04 K/UL

## 2018-12-13 PROCEDURE — 71000033 HC RECOVERY, INTIAL HOUR: Performed by: SURGERY

## 2018-12-13 PROCEDURE — 63600175 PHARM REV CODE 636 W HCPCS: Performed by: NURSE ANESTHETIST, CERTIFIED REGISTERED

## 2018-12-13 PROCEDURE — 37000009 HC ANESTHESIA EA ADD 15 MINS: Performed by: SURGERY

## 2018-12-13 PROCEDURE — 63600175 PHARM REV CODE 636 W HCPCS: Performed by: HOSPITALIST

## 2018-12-13 PROCEDURE — 80048 BASIC METABOLIC PNL TOTAL CA: CPT

## 2018-12-13 PROCEDURE — 36000706: Performed by: SURGERY

## 2018-12-13 PROCEDURE — 27800903 OPTIME MED/SURG SUP & DEVICES OTHER IMPLANTS: Performed by: SURGERY

## 2018-12-13 PROCEDURE — 25000003 PHARM REV CODE 250: Performed by: STUDENT IN AN ORGANIZED HEALTH CARE EDUCATION/TRAINING PROGRAM

## 2018-12-13 PROCEDURE — 99232 SBSQ HOSP IP/OBS MODERATE 35: CPT | Mod: GC,,, | Performed by: HOSPITALIST

## 2018-12-13 PROCEDURE — 25000003 PHARM REV CODE 250: Performed by: SURGERY

## 2018-12-13 PROCEDURE — 37000008 HC ANESTHESIA 1ST 15 MINUTES: Performed by: SURGERY

## 2018-12-13 PROCEDURE — D9220A PRA ANESTHESIA: Mod: ANES,,, | Performed by: ANESTHESIOLOGY

## 2018-12-13 PROCEDURE — D9220A PRA ANESTHESIA: Mod: CRNA,,, | Performed by: NURSE ANESTHETIST, CERTIFIED REGISTERED

## 2018-12-13 PROCEDURE — 84100 ASSAY OF PHOSPHORUS: CPT

## 2018-12-13 PROCEDURE — 36000707: Performed by: SURGERY

## 2018-12-13 PROCEDURE — 25000003 PHARM REV CODE 250: Performed by: NURSE ANESTHETIST, CERTIFIED REGISTERED

## 2018-12-13 PROCEDURE — 43830 GSTRST OPEN WO CONSTJ TUBE: CPT | Mod: 53,,, | Performed by: SURGERY

## 2018-12-13 PROCEDURE — 36415 COLL VENOUS BLD VENIPUNCTURE: CPT

## 2018-12-13 PROCEDURE — 99232 PR SUBSEQUENT HOSPITAL CARE,LEVL II: ICD-10-PCS | Mod: GC,,, | Performed by: HOSPITALIST

## 2018-12-13 PROCEDURE — 43830 PR GASTROSTOMY,OPEN,W/O TUBE CNSTR: ICD-10-PCS | Mod: 53,,, | Performed by: SURGERY

## 2018-12-13 PROCEDURE — 71000039 HC RECOVERY, EACH ADD'L HOUR: Performed by: SURGERY

## 2018-12-13 PROCEDURE — 85025 COMPLETE CBC W/AUTO DIFF WBC: CPT

## 2018-12-13 PROCEDURE — 20600001 HC STEP DOWN PRIVATE ROOM

## 2018-12-13 PROCEDURE — 83735 ASSAY OF MAGNESIUM: CPT

## 2018-12-13 RX ORDER — LIDOCAINE HCL/PF 100 MG/5ML
SYRINGE (ML) INTRAVENOUS
Status: DISCONTINUED | OUTPATIENT
Start: 2018-12-13 | End: 2018-12-13

## 2018-12-13 RX ORDER — PROPOFOL 10 MG/ML
VIAL (ML) INTRAVENOUS
Status: DISCONTINUED | OUTPATIENT
Start: 2018-12-13 | End: 2018-12-13

## 2018-12-13 RX ORDER — FENTANYL CITRATE 50 UG/ML
INJECTION, SOLUTION INTRAMUSCULAR; INTRAVENOUS
Status: DISCONTINUED | OUTPATIENT
Start: 2018-12-13 | End: 2018-12-13

## 2018-12-13 RX ORDER — ROCURONIUM BROMIDE 10 MG/ML
INJECTION, SOLUTION INTRAVENOUS
Status: DISCONTINUED | OUTPATIENT
Start: 2018-12-13 | End: 2018-12-13

## 2018-12-13 RX ORDER — LIDOCAINE HYDROCHLORIDE 10 MG/ML
INJECTION, SOLUTION EPIDURAL; INFILTRATION; INTRACAUDAL; PERINEURAL
Status: DISCONTINUED | OUTPATIENT
Start: 2018-12-13 | End: 2018-12-13 | Stop reason: HOSPADM

## 2018-12-13 RX ORDER — LEVOTHYROXINE SODIUM ANHYDROUS 100 UG/5ML
12.5 INJECTION, POWDER, LYOPHILIZED, FOR SOLUTION INTRAVENOUS DAILY
Status: DISCONTINUED | OUTPATIENT
Start: 2018-12-14 | End: 2018-12-13

## 2018-12-13 RX ORDER — GLYCOPYRROLATE 0.2 MG/ML
INJECTION INTRAMUSCULAR; INTRAVENOUS
Status: DISCONTINUED | OUTPATIENT
Start: 2018-12-13 | End: 2018-12-13

## 2018-12-13 RX ORDER — PHENYLEPHRINE HYDROCHLORIDE 10 MG/ML
INJECTION INTRAVENOUS
Status: DISCONTINUED | OUTPATIENT
Start: 2018-12-13 | End: 2018-12-13

## 2018-12-13 RX ORDER — SODIUM CHLORIDE 9 MG/ML
INJECTION, SOLUTION INTRAVENOUS CONTINUOUS PRN
Status: DISCONTINUED | OUTPATIENT
Start: 2018-12-13 | End: 2018-12-13

## 2018-12-13 RX ORDER — PROPOFOL 10 MG/ML
VIAL (ML) INTRAVENOUS CONTINUOUS PRN
Status: DISCONTINUED | OUTPATIENT
Start: 2018-12-13 | End: 2018-12-13

## 2018-12-13 RX ADMIN — Medication 6.25 MG: at 09:12

## 2018-12-13 RX ADMIN — ASPIRIN 81 MG CHEWABLE TABLET 81 MG: 81 TABLET CHEWABLE at 09:12

## 2018-12-13 RX ADMIN — PROPOFOL 150 MCG/KG/MIN: 10 INJECTION, EMULSION INTRAVENOUS at 12:12

## 2018-12-13 RX ADMIN — FENTANYL CITRATE 25 MCG: 50 INJECTION, SOLUTION INTRAMUSCULAR; INTRAVENOUS at 12:12

## 2018-12-13 RX ADMIN — PHENYLEPHRINE HYDROCHLORIDE 200 MCG: 10 INJECTION INTRAVENOUS at 01:12

## 2018-12-13 RX ADMIN — LIDOCAINE HYDROCHLORIDE 50 MG: 20 INJECTION, SOLUTION INTRAVENOUS at 12:12

## 2018-12-13 RX ADMIN — SODIUM CHLORIDE: 9 INJECTION, SOLUTION INTRAVENOUS at 12:12

## 2018-12-13 RX ADMIN — AMPICILLIN SODIUM AND SULBACTAM SODIUM 3 G: 2; 1 INJECTION, POWDER, FOR SOLUTION INTRAMUSCULAR; INTRAVENOUS at 09:12

## 2018-12-13 RX ADMIN — ROCURONIUM BROMIDE 50 MG: 10 INJECTION, SOLUTION INTRAVENOUS at 12:12

## 2018-12-13 RX ADMIN — PROPOFOL 20 MG: 10 INJECTION, EMULSION INTRAVENOUS at 01:12

## 2018-12-13 RX ADMIN — AMPICILLIN SODIUM AND SULBACTAM SODIUM 3 G: 2; 1 INJECTION, POWDER, FOR SOLUTION INTRAMUSCULAR; INTRAVENOUS at 04:12

## 2018-12-13 RX ADMIN — GLYCOPYRROLATE 0.1 MG: 0.2 INJECTION, SOLUTION INTRAMUSCULAR; INTRAVENOUS at 12:12

## 2018-12-13 RX ADMIN — SUGAMMADEX 200 MG: 100 INJECTION, SOLUTION INTRAVENOUS at 01:12

## 2018-12-13 RX ADMIN — DONEPEZIL HYDROCHLORIDE 10 MG: 5 TABLET, FILM COATED ORAL at 09:12

## 2018-12-13 RX ADMIN — PROPOFOL 30 MG: 10 INJECTION, EMULSION INTRAVENOUS at 12:12

## 2018-12-13 RX ADMIN — AMPICILLIN SODIUM AND SULBACTAM SODIUM 3 G: 2; 1 INJECTION, POWDER, FOR SOLUTION INTRAMUSCULAR; INTRAVENOUS at 10:12

## 2018-12-13 RX ADMIN — FENTANYL CITRATE 25 MCG: 50 INJECTION, SOLUTION INTRAMUSCULAR; INTRAVENOUS at 01:12

## 2018-12-13 NOTE — PROGRESS NOTES
"[facility]  General Surgery  Progress Note    Subjective:     History of Present Illness:  Ms Ornelas is a 69 yo F transferred from [facility] for fever of unknown source requesting ID and general surgery consultations for workup of fevers and placement of PEG in a patient with extensive surgical history.    From primary team note:     She has a PMH of severe dementia (baseline status unknown), Afib (rate controlled, not on anticoagulation), HTN, HLD, GERD, and hypothyroidism.     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      History obtained form OSH paper chart review, secondary to patient's mental status.      She was hospitalized at [facility] on 11/10/18 for suspected aspiration pneumonia; status post treatment with Vanc/Zosyn. She was noted to have abnormal barium swallow at that time.   PEG tube placement again discussed with family; they declined.    Discharged back to nursing home on 11/16.      She presented again on 11/18/18 for new onset of nausea and vomiting. On presentation, labs significant for BRODY (BUN 24, Cr 2.6). CT A/P showed high-grade small bowel obstruction and bilateral lower lobe consolidations. She was started on Linezolid and Cefepime for suspected bilateral aspiration pneumonia. Her SBO was managed by general surgery with conservative measures, including NG decompression, pain medications, and IVF.  She then began developing leukocytosis to 18, with new fevers (Tmax 101) " on 11/23; 5 day course of Gentamicin was added on 11/22.     On 11/26, she was noted to be passing gas and having bowel movements, but continued to have high NG bilious outptut. Repeat CT showed resolving SBO, but continued bilateral lower lobe consolidations. On 11/27, she spiked a fever to 107 (rectal probe) and developed new onset of extremity rigidity, hyperreflexia, and fine tremor, associated with worsening leukocytosis to 28, and lactic of 3. She was assessed to have serotonin syndrome secondary vs NMS due to possible combination of Linezolid, Dilaudid, Fentanyl, and Zofran. She received 140 mg of Dantrolene for possible NMS with symptom improvement; Linezolid discontinued. Patient's antibiotics switched to vancomycin and cefepime for aspiration pneumonitis, WBC was downtrending, and both antibiotics stopped on 12/3 after completed of 6-7 day antibiotic course.  Patient noted to be alert, conversant and oriented to self and with WBC downtrending, antibiotics stopped. Step down from ICU.     Overnight on 12/4, she had resumption of fevers (Tmax 101-102) with again features again of rigidity. Mental status change declined again with minimal  verbal response; patient noted to be ill appearing and diaphoretic.  Medications reviewed with pharmacy to ensure no active medications might be precipitating Serotonin Syndrome. CT head was unremarkable. Blood and urine cultures repeated. Hoover catheter exchanged. LP performed; gram stain negative, but other analyses pending. In discussion of case, decision made to watch patient overnight and re-eval on 12/5. On 12/5, her Vancomycin and Cefepime restarted, also on Diflucan for urine culture positive for Candida.      Additionally, she was noted to have severe oropharyngeal dysphagia. She failed 2 Modified barium swallows, failed again on 12/5; OSH recommendations were for non-oral feeding. She initially had J-tube after last abdominal surgery, but fell out without attempt  on repair. GI tried to place a PEG 1 year ago; but was unable.  IR would not want to place J-tube. General surgery at [facility] declined to operate secondary to complex anatomy.      NG tube was in for bulk of hospitalization for decompression and then tube feedings, but removed for concern of nasopharyngeal erosions.           Post-Op Info:  Procedure(s) (LRB):  PEG possible Open Gastrostomy tube placement. (N/A)         Interval History:   Patient seen and examined, no acute events overnight  Has been NPO p MN  +F/BM via ostomy  Afebrile/VSS    Medications:  Continuous Infusions:  Scheduled Meds:   ampicillin-sulbactim (UNASYN) IVPB  3 g Intravenous Q6H    aspirin  81 mg Per NG tube Daily    donepezil  10 mg Per NG tube QHS    levothyroxine  25 mcg Per NG tube Daily    metoprolol  6.25 mg Per NG tube BID     PRN Meds:acetaminophen, dextrose 50%, dextrose 50%, glucagon (human recombinant), glucose, glucose, ondansetron, sodium chloride 0.9%     Review of patient's allergies indicates:   Allergen Reactions    Sulfa (sulfonamide antibiotics) Other (See Comments)     Severe reaction type unknown      Objective:     Vital Signs (Most Recent):  Temp: 98.4 °F (36.9 °C) (12/13/18 0432)  Pulse: 106 (12/13/18 0432)  Resp: 18 (12/13/18 0432)  BP: 108/71 (12/13/18 0432)  SpO2: 95 % (12/13/18 0432) Vital Signs (24h Range):  Temp:  [97.7 °F (36.5 °C)-99.9 °F (37.7 °C)] 98.4 °F (36.9 °C)  Pulse:  [] 106  Resp:  [18] 18  SpO2:  [94 %-97 %] 95 %  BP: (103-121)/(55-72) 108/71     Weight: 49.2 kg (108 lb 7.5 oz)  Body mass index is 19.21 kg/m².    Intake/Output - Last 3 Shifts       12/11 0700 - 12/12 0659 12/12 0700 - 12/13 0659 12/13 0700 - 12/14 0659    P.O.  0     I.V. (mL/kg)  0 (0)     Other 100 0     NG/GT 1280 1680     IV Piggyback  200     Total Intake(mL/kg) 1380 (25) 1880 (38.2)     Urine (mL/kg/hr) 1100 (0.8) 975 (0.8)     Stool 475 200     Total Output 1575 1175     Net -195 +705            Stool Occurrence 0  x            Physical Exam   Constitutional: She appears well-developed. No distress.   HENT:   Head: Normocephalic and atraumatic.   Cardiovascular: Normal rate and regular rhythm.   Pulmonary/Chest: Effort normal. No respiratory distress.   Abdominal:   soft, non distended, non tender. Midline laparotomy scar. G and J tube scars. LLQ ileostomy and LUQ mucous fistula?       Significant Labs:  CBC:   Recent Labs   Lab 12/13/18  0627   WBC 13.04*   RBC 2.96*   HGB 9.0*   HCT 29.1*   *   MCV 98   MCH 30.4   MCHC 30.9*     BMP:   Recent Labs   Lab 12/12/18  0417   *      K 5.6*      CO2 28   BUN 21   CREATININE 0.9   CALCIUM 9.4   MG 1.9     CMP:   Recent Labs   Lab 12/06/18  1255  12/12/18  0417   GLU 97   < > 154*   CALCIUM 9.6   < > 9.4   ALBUMIN 2.9*  --   --    PROT 7.5  --   --    *   < > 145   K 3.6   < > 5.6*   CO2 24   < > 28   *   < > 106   BUN 17   < > 21   CREATININE 0.8   < > 0.9   ALKPHOS 104  --   --    ALT 30  --   --    AST 20  --   --    BILITOT 0.4  --   --     < > = values in this interval not displayed.     LFTs:   Recent Labs   Lab 12/06/18  1255   ALT 30   AST 20   ALKPHOS 104   BILITOT 0.4   PROT 7.5   ALBUMIN 2.9*     Coagulation:   Recent Labs   Lab 12/06/18  1255   LABPROT 10.7   INR 1.0     Assessment/Plan:     Dysphagia    Ms Ornelas is a 69 yo F transferred from [facility] for fever of unknown source requesting ID and general surgery consultations for workup of fevers and placement of PEG in a patient with extensive surgical history.    - PEG, possible Open gastrostomy tube placement today   - Consent obtained from Daughter.   - has been NPO p MICHEAL Garner PA-C   [id number]  General Surgery  [facility]

## 2018-12-13 NOTE — NURSING
Spoke to ELKE Gomez, this am- states okay to give regularly scheduled asa and metoprolol with just a little water this am prior to surgery this afternoon.

## 2018-12-13 NOTE — PROGRESS NOTES
"[facility] Hospital Medicine  Progress Note    Patient Name: Silvia Ornelas  MRN: [id number]  Patient Class: IP- Inpatient   Admission Date: 12/6/2018  Length of Stay: 7 days  Attending Physician: Madison Barron MD  Primary Care Provider: [facility] Medicine Team: [facility] 5 Kelvin Mcadams MD    Subjective:     Principal Problem:Fever    HPI:  This is a 68 year old female transferred from [facility] for fever, requesting ID and general surgery consultations. She has a PMH of severe dementia (baseline status unknown), Afib (rate controlled, not on anticoagulation), HTN, HLD, GERD, and hypothyroidism. 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     History obtained form [facility] paper chart review, secondary to patient's mental status.     She was hospitalized at [facility] on 11/10/18 for suspected aspiration pneumonia; status post treatment with Vanc/Zosyn. She was noted to have abnormal barium swallow at that time; PEG tube placement again discussed with family; they declined. Discharged back to nursing home on 11/16.     She presented again on 11/18/18 for new onset of nausea and vomiting. On presentation, labs significant for BRODY (BUN 24, Cr 2.6). CT A/P showed high-grade small bowel obstruction and bilateral lower lobe consolidations. She was started on Linezolid and Cefepime for suspected bilateral aspiration pneumonia. Her SBO was managed by general surgery with conservative measures, " including NG decompression, pain medications, and IVF.  She then began developing leukocytosis to 18, with new fevers (Tmax 101) on 11/23; 5 day course of Gentamicin was added on 11/22. On 11/26, she was noted to be passing gas and having bowel movements, but continued to have high NG bilious outptut. Repeat CT showed resolving SBO, but continued bilateral lower lobe consolidations. On 11/27, she spiked a fever to 107 (rectal probe) and developed new onset of extremity rigidity, hyperreflexia, and fine tremor, associated with worsening leukocytosis to 28, and lactic of 3. She was assessed to have serotonin syndrome secondary vs NMS due to possible combination of Linezolid, Dilaudid, Fentanyl, and Zofran. She received 140 mg of Dantrolene for possible NMS with symptom improvement; Linezolid discontinued. Patient's antibiotics switched to vancomycin and cefepime for aspiration pneumonitis, WBC was downtrending, and both antibiotics stopped on 12/3 after completed of 6-7 day antibiotic course.  Patient noted to be alert, conversant and oriented to self and with WBC downtrending, antibiotics stopped. Step down from ICU.    Overnight on 12/4, she had resumption of fevers (Tmax 101-102) with again features again of rigidity. Mental status change declined again with minimal  verbal response; patient noted to be ill appearing and diaphoretic.  Medications reviewed with pharmacy to ensure no active medications might be precipitating Serotonin Syndrome. CT head was unremarkable. Blood and urine cultures repeated. Hoover catheter exchanged. LP performed; gram stain negative, but other analyses pending. In discussion of case, decision made to watch patient overnight and re-eval on 12/5. On 12/5, her Vancomycin and Cefepime restarted, also on Diflucan for urine culture positive for Candida.     Additionally, she was noted to have severe oropharyngeal dysphagia. She failed 2 Modified barium swallows, failed again on 12/5; OSH  recommendations were for non-oral feeding. She initially had J-tube after last abdominal surgery, but fell out without attempt on repair. GI tried to place a PEG 1 year ago; but was unable.  IR would not want to place J-tube. General surgery at [facility] declined to operate secondary to complex anatomy.  NG tube was in for bulk of hospitalization for decompression and then tube feedings, but removed for concern of nasopharyngeal erosions.      Hospital Course:  12/6: Admission labs notable for leukocytosis of 17, lactic of 2.4, sodium of 154. Restarted on Vanc/Zosyn. ICU consulted for hypernatremia management; recommended D5W correction based on free water deficit. Started on 14 hours of D5W at 70 mL/hr.   12/7: Overnight, patient noted to be tachycardic to 120, with temperature spike to 101.4. Unable to give Tylenol because of NPO/aspiratioin precautions; defervescence without intervention. Leukocytosis down to 13, lactic down to 1.2. Sodium improved to 151; restarted on D5W 70 mL for 10 hours. Consulted general surgery and ID for further recommendations. Per ID, discontinued all antibiotics. Ordered HIV, RPR, EEG, JÚNIOR, and RF. Ordered CT A/P: no abscess, expected post-operative anatomy changes, but showing RLL consolidation possibly aspiration pneumonia. Patient remains awake, alert, but with disordered and delayed response to questioning.   12/8: No events overnight. Afebrile. Tachycardia improved to lower 100's. Leukocytosis resolved (WBC 10). Sodium normalized to 145. NG restarted with tube feeds and some home medications. Pending surgery decision on possible PEG after they review CT findings. Consulted neurology because of concern of mental status changes reported by family over hospital course. Ordered MRI with/without contrast. Considering serotonin syndrome as possible explanation for fevers, tachycardia, mental status, and contractures on exam.   12/9: No events overnight. Afebrile. No leukocytosis. Still  borderline tachycardia. MRI unremarkable. Started on Cyproheptadine.   12/10:  Febrile overnight to 100.7. Leukocytosis returned to 14. Blood and urine CX repeated. CXR without signs of infection. Started Unasyn, based on RLL consolidation on CT A/P.   12/11: No events overnight. Afebrile, with leukocytosis unchanged (14). Day #2 of Unasyn. UA from yesterday suspicious for infection with 2+ leukocytes and 68 WBC; cultures pending. General surgery discussed PEG with family; will proceed with open gastrostomy tube on Thursday. Changed Bisoprolol to Metoprolol for tachycardia management. Cyproheptadine discontinued due to concerns of skin flushing and fever after doses given.   12/12: No events overnight. Tmax 100.3 overnight. Leukocytosis unchanged (14.4). Hyperkalemic this AM to 5.6; EKG without T-wave abnormalities. Day #3 of Unasyn. Patient more verbal today with staff. NPO at midnight for open G-tube placement tomorrow.   12/13: No events overnight. Leukocytosis slightly improved to 13. Scheduled for open G-tube placement in OR today; but procedure was unsuccessful as surgery noted the extent of adhesions in patient's abdomen completely prevents safe access to gastric wall for G-tube placement and greatly increases her risk of residual bowel perforation. Will likely now reconsider John-feeding tube placement before anticipated transfer back to [place]. Day #4 of Unasyn.     Interval History: Seen at bedside this AM. Patient appears more awake and alert after starting tube feeds. Patient to be more verbal with staff today with shouting questions; non-verbal for me. Eyes tract movement well and she follows commands.     Review of Systems   Unable to perform ROS: Patient nonverbal     Objective:     Vital Signs (Most Recent):  Temp: 98.5 °F (36.9 °C) (12/13/18 0816)  Pulse: 102 (12/13/18 0816)  Resp: 18 (12/13/18 0816)  BP: 118/80 (12/13/18 0816)  SpO2: 95 % (12/13/18 0816) Vital Signs (24h Range):  Temp:  [97.7 °F  (36.5 °C)-99.9 °F (37.7 °C)] 98.5 °F (36.9 °C)  Pulse:  [] 102  Resp:  [18] 18  SpO2:  [94 %-97 %] 95 %  BP: (103-121)/(55-80) 118/80     Weight: 49.2 kg (108 lb 7.5 oz)  Body mass index is 19.21 kg/m².    Intake/Output Summary (Last 24 hours) at 12/13/2018 0828  Last data filed at 12/13/2018 0527  Gross per 24 hour   Intake 1880 ml   Output 1175 ml   Net 705 ml      Physical Exam   Constitutional: She is oriented to person, place, and time. She appears well-developed and well-nourished. No distress.   HENT:   Head: Normocephalic and atraumatic.   Mouth/Throat: Uvula is midline, oropharynx is clear and moist and mucous membranes are normal. Abnormal dentition. No dental abscesses. No tonsillar exudate.   Eyes: Conjunctivae are normal. Pupils are equal, round, and reactive to light. No scleral icterus.   Neck: Neck supple. No JVD present.   Cardiovascular: Regular rhythm, normal heart sounds and normal pulses. Tachycardia present.   No murmur heard.  Pulmonary/Chest: Effort normal and breath sounds normal. No respiratory distress. She has no decreased breath sounds. She has no rales.   Abdominal: Soft. Normal appearance and bowel sounds are normal. She exhibits no distension. There is no tenderness.   Colostomy bag in RLQ; multiple well-healed previous abdominal surgery scars.    Genitourinary:   Genitourinary Comments: Hoover inserted.    Musculoskeletal:   There is atrophy of the muscles of the bilateral lower extremities distal to the knee.    Neurological: She is alert and oriented to person, place, and time. She displays no tremor. No cranial nerve deficit. GCS eye subscore is 4. GCS verbal subscore is 3. GCS motor subscore is 6. She displays no Babinski's sign on the right side. She displays no Babinski's sign on the left side.   There is less contracture of the bilateral feel and upper extremities at the elbow with less rigidity of movement at those joints.    Skin: Skin is warm and dry. No bruising and  no rash noted. She is not diaphoretic.   There is reduced flushing around the lower face and anterior neck.        Significant Labs:   CBC:   Recent Labs   Lab 12/12/18 0417 12/13/18  0627   WBC 14.38* 13.04*   HGB 9.8* 9.0*   HCT 31.7* 29.1*   * 380*     CMP:   Recent Labs   Lab 12/12/18  0417      K 5.6*      CO2 28   *   BUN 21   CREATININE 0.9   CALCIUM 9.4   ANIONGAP 11   EGFRNONAA >60.0       Significant Imaging: I have reviewed all pertinent imaging results/findings within the past 24 hours.  I have reviewed and interpreted all pertinent imaging results/findings within the past 24 hours.    Assessment/Plan:      * Fever    · Improving.   · Differentials here include recurrent aspiration pneumonia secondary to dysphagia, bacteremia, and UTI.  · Admitted at OSH on 11/18 for high-grade SBO with recurrent bilateral pneumonia on CT scan on 11/26 at OSH.   · Hospital course at OSH complicated by symptoms of serotonin syndrome vs NMS with fever up to 107, status post Dantrolene, and contributed to Linezolid usage partly.   · Status post multiple courses of broad spectrum coverage including Linezolid, Cefepime, and Gentamicin; restarted on 12/4 with Vancomycin, Cefepime, and Diflucan after re-developing fevers. Urine culture at OSH positive for Candida.   · PICC line placed at OSH; unclear when inserted; will attempt to contact OSH as not clear in transfer paperwork.   · Admission labs notable for leukocytosis of 17 with 80% granulocytes, and lactic of 2.9; restarted Vanc/Zosyn on 12/6.  · HIV, RPR, RF negative. JÚNIOR pending.   · CT A/P (12/7) without evidence of abdominal infection, showed expected post-operative changes, but showed possible RLL consolidation concerning for possible aspiration pneumonia vs pneumonitis.    · ID consulted given persistent fevers without source status post multiple broad spectrum antibiotics; recommending holding all antibiotics after 12/7 and re-culturing if  patient spikes fever again. Based on CT findings, have low threshold for starting Unasyn if patient decompensates.   · Febrile 12/10 to 100.7; blood cultures, UA, and CXR repeated. UA suspicious for possible infection with 3+ leukocytes and 68 WBC's; culture pending.   · Leukocytosis stable this AM at 14. Lactate 1.2 as of 12/7.  Day #4 of Unasyn for suspected aspiration PNA based on CT A/P, but also covering for suspected UA.   · Consulted neurology on 12/08 for mental status changes; considering serotonin syndrome as possible explanation for fever, vital signs, and mental status changes given medications at OSH more associated with that compared to NMS. Received cyproheptadine from 12/09-12/10.          Urinary tract infection associated with catheterization of urinary tract    See assessment for fever.        Aspiration pneumonia    See assessment for fever.        Hyperkalemia    · Resolved.   · Potassium up to 5.6 on 12/12. Improved to 4.5 today.    · Likely secondary to over-supplementation with K and phosphorous supplementation containing K.   · EKG ordered; no T wave abnormalities.   · Trending K with BMP.        Serotonin syndrome, presumed    See assessment for fever and dementia without behavioral disturbance.        Hypernatremia    · Sodium was only 145 on admission on 11/18.   · Noted to have progressively increasing Na at OSH up to 153 prior to discharge. Could be explanation for patient's acute mental status change.   · Repeat here 154.   · Evaluated by nephrology there; treated on and off with D5NS and free water, but without consistent correction.   · Resolved initially after approximately 24 hours of D5W at low maintenance.   · Na up to 140 today; giving free water TID via NG daily for correction.      Dysphagia    · Noted to have severe oropharyngeal dysphagia.   · Failed 2 Modified barium swallows, failed again on 12/5; OSH recommendations were for non-oral feeding. Most recent barium swallow  assessed as silent aspirator.   · She initially had J-tube after last abdominal surgery, but fell out without attempt on repair.   · GI tried to place a PEG 1 year ago; but was unable.   · IR would not want to place J-tube.   · General surgery at [facility] declined to operate secondary to complex anatomy.   · NG tube was in for bulk of hospitalization for decompression and then tube feedings, but removed for concern of nasopharyngeal erosions.   · Speech evaluated here; recommended strict NPO with aspiration precautions.   · Consulted dietary/nutrition: Tube feed recommendations placed for initiation. Tube feeds at goal, but will need to watch for refeeding syndrome. Mg and P with daily labs.   · General surgery evaluated patient afternoon of 12/7; after discussion with family will proceed with open PEG tube placement on 12/13. However, surgeons were unable to access gastric wall due to extent of adhesions in the patient's abdomen. Will likely proceed with [place]-feeding tube.      Hypothyroidism    · Resumed home Synthroid.        Atrial fibrillation    · Rate controlled with Bisoprolol at home per outside records.   · CHADVASC score of 3; on no anticoagulation.   · Home Bisoprolol changed to Metoprolol BID for better tachycardia control.            Dementia without behavioral disturbance    · Baseline status unknown. Upper level spoke with daughter Terri (phone number on sticky note). She states that since 2016 her mother's mental status has been declining she used to be able to do all her ADLs until she started having her abdominal surgeries due to a volvulus.  She was placed in a nursing home in 2016. In the last 4 months she has been less verbal and presented to the OSH for a chocking episode which lead to a cardiac arrest.  · Patient was noted to be awake, alert, and conversing throughout hospital stay at OSH after resolution of possible serotonin syndrome in ICU.   · Noted to have mental status change associated  with minimal verbal response after redevelopment of fevers. CT head and LP at OSH unremarkable.   · Noted to be awake and alert, but with disordered response to questions on admission, no change today.   · Cannot resume home dementia medications with aspiration precautions.   · Neurology consulted on 12/8 for mental status changes/worsening dementia; per recommendations, ordered MRI brain with/without contrast which did not show any intracranial abnormalities   · Discussed with neurology; mentioned possibility of serotonin syndrome residual as explanation for vitals, neurological exam, and mental status changes, decided to start on cryptoheptadine 4 mg TID on 12/09 with minimal improvement. Cyproheptadine discontinued on 12/11 due to reported increased skin flushing and temperature spikes after dose.   · Resumed home Donepezil on 12/11 per neurology recs. Neurology signed off.            GERD (gastroesophageal reflux disease)    · Holding home PPI with aspiration precautions.        HLD (hyperlipidemia)    · No prior lipid panel in system.   · Holding home Atorvastatin.       Essential hypertension    · Controlled.   · Holding home BP medications as she is hypotensive  · Watch BP with Cyproheptadine   · Vitals ordered Q4H.          VTE Risk Mitigation (From admission, onward)        Ordered     Place sequential compression device  Until discontinued      12/06/18 1138     IP VTE LOW RISK PATIENT  Once      12/06/18 1138              Kelivn Mcadams MD  Department of Hospital Medicine   [facility]

## 2018-12-13 NOTE — ASSESSMENT & PLAN NOTE
Ms Ornelas is a 67 yo F transferred from [facility] for fever of unknown source requesting ID and general surgery consultations for workup of fevers and placement of PEG in a patient with extensive surgical history.    - PEG, possible Open gastrostomy tube placement today   - Consent obtained from Daughter.   - has been NPO p MN

## 2018-12-13 NOTE — BRIEF OP NOTE
[facility]  Brief Operative Note    SUMMARY     Surgery Date: 12/13/2018     Surgeon(s) and Role:     * Anthony Byrne MD - Primary     * Delia Rome MD - Resident - Assisting        Pre-op Diagnosis:  Oral phase dysphagia [R13.11]    Post-op Diagnosis:  Post-Op Diagnosis Codes:     * Oral phase dysphagia [R13.11]    Procedure(s) (LRB):  aborted PEG insertion: abdominal wound exploration (N/A)  EGD (ESOPHAGOGASTRODUODENOSCOPY) (N/A)    Anesthesia: General    Description of Procedure: Attempted PEG, attempted open Gastrostomy tube.     Description of the findings of the procedure: Too much scar tissue found to do gastrostomy tube safely.     Estimated Blood Loss: * No values recorded between 12/13/2018 12:32 PM and 12/13/2018  1:30 PM *         Specimens:   Specimen (12h ago, onward)    None

## 2018-12-13 NOTE — CARE UPDATE
Spoke to Terri and her brother ( the children of Ms Ornelas ) at bedside.  They stated that their mother seems to be doing much better. To them her mental status is at baseline the one thing that is different is her inability to swallow and feed herself. They understood that the PEG placement was difficult and that general surgery attempted but were unsuccessful due to the massive amount of adhesions. We discussed at length the options she has and they would like to continue NGT for longer in hopes that she can participate with speech for swallow evaluation in the future. I did explain that she needs a couple more days of antibiotics until 12/16  for her aspiration pneumonia. Daughter would like to pursue transfer back to [place] as its closer to home. Will discuss with Team and CM tomorrow and try to submit for [place] to take patient for feeding/ aspiration treatment / placement management          Jeaneth Boyd MD, MPH  Internal Medicine PGY2  [address] [place] [address]

## 2018-12-13 NOTE — NURSING TRANSFER
Nursing Transfer Note      12/13/2018     Transfer To: [id number]    Transfer via stretcher    Transfer with cardiac monitoring    Transported by pct    Medicines sent: none    Chart send with patient: Yes    Notified: daughter    Patient reassessed at: 12/13/18 6618

## 2018-12-13 NOTE — PLAN OF CARE
Problem: Patient Care Overview  Goal: Plan of Care Review  Outcome: Ongoing (interventions implemented as appropriate)  Turned Q 2. Orders for PEG tube placement tomorrow. Tolerating feeds well. NPO and tube feeds off at midnight.

## 2018-12-13 NOTE — ASSESSMENT & PLAN NOTE
· Sodium was only 145 on admission on 11/18.   · Noted to have progressively increasing Na at OSH up to 153 prior to discharge. Could be explanation for patient's acute mental status change.   · Repeat here 154.   · Evaluated by nephrology there; treated on and off with D5NS and free water, but without consistent correction.   · Resolved initially after approximately 24 hours of D5W at low maintenance.   · Na up to 140 today; giving free water TID via NG daily for correction.

## 2018-12-13 NOTE — ASSESSMENT & PLAN NOTE
· Improving.   · Differentials here include recurrent aspiration pneumonia secondary to dysphagia, bacteremia, and UTI.  · Admitted at OSH on 11/18 for high-grade SBO with recurrent bilateral pneumonia on CT scan on 11/26 at OSH.   · Hospital course at OSH complicated by symptoms of serotonin syndrome vs NMS with fever up to 107, status post Dantrolene, and contributed to Linezolid usage partly.   · Status post multiple courses of broad spectrum coverage including Linezolid, Cefepime, and Gentamicin; restarted on 12/4 with Vancomycin, Cefepime, and Diflucan after re-developing fevers. Urine culture at OSH positive for Candida.   · PICC line placed at OSH; unclear when inserted; will attempt to contact OSH as not clear in transfer paperwork.   · Admission labs notable for leukocytosis of 17 with 80% granulocytes, and lactic of 2.9; restarted Vanc/Zosyn on 12/6.  · HIV, RPR, RF negative. JÚNIOR pending.   · CT A/P (12/7) without evidence of abdominal infection, showed expected post-operative changes, but showed possible RLL consolidation concerning for possible aspiration pneumonia vs pneumonitis.    · ID consulted given persistent fevers without source status post multiple broad spectrum antibiotics; recommending holding all antibiotics after 12/7 and re-culturing if patient spikes fever again. Based on CT findings, have low threshold for starting Unasyn if patient decompensates.   · Febrile 12/10 to 100.7; blood cultures, UA, and CXR repeated. UA suspicious for possible infection with 3+ leukocytes and 68 WBC's; culture pending.   · Leukocytosis stable this AM at 14. Lactate 1.2 as of 12/7.  Day #4 of Unasyn for suspected aspiration PNA based on CT A/P, but also covering for suspected UA.   · Consulted neurology on 12/08 for mental status changes; considering serotonin syndrome as possible explanation for fever, vital signs, and mental status changes given medications at OSH more associated with that compared to NMS.  Received cyproheptadine from 12/09-12/10.

## 2018-12-13 NOTE — PROGRESS NOTES
Report received from Jayme Crandall RN; Pt placed on bedside telemetry monitor pre op 3; portable tele box brought to PACU. Daugher at bs; no needs voiced.

## 2018-12-13 NOTE — ASSESSMENT & PLAN NOTE
· Noted to have severe oropharyngeal dysphagia.   · Failed 2 Modified barium swallows, failed again on 12/5; OSH recommendations were for non-oral feeding. Most recent barium swallow assessed as silent aspirator.   · She initially had J-tube after last abdominal surgery, but fell out without attempt on repair.   · GI tried to place a PEG 1 year ago; but was unable.   · IR would not want to place J-tube.   · General surgery at [facility] declined to operate secondary to complex anatomy.   · NG tube was in for bulk of hospitalization for decompression and then tube feedings, but removed for concern of nasopharyngeal erosions.   · Speech evaluated here; recommended strict NPO with aspiration precautions.   · Consulted dietary/nutrition: Tube feed recommendations placed for initiation. Tube feeds at goal, but will need to watch for refeeding syndrome. Mg and P with daily labs.   · General surgery evaluated patient afternoon of 12/7; after discussion with family will proceed with open PEG tube placement on 12/13. However, surgeons were unable to access gastric wall due to extent of adhesions in the patient's abdomen. Will likely proceed with John-feeding tube.

## 2018-12-13 NOTE — TRANSFER OF CARE
"Anesthesia Transfer of Care Note    Patient: Silvia Ornelas    Procedure(s) Performed: Procedure(s) (LRB):  aborted PEG insertion: abdominal wound exploration (N/A)  EGD (ESOPHAGOGASTRODUODENOSCOPY) (N/A)    Patient location: PACU    Anesthesia Type: general    Transport from OR: Transported from OR on 6-10 L/min O2 by face mask with adequate spontaneous ventilation    Post pain: adequate analgesia    Post assessment: tolerated procedure well and no apparent anesthetic complications    Post vital signs: stable    Level of consciousness: awake    Nausea/Vomiting: no nausea/vomiting    Complications: none    Transfer of care protocol was followed      Last vitals:   Visit Vitals  /60   Pulse 90   Temp 36.4 °C (97.5 °F) (Temporal)   Resp 15   Ht 5' 3" (1.6 m)   Wt 49.2 kg (108 lb 7.5 oz)   SpO2 100%   Breastfeeding? No   BMI 19.21 kg/m²     "

## 2018-12-13 NOTE — SUBJECTIVE & OBJECTIVE
Interval History: Seen at bedside this AM. Patient appears more awake and alert after starting tube feeds. Patient to be more verbal with staff today with shouting questions; non-verbal for me. Eyes tract movement well and she follows commands.     Review of Systems   Unable to perform ROS: Patient nonverbal     Objective:     Vital Signs (Most Recent):  Temp: 98.5 °F (36.9 °C) (12/13/18 0816)  Pulse: 102 (12/13/18 0816)  Resp: 18 (12/13/18 0816)  BP: 118/80 (12/13/18 0816)  SpO2: 95 % (12/13/18 0816) Vital Signs (24h Range):  Temp:  [97.7 °F (36.5 °C)-99.9 °F (37.7 °C)] 98.5 °F (36.9 °C)  Pulse:  [] 102  Resp:  [18] 18  SpO2:  [94 %-97 %] 95 %  BP: (103-121)/(55-80) 118/80     Weight: 49.2 kg (108 lb 7.5 oz)  Body mass index is 19.21 kg/m².    Intake/Output Summary (Last 24 hours) at 12/13/2018 0828  Last data filed at 12/13/2018 0527  Gross per 24 hour   Intake 1880 ml   Output 1175 ml   Net 705 ml      Physical Exam   Constitutional: She is oriented to person, place, and time. She appears well-developed and well-nourished. No distress.   HENT:   Head: Normocephalic and atraumatic.   Mouth/Throat: Uvula is midline, oropharynx is clear and moist and mucous membranes are normal. Abnormal dentition. No dental abscesses. No tonsillar exudate.   Eyes: Conjunctivae are normal. Pupils are equal, round, and reactive to light. No scleral icterus.   Neck: Neck supple. No JVD present.   Cardiovascular: Regular rhythm, normal heart sounds and normal pulses. Tachycardia present.   No murmur heard.  Pulmonary/Chest: Effort normal and breath sounds normal. No respiratory distress. She has no decreased breath sounds. She has no rales.   Abdominal: Soft. Normal appearance and bowel sounds are normal. She exhibits no distension. There is no tenderness.   Colostomy bag in RLQ; multiple well-healed previous abdominal surgery scars.    Genitourinary:   Genitourinary Comments: Hoover inserted.    Musculoskeletal:   There is  atrophy of the muscles of the bilateral lower extremities distal to the knee.    Neurological: She is alert and oriented to person, place, and time. She displays no tremor. No cranial nerve deficit. GCS eye subscore is 4. GCS verbal subscore is 3. GCS motor subscore is 6. She displays no Babinski's sign on the right side. She displays no Babinski's sign on the left side.   There is less contracture of the bilateral feel and upper extremities at the elbow with less rigidity of movement at those joints.    Skin: Skin is warm and dry. No bruising and no rash noted. She is not diaphoretic.   There is reduced flushing around the lower face and anterior neck.        Significant Labs:   CBC:   Recent Labs   Lab 12/12/18 0417 12/13/18  0627   WBC 14.38* 13.04*   HGB 9.8* 9.0*   HCT 31.7* 29.1*   * 380*     CMP:   Recent Labs   Lab 12/12/18 0417      K 5.6*      CO2 28   *   BUN 21   CREATININE 0.9   CALCIUM 9.4   ANIONGAP 11   EGFRNONAA >60.0       Significant Imaging: I have reviewed all pertinent imaging results/findings within the past 24 hours.  I have reviewed and interpreted all pertinent imaging results/findings within the past 24 hours.

## 2018-12-13 NOTE — ASSESSMENT & PLAN NOTE
· Baseline status unknown. Upper level spoke with daughter Terri (phone number on sticky note). She states that since 2016 her mother's mental status has been declining she used to be able to do all her ADLs until she started having her abdominal surgeries due to a volvulus.  She was placed in a nursing home in 2016. In the last 4 months she has been less verbal and presented to the OSH for a chocking episode which lead to a cardiac arrest.  · Patient was noted to be awake, alert, and conversing throughout hospital stay at OSH after resolution of possible serotonin syndrome in ICU.   · Noted to have mental status change associated with minimal verbal response after redevelopment of fevers. CT head and LP at OSH unremarkable.   · Noted to be awake and alert, but with disordered response to questions on admission, no change today.   · Cannot resume home dementia medications with aspiration precautions.   · Neurology consulted on 12/8 for mental status changes/worsening dementia; per recommendations, ordered MRI brain with/without contrast which did not show any intracranial abnormalities   · Discussed with neurology; mentioned possibility of serotonin syndrome residual as explanation for vitals, neurological exam, and mental status changes, decided to start on cryptoheptadine 4 mg TID on 12/09 with minimal improvement. Cyproheptadine discontinued on 12/11 due to reported increased skin flushing and temperature spikes after dose.   · Resumed home Donepezil on 12/11 per neurology recs. Neurology signed off.

## 2018-12-13 NOTE — ANESTHESIA PREPROCEDURE EVALUATION
12/13/2018  Silvia Ornelas is a 68 y.o., female.  Pre-operative evaluation for Procedure(s) (LRB):  PEG possible Open Gastrostomy tube placement. (N/A)    Silvia Ornelas is a 68 y.o. female with PMHx of dementia, HTN, hypothyroidism, afib, GERD, previous volvulus 2014, subsequent perforation, then partial transverse, descending colectomy 2016, currently has fever of unknown origin, treated for aspiration pna abx included linezolid concern this admission for serotonin syndrome vs NMS dantrolene given. Patient has persistent dysphagia.     Patient presents for the above procedure.    Currently patient potassium 5.6. Am labs not resulted yet      LDA:   Right brachial PICC  NG    Prev airway: None documented    Drips: None    Patient Active Problem List   Diagnosis    Fever    Essential hypertension    HLD (hyperlipidemia)    GERD (gastroesophageal reflux disease)    Dementia without behavioral disturbance    Atrial fibrillation    Hypothyroidism    Dysphagia    Hypernatremia    Serotonin syndrome, presumed    Generalized hyperreflexia    Hyperkalemia    Aspiration pneumonia    Urinary tract infection associated with catheterization of urinary tract       Review of patient's allergies indicates:   Allergen Reactions    Sulfa (sulfonamide antibiotics) Other (See Comments)     Severe reaction type unknown         No current facility-administered medications on file prior to encounter.      Current Outpatient Medications on File Prior to Encounter   Medication Sig Dispense Refill    ascorbic acid, vitamin C, (VITAMIN C) 500 MG tablet Take 500 mg by mouth once daily.      aspirin 81 MG Chew Take 81 mg by mouth once daily.       atorvastatin (LIPITOR) tablet Take 5 mg by mouth once daily.       bisoprolol (ZEBETA) 5 MG tablet Take 2.5 mg by mouth 2 (two) times daily.       clomiPRAMINE (ANAFRANIL)  50 mg capsule Take 50 mg by mouth every morning and 200 mg at bedtime      clonazePAM (KLONOPIN) 1 MG tablet Take 1 mg by mouth 3 (three) times daily.      donepezil (ARICEPT) 10 MG tablet Take 10 mg by mouth every evening.      levothyroxine (SYNTHROID) 25 MCG tablet Take 25 mcg by mouth once daily.      mirtazapine (REMERON) 30 MG tablet Take 30 mg by mouth every evening.      multivitamin (ONE DAILY MULTIVITAMIN) per tablet Take 1 tablet by mouth once daily.      pantoprazole (PROTONIX) 40 MG tablet Take 40 mg by mouth once daily.      risperiDONE (RISPERDAL) 1 MG tablet Take 1 mg by mouth 2 (two) times daily.      rivastigmine (EXELON) 9.5 mg/24 hr PT24 Place 1 patch onto the skin once daily.         Past Surgical History:   Procedure Laterality Date    ABDOMINAL SURGERY      EXPLORATORY LAPAROTOMY W/ BOWEL RESECTION      ILEOSTOMY      PARTIAL GASTRECTOMY      SPLENECTOMY, TOTAL         Social History     Socioeconomic History    Marital status:      Spouse name: Not on file    Number of children: Not on file    Years of education: Not on file    Highest education level: Not on file   Social Needs    Financial resource strain: Not on file    Food insecurity - worry: Not on file    Food insecurity - inability: Not on file    Transportation needs - medical: Not on file    Transportation needs - non-medical: Not on file   Occupational History    Not on file   Tobacco Use    Smoking status: Never Smoker    Smokeless tobacco: Never Used   Substance and Sexual Activity    Alcohol use: No     Frequency: Never    Drug use: No    Sexual activity: Not Currently   Other Topics Concern    Not on file   Social History Narrative    Not on file         Vital Signs Range (Last 24H):  Temp:  [36.5 °C (97.7 °F)-37.7 °C (99.9 °F)]   Pulse:  []   Resp:  [18]   BP: (103-121)/(55-72)   SpO2:  [94 %-97 %]       CBC:   Lab Results   Component Value Date    WBC 13.04 (H) 12/13/2018    HGB 9.0  (L) 12/13/2018    HCT 29.1 (L) 12/13/2018    MCV 98 12/13/2018     (H) 12/13/2018       CMP:     Chemistry        Component Value Date/Time     12/13/2018 0627    K 4.6 12/13/2018 0627     12/13/2018 0627    CO2 26 12/13/2018 0627    BUN 21 12/13/2018 0627    CREATININE 0.8 12/13/2018 0627     (H) 12/13/2018 0627        Component Value Date/Time    CALCIUM 9.2 12/13/2018 0627    ALKPHOS 104 12/06/2018 1255    AST 20 12/06/2018 1255    ALT 30 12/06/2018 1255    BILITOT 0.4 12/06/2018 1255    ESTGFRAFRICA >60.0 12/13/2018 0627    EGFRNONAA >60.0 12/13/2018 0627            No results for input(s): PT, INR, PROTIME, APTT in the last 72 hours.        Diagnostic Studies: CXR  FINDINGS:  Central line mid SVC NG tip fundus.  Heart is normal lungs are clear and the bones and bowel gas are noncontributory.      Impression       See above      Electronically signed by: Yovani Garcia MD  Date: 12/10/2018  Time: 08:11         EKG:  Vent. Rate : 108 BPM     Atrial Rate : 108 BPM     P-R Int : 124 ms          QRS Dur : 066 ms      QT Int : 340 ms       P-R-T Axes : 046 -05 044 degrees     QTc Int : 455 ms    Sinus tachycardia  Right atrial enlargement  Moderate voltage criteria for LVH, may be normal variant  Borderline Abnormal ECG  When compared with ECG of 06-DEC-2018 12:04,  No significant change was found  Confirmed by Dhruv HOLT MD, Caio PRECIADO ([id number]) on 12/12/2018 4:33:58 PM    2D Echo: (12/7/18)  · Concentric left ventricular remodeling.  · Normal left ventricular systolic function. The estimated ejection fraction is 55%  · Normal LV diastolic function.  · Mild mitral sclerosis.  · Normal right ventricular systolic function.  · Normal atrial size.           Anesthesia Evaluation    I have reviewed the Patient Summary Reports.    I have reviewed the Nursing Notes.   I have reviewed the Medications.     Review of Systems  Anesthesia Hx:  No problems with previous Anesthesia   Denies Personal Hx of  Anesthesia complications.   Social:  Non-Smoker    Hematology/Oncology:     Oncology Normal     Cardiovascular:   Hypertension    Pulmonary:   Pneumonia Denies Shortness of breath.    Renal/:  Renal/ Normal     Hepatic/GI:   GERD    Neurological:  Dementia    Endocrine:   Hypothyroidism    Psych:   Psychiatric History (concern for dementia)          Physical Exam  General:  Well nourished    Airway/Jaw/Neck:  Airway Findings: Mouth Opening: < 3 cm Tongue: Normal  General Airway Assessment: Adult  TM Distance: 4 - 6 cm  Jaw/Neck Findings:  Neck ROM: Decreased Lateral Motion, to the right, Extension Decreased, Mild     Eyes/Ears/Nose:NG in place   Dental:  Dental Findings: molar caps   Chest/Lungs:  Chest/Lungs Findings: Clear to auscultation     Heart/Vascular:  Heart Findings: Rate: Tachycardia  Rhythm: Regular Rhythm  Heart murmur: negative    Abdomen:  Abdomen Findings:  Soft, Nontender      Skin:  Skin Findings: Normal    Mental Status:  Mental Status Findings:  Confusion, Flat Affect         Anesthesia Plan  Type of Anesthesia, risks & benefits discussed:  Anesthesia Type:  general, MAC  Patient's Preference:   Intra-op Monitoring Plan: standard ASA monitors  Intra-op Monitoring Plan Comments:   Post Op Pain Control Plan: multimodal analgesia, per primary service following discharge from PACU and IV/PO Opioids PRN  Post Op Pain Control Plan Comments:   Induction:   IV  Beta Blocker:  Patient is on a Beta-Blocker and has received one dose within the past 24 hours (No further documentation required).       Informed Consent: Patient representative understands risks and agrees with Anesthesia plan.  Questions answered. Anesthesia consent signed with patient representative.  ASA Score: 3     Day of Surgery Review of History & Physical:            Ready For Surgery From Anesthesia Perspective.

## 2018-12-13 NOTE — OP NOTE
DATE OF PROCEDURE:  12/13/2018    PREOPERATIVE DIAGNOSIS:  Dysphagia.    POSTOPERATIVE DIAGNOSIS:  Dysphagia.    PROCEDURES PERFORMED:  1.  EGD (upper endoscopy).  2.  Abdominal wall exploration with aborted attempt at feeding tube access.    SURGEON:  Anthony Byrne M.D.    ASSISTANT:  Delia Worrell M.D. (RES)    ANESTHESIA:  General.    CLINICAL NOTE:  The patient is a 68-year-old patient who has dysphagia from   unknown etiology.  She has had multiple numerous abdominal surgeries and is   currently being fed through nasogastric feeding.  She is brought to the   Operating Room for placement of a G-tube.    PROCEDURE IN DETAIL:  Following intravenous sedation, the oral endoscope was   passed down through the cricopharyngeus and into the esophagus.  We traversed   the normal esophagus and entered the stomach, which is of minimal size due to   previous resection and insufflated it thoroughly.  We could not with   transillumination bring the light to the anterior abdominal wall and could not   find an adequate entry point for an attempted percutaneous gastrostomy.  We   therefore removed the scope.    General endotracheal intubation then occurred via anesthesia and we prepped and   draped the abdomen.  We made an upper midline incision and dissected down.  The   patient had a large hernia sac with massive adhesions of bowel, which were   extremely dense and no anatomic planes were available.  I felt that to proceed   in order to attempt to find either the stomach or a proximal jejunal limb would   be fraught with risks including bowel injury and subsequent fistula.  I   therefore aborted the procedure and we closed the abdomen with Vicryls to the   subcutaneous tissue and skin with staples.  Sterile dressings were applied and   the procedure was terminated with the patient tolerating it well.    ESTIMATED BLOOD LOSS:  5 mL.      MCT/IN  dd: 12/13/2018 13:23:37 (CST)  td: 12/13/2018 14:46:24 (CST)  Doc  ID   #[id number]  Job ID #[id number]    CC:

## 2018-12-13 NOTE — ANESTHESIA POSTPROCEDURE EVALUATION
"Anesthesia Post Evaluation    Patient: Silvia Ornelas    Procedure(s) Performed: Procedure(s) (LRB):  aborted PEG insertion: abdominal wound exploration (N/A)  EGD (ESOPHAGOGASTRODUODENOSCOPY) (N/A)    Final Anesthesia Type: general  Patient location during evaluation: PACU  Patient participation: Yes- Able to Participate  Level of consciousness: awake  Post-procedure vital signs: reviewed and stable  Pain management: adequate  Airway patency: patent  PONV status at discharge: No PONV  Anesthetic complications: no      Cardiovascular status: blood pressure returned to baseline and hemodynamically stable  Respiratory status: unassisted and spontaneous ventilation  Hydration status: euvolemic  Follow-up not needed.        Visit Vitals  /62   Pulse 86   Temp 36.4 °C (97.5 °F) (Temporal)   Resp 16   Ht 5' 3" (1.6 m)   Wt 49.2 kg (108 lb 7.5 oz)   SpO2 98%   Breastfeeding? No   BMI 19.21 kg/m²       Pain/Thuan Score: No Data Recorded      "

## 2018-12-13 NOTE — SUBJECTIVE & OBJECTIVE
Interval History:   Patient seen and examined, no acute events overnight  Has been NPO p MN  +F/BM via ostomy  Afebrile/VSS    Medications:  Continuous Infusions:  Scheduled Meds:   ampicillin-sulbactim (UNASYN) IVPB  3 g Intravenous Q6H    aspirin  81 mg Per NG tube Daily    donepezil  10 mg Per NG tube QHS    levothyroxine  25 mcg Per NG tube Daily    metoprolol  6.25 mg Per NG tube BID     PRN Meds:acetaminophen, dextrose 50%, dextrose 50%, glucagon (human recombinant), glucose, glucose, ondansetron, sodium chloride 0.9%     Review of patient's allergies indicates:   Allergen Reactions    Sulfa (sulfonamide antibiotics) Other (See Comments)     Severe reaction type unknown      Objective:     Vital Signs (Most Recent):  Temp: 98.4 °F (36.9 °C) (12/13/18 0432)  Pulse: 106 (12/13/18 0432)  Resp: 18 (12/13/18 0432)  BP: 108/71 (12/13/18 0432)  SpO2: 95 % (12/13/18 0432) Vital Signs (24h Range):  Temp:  [97.7 °F (36.5 °C)-99.9 °F (37.7 °C)] 98.4 °F (36.9 °C)  Pulse:  [] 106  Resp:  [18] 18  SpO2:  [94 %-97 %] 95 %  BP: (103-121)/(55-72) 108/71     Weight: 49.2 kg (108 lb 7.5 oz)  Body mass index is 19.21 kg/m².    Intake/Output - Last 3 Shifts       12/11 0700 - 12/12 0659 12/12 0700 - 12/13 0659 12/13 0700 - 12/14 0659    P.O.  0     I.V. (mL/kg)  0 (0)     Other 100 0     NG/GT 1280 1680     IV Piggyback  200     Total Intake(mL/kg) 1380 (25) 1880 (38.2)     Urine (mL/kg/hr) 1100 (0.8) 975 (0.8)     Stool 475 200     Total Output 1575 1175     Net -195 +705            Stool Occurrence 0 x            Physical Exam   Constitutional: She appears well-developed. No distress.   HENT:   Head: Normocephalic and atraumatic.   Cardiovascular: Normal rate and regular rhythm.   Pulmonary/Chest: Effort normal. No respiratory distress.   Abdominal:   soft, non distended, non tender. Midline laparotomy scar. G and J tube scars. LLQ ileostomy and LUQ mucous fistula?       Significant Labs:  CBC:   Recent Labs   Lab  12/13/18  0627   WBC 13.04*   RBC 2.96*   HGB 9.0*   HCT 29.1*   *   MCV 98   MCH 30.4   MCHC 30.9*     BMP:   Recent Labs   Lab 12/12/18  0417   *      K 5.6*      CO2 28   BUN 21   CREATININE 0.9   CALCIUM 9.4   MG 1.9     CMP:   Recent Labs   Lab 12/06/18  1255  12/12/18  0417   GLU 97   < > 154*   CALCIUM 9.6   < > 9.4   ALBUMIN 2.9*  --   --    PROT 7.5  --   --    *   < > 145   K 3.6   < > 5.6*   CO2 24   < > 28   *   < > 106   BUN 17   < > 21   CREATININE 0.8   < > 0.9   ALKPHOS 104  --   --    ALT 30  --   --    AST 20  --   --    BILITOT 0.4  --   --     < > = values in this interval not displayed.     LFTs:   Recent Labs   Lab 12/06/18  1255   ALT 30   AST 20   ALKPHOS 104   BILITOT 0.4   PROT 7.5   ALBUMIN 2.9*     Coagulation:   Recent Labs   Lab 12/06/18  1255   LABPROT 10.7   INR 1.0

## 2018-12-13 NOTE — ASSESSMENT & PLAN NOTE
· Resolved.   · Potassium up to 5.6 on 12/12. Improved to 4.5 today.    · Likely secondary to over-supplementation with K and phosphorous supplementation containing K.   · EKG ordered; no T wave abnormalities.   · Trending K with BMP.

## 2018-12-14 LAB
ANION GAP SERPL CALC-SCNC: 12 MMOL/L
BASOPHILS # BLD AUTO: 0.03 K/UL
BASOPHILS NFR BLD: 0.3 %
BUN SERPL-MCNC: 19 MG/DL
CALCIUM SERPL-MCNC: 9.1 MG/DL
CHLORIDE SERPL-SCNC: 101 MMOL/L
CO2 SERPL-SCNC: 25 MMOL/L
CREAT SERPL-MCNC: 0.8 MG/DL
DIFFERENTIAL METHOD: ABNORMAL
EOSINOPHIL # BLD AUTO: 0 K/UL
EOSINOPHIL NFR BLD: 0.3 %
ERYTHROCYTE [DISTWIDTH] IN BLOOD BY AUTOMATED COUNT: 18.4 %
EST. GFR  (AFRICAN AMERICAN): >60 ML/MIN/1.73 M^2
EST. GFR  (NON AFRICAN AMERICAN): >60 ML/MIN/1.73 M^2
GLUCOSE SERPL-MCNC: 93 MG/DL
HCT VFR BLD AUTO: 28.2 %
HGB BLD-MCNC: 8.9 G/DL
IMM GRANULOCYTES # BLD AUTO: 0.1 K/UL
IMM GRANULOCYTES NFR BLD AUTO: 0.8 %
LYMPHOCYTES # BLD AUTO: 2.2 K/UL
LYMPHOCYTES NFR BLD: 18.6 %
MAGNESIUM SERPL-MCNC: 1.5 MG/DL
MCH RBC QN AUTO: 31.3 PG
MCHC RBC AUTO-ENTMCNC: 31.6 G/DL
MCV RBC AUTO: 99 FL
MONOCYTES # BLD AUTO: 1.5 K/UL
MONOCYTES NFR BLD: 12.8 %
NEUTROPHILS # BLD AUTO: 8 K/UL
NEUTROPHILS NFR BLD: 67.2 %
NRBC BLD-RTO: 0 /100 WBC
PHOSPHATE SERPL-MCNC: 3.5 MG/DL
PLATELET # BLD AUTO: 375 K/UL
PMV BLD AUTO: 12.7 FL
POTASSIUM SERPL-SCNC: 4.6 MMOL/L
RBC # BLD AUTO: 2.84 M/UL
SODIUM SERPL-SCNC: 138 MMOL/L
WBC # BLD AUTO: 11.92 K/UL

## 2018-12-14 PROCEDURE — 63600175 PHARM REV CODE 636 W HCPCS: Performed by: HOSPITALIST

## 2018-12-14 PROCEDURE — 20600001 HC STEP DOWN PRIVATE ROOM

## 2018-12-14 PROCEDURE — 25000003 PHARM REV CODE 250: Performed by: STUDENT IN AN ORGANIZED HEALTH CARE EDUCATION/TRAINING PROGRAM

## 2018-12-14 PROCEDURE — 83735 ASSAY OF MAGNESIUM: CPT

## 2018-12-14 PROCEDURE — 80048 BASIC METABOLIC PNL TOTAL CA: CPT

## 2018-12-14 PROCEDURE — 99232 SBSQ HOSP IP/OBS MODERATE 35: CPT | Mod: GC,,, | Performed by: HOSPITALIST

## 2018-12-14 PROCEDURE — 85025 COMPLETE CBC W/AUTO DIFF WBC: CPT

## 2018-12-14 PROCEDURE — 63600175 PHARM REV CODE 636 W HCPCS: Performed by: STUDENT IN AN ORGANIZED HEALTH CARE EDUCATION/TRAINING PROGRAM

## 2018-12-14 PROCEDURE — 84100 ASSAY OF PHOSPHORUS: CPT

## 2018-12-14 PROCEDURE — 36415 COLL VENOUS BLD VENIPUNCTURE: CPT

## 2018-12-14 PROCEDURE — 99232 PR SUBSEQUENT HOSPITAL CARE,LEVL II: ICD-10-PCS | Mod: GC,,, | Performed by: HOSPITALIST

## 2018-12-14 PROCEDURE — 25000003 PHARM REV CODE 250: Performed by: HOSPITALIST

## 2018-12-14 RX ORDER — MAGNESIUM SULFATE HEPTAHYDRATE 40 MG/ML
2 INJECTION, SOLUTION INTRAVENOUS ONCE
Status: COMPLETED | OUTPATIENT
Start: 2018-12-14 | End: 2018-12-14

## 2018-12-14 RX ORDER — ENOXAPARIN SODIUM 100 MG/ML
40 INJECTION SUBCUTANEOUS EVERY 24 HOURS
Status: DISCONTINUED | OUTPATIENT
Start: 2018-12-14 | End: 2018-12-18 | Stop reason: HOSPADM

## 2018-12-14 RX ADMIN — DONEPEZIL HYDROCHLORIDE 10 MG: 5 TABLET, FILM COATED ORAL at 09:12

## 2018-12-14 RX ADMIN — ASPIRIN 81 MG CHEWABLE TABLET 81 MG: 81 TABLET CHEWABLE at 05:12

## 2018-12-14 RX ADMIN — AMPICILLIN SODIUM AND SULBACTAM SODIUM 3 G: 2; 1 INJECTION, POWDER, FOR SOLUTION INTRAMUSCULAR; INTRAVENOUS at 09:12

## 2018-12-14 RX ADMIN — MAGNESIUM SULFATE IN WATER 2 G: 40 INJECTION, SOLUTION INTRAVENOUS at 09:12

## 2018-12-14 RX ADMIN — ENOXAPARIN SODIUM 40 MG: 100 INJECTION SUBCUTANEOUS at 05:12

## 2018-12-14 RX ADMIN — AMPICILLIN SODIUM AND SULBACTAM SODIUM 3 G: 2; 1 INJECTION, POWDER, FOR SOLUTION INTRAMUSCULAR; INTRAVENOUS at 10:12

## 2018-12-14 RX ADMIN — AMPICILLIN SODIUM AND SULBACTAM SODIUM 3 G: 2; 1 INJECTION, POWDER, FOR SOLUTION INTRAMUSCULAR; INTRAVENOUS at 04:12

## 2018-12-14 RX ADMIN — LEVOTHYROXINE SODIUM 25 MCG: 25 TABLET ORAL at 05:12

## 2018-12-14 RX ADMIN — Medication 6.25 MG: at 09:12

## 2018-12-14 NOTE — PLAN OF CARE
Problem: Patient Care Overview  Goal: Plan of Care Review  Outcome: Ongoing (interventions implemented as appropriate)  Pt went down for PEG tube placement today- unsuccessful due to many adhesions. NGT in place to HERNAN gould. Xray done, called MD and spoke to Dr Hansen, states she will look at Xray; order placed to use. Water bolus given, but Dr Boyd states that the pt will restart feedings tomorrow morning. Turned Q2 hr. Hoover functioning well this shift. No complaints. Speaking more this evening, answering some questions appropriately.

## 2018-12-14 NOTE — PROGRESS NOTES
"[facility] Hospital Medicine  Progress Note    Patient Name: Silvia Ornelas  MRN: [id number]  Patient Class: IP- Inpatient   Admission Date: 12/6/2018  Length of Stay: 8 days  Attending Physician: Madison Barron MD  Primary Care Provider: Primary [facility] Medicine Team: [facility] 5 Jeaneth Boyd MD    Subjective:     Principal Problem:Fever    HPI:  This is a 68 year old female transferred from [facility] for fever, requesting ID and general surgery consultations. She has a PMH of severe dementia (baseline status unknown), Afib (rate controlled, not on anticoagulation), HTN, HLD, GERD, and hypothyroidism. 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     History obtained form [facility] paper chart review, secondary to patient's mental status.     She was hospitalized at [facility] on 11/10/18 for suspected aspiration pneumonia; status post treatment with Vanc/Zosyn. She was noted to have abnormal barium swallow at that time; PEG tube placement again discussed with family; they declined. Discharged back to nursing home on 11/16.     She presented again on 11/18/18 for new onset of nausea and vomiting. On presentation, labs significant for BRODY (BUN 24, Cr 2.6). CT A/P showed high-grade small bowel obstruction and bilateral lower lobe consolidations. She was started on Linezolid and Cefepime for suspected bilateral aspiration pneumonia. Her SBO was managed by general surgery with conservative measures, " including NG decompression, pain medications, and IVF.  She then began developing leukocytosis to 18, with new fevers (Tmax 101) on 11/23; 5 day course of Gentamicin was added on 11/22. On 11/26, she was noted to be passing gas and having bowel movements, but continued to have high NG bilious outptut. Repeat CT showed resolving SBO, but continued bilateral lower lobe consolidations. On 11/27, she spiked a fever to 107 (rectal probe) and developed new onset of extremity rigidity, hyperreflexia, and fine tremor, associated with worsening leukocytosis to 28, and lactic of 3. She was assessed to have serotonin syndrome secondary vs NMS due to possible combination of Linezolid, Dilaudid, Fentanyl, and Zofran. She received 140 mg of Dantrolene for possible NMS with symptom improvement; Linezolid discontinued. Patient's antibiotics switched to vancomycin and cefepime for aspiration pneumonitis, WBC was downtrending, and both antibiotics stopped on 12/3 after completed of 6-7 day antibiotic course.  Patient noted to be alert, conversant and oriented to self and with WBC downtrending, antibiotics stopped. Step down from ICU.    Overnight on 12/4, she had resumption of fevers (Tmax 101-102) with again features again of rigidity. Mental status change declined again with minimal  verbal response; patient noted to be ill appearing and diaphoretic.  Medications reviewed with pharmacy to ensure no active medications might be precipitating Serotonin Syndrome. CT head was unremarkable. Blood and urine cultures repeated. Hoover catheter exchanged. LP performed; gram stain negative, but other analyses pending. In discussion of case, decision made to watch patient overnight and re-eval on 12/5. On 12/5, her Vancomycin and Cefepime restarted, also on Diflucan for urine culture positive for Candida.     Additionally, she was noted to have severe oropharyngeal dysphagia. She failed 2 Modified barium swallows, failed again on 12/5; OSH  recommendations were for non-oral feeding. She initially had J-tube after last abdominal surgery, but fell out without attempt on repair. GI tried to place a PEG 1 year ago; but was unable.  IR would not want to place J-tube. [facility] surgery at [facility] declined to operate secondary to complex anatomy.  NG tube was in for bulk of hospitalization for decompression and then tube feedings, but removed for concern of nasopharyngeal erosions.      Hospital Course:  12/6: Admission labs notable for leukocytosis of 17, lactic of 2.4, sodium of 154. Restarted on Vanc/Zosyn. ICU consulted for hypernatremia management; recommended D5W correction based on free water deficit. Started on 14 hours of D5W at 70 mL/hr.   12/7: Overnight, patient noted to be tachycardic to 120, with temperature spike to 101.4. Unable to give Tylenol because of NPO/aspiratioin precautions; defervescence without intervention. Leukocytosis down to 13, lactic down to 1.2. Sodium improved to 151; restarted on D5W 70 mL for 10 hours. Consulted general surgery and ID for further recommendations. Per ID, discontinued all antibiotics. Ordered HIV, RPR, EEG, JÚNIOR, and RF. Ordered CT A/P: no abscess, expected post-operative anatomy changes, but showing RLL consolidation possibly aspiration pneumonia. Patient remains awake, alert, but with disordered and delayed response to questioning.   12/8: No events overnight. Afebrile. Tachycardia improved to lower 100's. Leukocytosis resolved (WBC 10). Sodium normalized to 145. NG restarted with tube feeds and some home medications. Pending surgery decision on possible PEG after they review CT findings. Consulted neurology because of concern of mental status changes reported by family over hospital course. Ordered MRI with/without contrast. Considering serotonin syndrome as possible explanation for fevers, tachycardia, mental status, and contractures on exam.   12/9: No events overnight. Afebrile. No leukocytosis. Still  borderline tachycardia. MRI unremarkable. Started on Cyproheptadine.   12/10:  Febrile overnight to 100.7. Leukocytosis returned to 14. Blood and urine CX repeated. CXR without signs of infection. Started Unasyn, based on RLL consolidation on CT A/P.   12/11: No events overnight. Afebrile, with leukocytosis unchanged (14). Day #2 of Unasyn. UA from yesterday suspicious for infection with 2+ leukocytes and 68 WBC; cultures pending. General surgery discussed PEG with family; will proceed with open gastrostomy tube on Thursday. Changed Bisoprolol to Metoprolol for tachycardia management. Cyproheptadine discontinued due to concerns of skin flushing and fever after doses given.   12/12: No events overnight. Tmax 100.3 overnight. Leukocytosis unchanged (14.4). Hyperkalemic this AM to 5.6; EKG without T-wave abnormalities. Day #3 of Unasyn. Patient more verbal today with staff. NPO at midnight for open G-tube placement tomorrow.   12/13: No events overnight. Leukocytosis slightly improved to 13. Scheduled for open G-tube placement in OR today; but procedure was unsuccessful as surgery noted the extent of adhesions in patient's abdomen completely prevents safe access to gastric wall for G-tube placement and greatly increases her risk of residual bowel perforation. Will likely now reconsider [place]-feeding tube placement before anticipated transfer back to [place]. Day #4 of Unasyn.   12/14 Patient remained stable and responded to all my question with a yes and no answer. She stated she was not in any pain.extensive talk with daughter yesterday about feeding her and for now they would like everything done. She is stable for transfer back to [place] with need to complete her pneumonia treatment, feeding , watch mental status     Interval History: Patient remained stable and responded to all my question with a yes and no answer. She stated she was not in any pain.extensive talk with daughter yesterday about feeding her and for  now they would like everything done. She is stable for transfer back to [place] with need to complete her pneumonia treatment, feeding , watch mental status     Review of Systems   Unable to perform ROS: Patient nonverbal     Objective:     Vital Signs (Most Recent):  Temp: 98.5 °F (36.9 °C) (12/14/18 1158)  Pulse: 77 (12/14/18 1158)  Resp: 16 (12/14/18 1158)  BP: 137/84 (12/14/18 1158)  SpO2: (!) 93 % (12/14/18 1158) Vital Signs (24h Range):  Temp:  [97.5 °F (36.4 °C)-98.5 °F (36.9 °C)] 98.5 °F (36.9 °C)  Pulse:  [] 77  Resp:  [13-18] 16  SpO2:  [93 %-100 %] 93 %  BP: (109-139)/(56-93) 137/84     Weight: 49.2 kg (108 lb 7.5 oz)  Body mass index is 19.21 kg/m².    Intake/Output Summary (Last 24 hours) at 12/14/2018 1229  Last data filed at 12/14/2018 0420  Gross per 24 hour   Intake 1320 ml   Output 1550 ml   Net -230 ml      Physical Exam   Constitutional: She is oriented to person, place, and time. She appears well-developed and well-nourished. No distress.   HENT:   Head: Normocephalic and atraumatic.   Mouth/Throat: Uvula is midline, oropharynx is clear and moist and mucous membranes are normal. Abnormal dentition. No dental abscesses. No tonsillar exudate.   Eyes: Conjunctivae are normal. Pupils are equal, round, and reactive to light. No scleral icterus.   Neck: Neck supple. No JVD present.   Cardiovascular: Regular rhythm, normal heart sounds and normal pulses. Tachycardia present.   No murmur heard.  Pulmonary/Chest: Effort normal and breath sounds normal. No respiratory distress. She has no decreased breath sounds. She has no rales.   Abdominal: Soft. Normal appearance and bowel sounds are normal. She exhibits no distension. There is no tenderness.   Colostomy bag in RLQ; multiple well-healed previous abdominal surgery scars.    Genitourinary:   Genitourinary Comments: Hoover inserted.    Musculoskeletal:   There is atrophy of the muscles of the bilateral lower extremities distal to the knee.     Neurological: She is alert and oriented to person, place, and time. She displays no tremor. No cranial nerve deficit. GCS eye subscore is 4. GCS verbal subscore is 3. GCS motor subscore is 6. She displays no Babinski's sign on the right side. She displays no Babinski's sign on the left side.   There is less contracture of the bilateral feel and upper extremities at the elbow with less rigidity of movement at those joints.    Skin: Skin is warm and dry. No bruising and no rash noted. She is not diaphoretic.   There is reduced flushing around the lower face and anterior neck.        Significant Labs:   CBC:   Recent Labs   Lab 12/13/18 0627 12/14/18  0436   WBC 13.04* 11.92   HGB 9.0* 8.9*   HCT 29.1* 28.2*   * 375*     CMP:   Recent Labs   Lab 12/13/18 0627 12/14/18 0436    138   K 4.6 4.6    101   CO2 26 25   * 93   BUN 21 19   CREATININE 0.8 0.8   CALCIUM 9.2 9.1   ANIONGAP 12 12   EGFRNONAA >60.0 >60.0       Significant Imaging: I have reviewed all pertinent imaging results/findings within the past 24 hours.  I have reviewed and interpreted all pertinent imaging results/findings within the past 24 hours.    Assessment/Plan:      * Fever    · Improving.   · Differentials here include recurrent aspiration pneumonia secondary to dysphagia, bacteremia, and UTI.  · Admitted at OSH on 11/18 for high-grade SBO with recurrent bilateral pneumonia on CT scan on 11/26 at OSH.   · Hospital course at OSH complicated by symptoms of serotonin syndrome vs NMS with fever up to 107, status post Dantrolene, and contributed to Linezolid usage partly.   · Status post multiple courses of broad spectrum coverage including Linezolid, Cefepime, and Gentamicin; restarted on 12/4 with Vancomycin, Cefepime, and Diflucan after re-developing fevers. Urine culture at OSH positive for Candida.   · PICC line placed at OSH; unclear when inserted; will attempt to contact OSH as not clear in transfer paperwork.    · Admission labs notable for leukocytosis of 17 with 80% granulocytes, and lactic of 2.9; restarted Vanc/Zosyn on 12/6.  · HIV, RPR, RF negative. JÚNIOR pending.   · CT A/P (12/7) without evidence of abdominal infection, showed expected post-operative changes, but showed possible RLL consolidation concerning for possible aspiration pneumonia vs pneumonitis.    · ID consulted given persistent fevers without source status post multiple broad spectrum antibiotics; recommending holding all antibiotics after 12/7 and re-culturing if patient spikes fever again. Based on CT findings, have low threshold for starting Unasyn if patient decompensates.   · Febrile 12/10 to 100.7; blood cultures, UA, and CXR repeated. UA suspicious for possible infection with 3+ leukocytes and 68 WBC's; culture pending.   · Leukocytosis stable this AM at 14. Lactate 1.2 as of 12/7.  Day #4 of Unasyn for suspected aspiration PNA based on CT A/P, but also covering for suspected UA.   · Consulted neurology on 12/08 for mental status changes; considering serotonin syndrome as possible explanation for fever, vital signs, and mental status changes given medications at OSH more associated with that compared to NMS. Received cyproheptadine from 12/09-12/10.          Urinary tract infection associated with catheterization of urinary tract    See assessment for fever.        Aspiration pneumonia    See assessment for fever.        Hyperkalemia    · Resolved.    · Likely secondary to over-supplementation with K and phosphorous supplementation containing K.   · EKG ordered; no T wave abnormalities.   · Trending K with BMP.        Serotonin syndrome, presumed    See assessment for fever and dementia without behavioral disturbance.        Hypernatremia    · Sodium was only 145 on admission on 11/18.   · Noted to have progressively increasing Na at OSH up to 153 prior to discharge. Could be explanation for patient's acute mental status change.   · Repeat here  154.   · Evaluated by nephrology there; treated on and off with D5NS and free water, but without consistent correction.   · Resolved initially after approximately 24 hours of D5W at low maintenance.   · Na up to 138 today; giving free water TID via NG daily for correction.      Dysphagia    · Noted to have severe oropharyngeal dysphagia.   · Failed 2 Modified barium swallows, failed again on 12/5; OSH recommendations were for non-oral feeding. Most recent barium swallow assessed as silent aspirator.   · She initially had J-tube after last abdominal surgery, but fell out without attempt on repair.   · GI tried to place a PEG 1 year ago; but was unable.   · IR would not want to place J-tube.   · General surgery at [facility] declined to operate secondary to complex anatomy.   · NG tube was in for bulk of hospitalization for decompression and then tube feedings, but removed for concern of nasopharyngeal erosions.   · Speech evaluated here; recommended strict NPO with aspiration precautions.   · Consulted dietary/nutrition: Tube feed recommendations placed for initiation. Tube feeds at goal, but will need to watch for refeeding syndrome. Mg and P with daily labs.   · General surgery evaluated patient afternoon of 12/7; after discussion with family will proceed with open PEG tube placement on 12/13. However, surgeons were unable to access gastric wall due to extent of adhesions in the patient's abdomen. Will likely proceed with John-feeding tube.      Hypothyroidism    · Resumed home Synthroid.        Atrial fibrillation    · Rate controlled with Bisoprolol at home per outside records.   · CHADVASC score of 3; on no anticoagulation.   · Home Bisoprolol changed to Metoprolol BID for better tachycardia control.            Dementia without behavioral disturbance    · Baseline status unknown. Upper level spoke with daughter Terri (phone number on sticky note). She states that since 2016 her mother's mental status has been  declining she used to be able to do all her ADLs until she started having her abdominal surgeries due to a volvulus.  She was placed in a nursing home in 2016. In the last 4 months she has been less verbal and presented to the OSH for a chocking episode which lead to a cardiac arrest.  · Patient was noted to be awake, alert, and conversing throughout hospital stay at OSH after resolution of possible serotonin syndrome in ICU.   · Noted to have mental status change associated with minimal verbal response after redevelopment of fevers. CT head and LP at OSH unremarkable.   · Noted to be awake and alert, but with disordered response to questions on admission, no change today.   · Cannot resume home dementia medications with aspiration precautions.   · Neurology consulted on 12/8 for mental status changes/worsening dementia; per recommendations, ordered MRI brain with/without contrast which did not show any intracranial abnormalities   · Discussed with neurology; mentioned possibility of serotonin syndrome residual as explanation for vitals, neurological exam, and mental status changes, decided to start on cryptoheptadine 4 mg TID on 12/09 with minimal improvement. Cyproheptadine discontinued on 12/11 due to reported increased skin flushing and temperature spikes after dose.   · Resumed home Donepezil on 12/11 per neurology recs. Neurology signed off.            GERD (gastroesophageal reflux disease)    · Holding home PPI with aspiration precautions.        HLD (hyperlipidemia)    · No prior lipid panel in system.   · Holding home Atorvastatin.       Essential hypertension    · Controlled.   · Holding home BP medications as she is hypotensive  · Watch BP with Cyproheptadine   · Vitals ordered Q4H.          VTE Risk Mitigation (From admission, onward)        Ordered     enoxaparin injection 40 mg  Daily      12/14/18 1052     Place sequential compression device  Until discontinued      12/06/18 1138     IP VTE LOW RISK  PATIENT  Once      12/06/18 1138          Patient seen and examined this morning. Discussed with Dr. Barron   - staff attestation to follow.        Jeaneth Boyd MD  Department of Hospital Medicine   [facility]

## 2018-12-14 NOTE — ASSESSMENT & PLAN NOTE
· Sodium was only 145 on admission on 11/18.   · Noted to have progressively increasing Na at OSH up to 153 prior to discharge. Could be explanation for patient's acute mental status change.   · Repeat here 154.   · Evaluated by nephrology there; treated on and off with D5NS and free water, but without consistent correction.   · Resolved initially after approximately 24 hours of D5W at low maintenance.   · Na up to 138 today; giving free water TID via NG daily for correction.

## 2018-12-14 NOTE — PLAN OF CARE
Problem: Patient Care Overview  Goal: Plan of Care Review  Outcome: Ongoing (interventions implemented as appropriate)  No acute changes overnight. Pt more alert during shift and talking. TF to be restarted on day shift. NG tube intact.IV abx administered as ordered. Bed alarm on. WCTM

## 2018-12-14 NOTE — SUBJECTIVE & OBJECTIVE
Interval History:   Patient seen and examined, no acute events overnight  Unable to place PEG or open G-tube yesterday due to adhesions  +F/BM via ostomy  Afebrile/VSS    Medications:  Continuous Infusions:  Scheduled Meds:   ampicillin-sulbactim (UNASYN) IVPB  3 g Intravenous Q6H    aspirin  81 mg Per NG tube Daily    donepezil  10 mg Per NG tube QHS    levothyroxine  25 mcg Per NG tube Daily    magnesium sulfate IVPB  2 g Intravenous Once    metoprolol  6.25 mg Per NG tube BID     PRN Meds:acetaminophen, dextrose 50%, dextrose 50%, glucagon (human recombinant), glucose, glucose, ondansetron, sodium chloride 0.9%     Review of patient's allergies indicates:   Allergen Reactions    Sulfa (sulfonamide antibiotics) Other (See Comments)     Severe reaction type unknown      Objective:     Vital Signs (Most Recent):  Temp: 98 °F (36.7 °C) (12/14/18 0747)  Pulse: 104 (12/14/18 0747)  Resp: 18 (12/14/18 0747)  BP: 125/88 (12/14/18 0747)  SpO2: 96 % (12/14/18 0747) Vital Signs (24h Range):  Temp:  [97.5 °F (36.4 °C)-98.5 °F (36.9 °C)] 98 °F (36.7 °C)  Pulse:  [] 104  Resp:  [13-18] 18  SpO2:  [93 %-100 %] 96 %  BP: (109-139)/(56-93) 125/88     Weight: 49.2 kg (108 lb 7.5 oz)  Body mass index is 19.21 kg/m².    Intake/Output - Last 3 Shifts       12/12 0700 - 12/13 0659 12/13 0700 - 12/14 0659 12/14 0700 - 12/15 0659    P.O. 0 0     I.V. (mL/kg) 0 (0) 600 (12.2)     Other 0 0     NG/GT 1680 550     IV Piggyback 200 200     Total Intake(mL/kg) 1880 (38.2) 1350 (27.4)     Urine (mL/kg/hr) 975 (0.8) 1500 (1.3)     Drains  0     Stool 200 50     Total Output 1175 1550     Net +705 -200            Stool Occurrence  0 x           Physical Exam   Constitutional: She appears well-developed. No distress.   HENT:   Head: Normocephalic and atraumatic.   Cardiovascular: Normal rate and regular rhythm.   Pulmonary/Chest: Effort normal. No respiratory distress.   Abdominal:   soft, non distended, appropriate TTP  Surgical  incision - dressing in place - clean, dry and intact  Midline laparotomy scar. G and J tube scars. LLQ ileostomy and LUQ mucous fistula?       Significant Labs:  CBC:   Recent Labs   Lab 12/14/18 0436   WBC 11.92   RBC 2.84*   HGB 8.9*   HCT 28.2*   *   MCV 99*   MCH 31.3*   MCHC 31.6*     BMP:   Recent Labs   Lab 12/14/18 0436   GLU 93      K 4.6      CO2 25   BUN 19   CREATININE 0.8   CALCIUM 9.1   MG 1.5*     CMP:   Recent Labs   Lab 12/14/18 0436   GLU 93   CALCIUM 9.1      K 4.6   CO2 25      BUN 19   CREATININE 0.8

## 2018-12-14 NOTE — ASSESSMENT & PLAN NOTE
Ms Ceci is a 69 yo F transferred from [facility] for fever of unknown source requesting ID and general surgery consultations for workup of fevers and placement of PEG in a patient with extensive surgical history. Gastrostomy tube attempted 12/13/18    - unable to place gastrostomy tube percutaneous or open  - may place dobhoff/gomez for long term feeding  - will continue to follow

## 2018-12-14 NOTE — SUBJECTIVE & OBJECTIVE
Interval History: Patient remained stable and responded to all my question with a yes and no answer. She stated she was not in any pain.extensive talk with daughter yesterday about feeding her and for now they would like everything done. She is stable for transfer back to [facility] with need to complete her pneumonia treatment, feeding , watch mental status     Review of Systems   Unable to perform ROS: Patient nonverbal     Objective:     Vital Signs (Most Recent):  Temp: 98.5 °F (36.9 °C) (12/14/18 1158)  Pulse: 77 (12/14/18 1158)  Resp: 16 (12/14/18 1158)  BP: 137/84 (12/14/18 1158)  SpO2: (!) 93 % (12/14/18 1158) Vital Signs (24h Range):  Temp:  [97.5 °F (36.4 °C)-98.5 °F (36.9 °C)] 98.5 °F (36.9 °C)  Pulse:  [] 77  Resp:  [13-18] 16  SpO2:  [93 %-100 %] 93 %  BP: (109-139)/(56-93) 137/84     Weight: 49.2 kg (108 lb 7.5 oz)  Body mass index is 19.21 kg/m².    Intake/Output Summary (Last 24 hours) at 12/14/2018 1229  Last data filed at 12/14/2018 0420  Gross per 24 hour   Intake 1320 ml   Output 1550 ml   Net -230 ml      Physical Exam   Constitutional: She is oriented to person, place, and time. She appears well-developed and well-nourished. No distress.   HENT:   Head: Normocephalic and atraumatic.   Mouth/Throat: Uvula is midline, oropharynx is clear and moist and mucous membranes are normal. Abnormal dentition. No dental abscesses. No tonsillar exudate.   Eyes: Conjunctivae are normal. Pupils are equal, round, and reactive to light. No scleral icterus.   Neck: Neck supple. No JVD present.   Cardiovascular: Regular rhythm, normal heart sounds and normal pulses. Tachycardia present.   No murmur heard.  Pulmonary/Chest: Effort normal and breath sounds normal. No respiratory distress. She has no decreased breath sounds. She has no rales.   Abdominal: Soft. Normal appearance and bowel sounds are normal. She exhibits no distension. There is no tenderness.   Colostomy bag in RLQ; multiple well-healed previous  abdominal surgery scars.    Genitourinary:   Genitourinary Comments: Hoover inserted.    Musculoskeletal:   There is atrophy of the muscles of the bilateral lower extremities distal to the knee.    Neurological: She is alert and oriented to person, place, and time. She displays no tremor. No cranial nerve deficit. GCS eye subscore is 4. GCS verbal subscore is 3. GCS motor subscore is 6. She displays no Babinski's sign on the right side. She displays no Babinski's sign on the left side.   There is less contracture of the bilateral feel and upper extremities at the elbow with less rigidity of movement at those joints.    Skin: Skin is warm and dry. No bruising and no rash noted. She is not diaphoretic.   There is reduced flushing around the lower face and anterior neck.        Significant Labs:   CBC:   Recent Labs   Lab 12/13/18 0627 12/14/18 0436   WBC 13.04* 11.92   HGB 9.0* 8.9*   HCT 29.1* 28.2*   * 375*     CMP:   Recent Labs   Lab 12/13/18 0627 12/14/18  0436    138   K 4.6 4.6    101   CO2 26 25   * 93   BUN 21 19   CREATININE 0.8 0.8   CALCIUM 9.2 9.1   ANIONGAP 12 12   EGFRNONAA >60.0 >60.0       Significant Imaging: I have reviewed all pertinent imaging results/findings within the past 24 hours.  I have reviewed and interpreted all pertinent imaging results/findings within the past 24 hours.

## 2018-12-14 NOTE — ASSESSMENT & PLAN NOTE
· Resolved.    · Likely secondary to over-supplementation with K and phosphorous supplementation containing K.   · EKG ordered; no T wave abnormalities.   · Trending K with BMP.

## 2018-12-14 NOTE — PROGRESS NOTES
"[facility]  General Surgery  Progress Note    Subjective:     History of Present Illness:  Ms Ornelas is a 69 yo F transferred from [facility] for fever of unknown source requesting ID and general surgery consultations for workup of fevers and placement of PEG in a patient with extensive surgical history.    From primary team note:     She has a PMH of severe dementia (baseline status unknown), Afib (rate controlled, not on anticoagulation), HTN, HLD, GERD, and hypothyroidism.     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      History obtained form OSH paper chart review, secondary to patient's mental status.      She was hospitalized at [facility] on 11/10/18 for suspected aspiration pneumonia; status post treatment with Vanc/Zosyn. She was noted to have abnormal barium swallow at that time.   PEG tube placement again discussed with family; they declined.    Discharged back to nursing home on 11/16.      She presented again on 11/18/18 for new onset of nausea and vomiting. On presentation, labs significant for BRODY (BUN 24, Cr 2.6). CT A/P showed high-grade small bowel obstruction and bilateral lower lobe consolidations. She was started on Linezolid and Cefepime for suspected bilateral aspiration pneumonia. Her SBO was managed by general surgery with conservative measures, including NG decompression, pain medications, and IVF.  She then began developing leukocytosis to 18, with new fevers (Tmax 101) " on 11/23; 5 day course of Gentamicin was added on 11/22.     On 11/26, she was noted to be passing gas and having bowel movements, but continued to have high NG bilious outptut. Repeat CT showed resolving SBO, but continued bilateral lower lobe consolidations. On 11/27, she spiked a fever to 107 (rectal probe) and developed new onset of extremity rigidity, hyperreflexia, and fine tremor, associated with worsening leukocytosis to 28, and lactic of 3. She was assessed to have serotonin syndrome secondary vs NMS due to possible combination of Linezolid, Dilaudid, Fentanyl, and Zofran. She received 140 mg of Dantrolene for possible NMS with symptom improvement; Linezolid discontinued. Patient's antibiotics switched to vancomycin and cefepime for aspiration pneumonitis, WBC was downtrending, and both antibiotics stopped on 12/3 after completed of 6-7 day antibiotic course.  Patient noted to be alert, conversant and oriented to self and with WBC downtrending, antibiotics stopped. Step down from ICU.     Overnight on 12/4, she had resumption of fevers (Tmax 101-102) with again features again of rigidity. Mental status change declined again with minimal  verbal response; patient noted to be ill appearing and diaphoretic.  Medications reviewed with pharmacy to ensure no active medications might be precipitating Serotonin Syndrome. CT head was unremarkable. Blood and urine cultures repeated. Hoover catheter exchanged. LP performed; gram stain negative, but other analyses pending. In discussion of case, decision made to watch patient overnight and re-eval on 12/5. On 12/5, her Vancomycin and Cefepime restarted, also on Diflucan for urine culture positive for Candida.      Additionally, she was noted to have severe oropharyngeal dysphagia. She failed 2 Modified barium swallows, failed again on 12/5; OSH recommendations were for non-oral feeding. She initially had J-tube after last abdominal surgery, but fell out without attempt  on repair. GI tried to place a PEG 1 year ago; but was unable.  IR would not want to place J-tube. General surgery at [facility] declined to operate secondary to complex anatomy.      NG tube was in for bulk of hospitalization for decompression and then tube feedings, but removed for concern of nasopharyngeal erosions.           Post-Op Info:  Procedure(s) (LRB):  aborted PEG insertion: abdominal wound exploration (N/A)  EGD (ESOPHAGOGASTRODUODENOSCOPY) (N/A)   1 Day Post-Op     Interval History:   Patient seen and examined, no acute events overnight  Unable to place PEG or open G-tube yesterday due to adhesions  +F/BM via ostomy  Afebrile/VSS    Medications:  Continuous Infusions:  Scheduled Meds:   ampicillin-sulbactim (UNASYN) IVPB  3 g Intravenous Q6H    aspirin  81 mg Per NG tube Daily    donepezil  10 mg Per NG tube QHS    levothyroxine  25 mcg Per NG tube Daily    magnesium sulfate IVPB  2 g Intravenous Once    metoprolol  6.25 mg Per NG tube BID     PRN Meds:acetaminophen, dextrose 50%, dextrose 50%, glucagon (human recombinant), glucose, glucose, ondansetron, sodium chloride 0.9%     Review of patient's allergies indicates:   Allergen Reactions    Sulfa (sulfonamide antibiotics) Other (See Comments)     Severe reaction type unknown      Objective:     Vital Signs (Most Recent):  Temp: 98 °F (36.7 °C) (12/14/18 0747)  Pulse: 104 (12/14/18 0747)  Resp: 18 (12/14/18 0747)  BP: 125/88 (12/14/18 0747)  SpO2: 96 % (12/14/18 0747) Vital Signs (24h Range):  Temp:  [97.5 °F (36.4 °C)-98.5 °F (36.9 °C)] 98 °F (36.7 °C)  Pulse:  [] 104  Resp:  [13-18] 18  SpO2:  [93 %-100 %] 96 %  BP: (109-139)/(56-93) 125/88     Weight: 49.2 kg (108 lb 7.5 oz)  Body mass index is 19.21 kg/m².    Intake/Output - Last 3 Shifts       12/12 0700 - 12/13 0659 12/13 0700 - 12/14 0659 12/14 0700 - 12/15 0659    P.O. 0 0     I.V. (mL/kg) 0 (0) 600 (12.2)     Other 0 0     NG/GT 1680 550     IV Piggyback 200 200     Total  Intake(mL/kg) 1880 (38.2) 1350 (27.4)     Urine (mL/kg/hr) 975 (0.8) 1500 (1.3)     Drains  0     Stool 200 50     Total Output 1175 1550     Net +705 -200            Stool Occurrence  0 x           Physical Exam   Constitutional: She appears well-developed. No distress.   HENT:   Head: Normocephalic and atraumatic.   Cardiovascular: Normal rate and regular rhythm.   Pulmonary/Chest: Effort normal. No respiratory distress.   Abdominal:   soft, non distended, appropriate TTP  Surgical incision - dressing in place - clean, dry and intact  Midline laparotomy scar. G and J tube scars. LLQ ileostomy and LUQ mucous fistula?       Significant Labs:  CBC:   Recent Labs   Lab 12/14/18  0436   WBC 11.92   RBC 2.84*   HGB 8.9*   HCT 28.2*   *   MCV 99*   MCH 31.3*   MCHC 31.6*     BMP:   Recent Labs   Lab 12/14/18  0436   GLU 93      K 4.6      CO2 25   BUN 19   CREATININE 0.8   CALCIUM 9.1   MG 1.5*     CMP:   Recent Labs   Lab 12/14/18  0436   GLU 93   CALCIUM 9.1      K 4.6   CO2 25      BUN 19   CREATININE 0.8     Assessment/Plan:     Dysphagia    Ms Ornelas is a 67 yo F transferred from [facility] for fever of unknown source requesting ID and general surgery consultations for workup of fevers and placement of PEG in a patient with extensive surgical history. Gastrostomy tube attempted 12/13/18    - unable to place gastrostomy tube percutaneous or open  - may place dobhoff/gomez for long term feeding  - will continue to follow           Patricia Garner PA-C   [id number]  General Surgery  [facility]

## 2018-12-14 NOTE — PLAN OF CARE
Transfer center notified Dr. Barron that [facility] refused to accept the patient back. CM notified family. Awaiting disposition

## 2018-12-15 LAB
ANION GAP SERPL CALC-SCNC: 11 MMOL/L
BACTERIA BLD CULT: NORMAL
BACTERIA BLD CULT: NORMAL
BASOPHILS # BLD AUTO: 0.04 K/UL
BASOPHILS NFR BLD: 0.4 %
BUN SERPL-MCNC: 22 MG/DL
CALCIUM SERPL-MCNC: 9 MG/DL
CHLORIDE SERPL-SCNC: 99 MMOL/L
CO2 SERPL-SCNC: 28 MMOL/L
CREAT SERPL-MCNC: 0.8 MG/DL
DIFFERENTIAL METHOD: ABNORMAL
EOSINOPHIL # BLD AUTO: 0.2 K/UL
EOSINOPHIL NFR BLD: 1.6 %
ERYTHROCYTE [DISTWIDTH] IN BLOOD BY AUTOMATED COUNT: 18.7 %
EST. GFR  (AFRICAN AMERICAN): >60 ML/MIN/1.73 M^2
EST. GFR  (NON AFRICAN AMERICAN): >60 ML/MIN/1.73 M^2
GLUCOSE SERPL-MCNC: 133 MG/DL
HCT VFR BLD AUTO: 29 %
HGB BLD-MCNC: 9.1 G/DL
IMM GRANULOCYTES # BLD AUTO: 0.13 K/UL
IMM GRANULOCYTES NFR BLD AUTO: 1.4 %
LYMPHOCYTES # BLD AUTO: 2.3 K/UL
LYMPHOCYTES NFR BLD: 24.2 %
MAGNESIUM SERPL-MCNC: 2.1 MG/DL
MCH RBC QN AUTO: 30.7 PG
MCHC RBC AUTO-ENTMCNC: 31.4 G/DL
MCV RBC AUTO: 98 FL
MONOCYTES # BLD AUTO: 1.4 K/UL
MONOCYTES NFR BLD: 14.5 %
NEUTROPHILS # BLD AUTO: 5.6 K/UL
NEUTROPHILS NFR BLD: 57.9 %
NRBC BLD-RTO: 0 /100 WBC
PHOSPHATE SERPL-MCNC: 3.5 MG/DL
PLATELET # BLD AUTO: 410 K/UL
PMV BLD AUTO: 12.5 FL
POTASSIUM SERPL-SCNC: 4.1 MMOL/L
RBC # BLD AUTO: 2.96 M/UL
SODIUM SERPL-SCNC: 138 MMOL/L
WBC # BLD AUTO: 9.59 K/UL

## 2018-12-15 PROCEDURE — 99232 SBSQ HOSP IP/OBS MODERATE 35: CPT | Mod: GC,,, | Performed by: HOSPITALIST

## 2018-12-15 PROCEDURE — 80048 BASIC METABOLIC PNL TOTAL CA: CPT

## 2018-12-15 PROCEDURE — 20600001 HC STEP DOWN PRIVATE ROOM

## 2018-12-15 PROCEDURE — 84100 ASSAY OF PHOSPHORUS: CPT

## 2018-12-15 PROCEDURE — 85025 COMPLETE CBC W/AUTO DIFF WBC: CPT

## 2018-12-15 PROCEDURE — 63600175 PHARM REV CODE 636 W HCPCS: Performed by: STUDENT IN AN ORGANIZED HEALTH CARE EDUCATION/TRAINING PROGRAM

## 2018-12-15 PROCEDURE — 83735 ASSAY OF MAGNESIUM: CPT

## 2018-12-15 PROCEDURE — 25000003 PHARM REV CODE 250: Performed by: HOSPITALIST

## 2018-12-15 PROCEDURE — 63600175 PHARM REV CODE 636 W HCPCS: Performed by: HOSPITALIST

## 2018-12-15 PROCEDURE — 36415 COLL VENOUS BLD VENIPUNCTURE: CPT

## 2018-12-15 PROCEDURE — 99232 PR SUBSEQUENT HOSPITAL CARE,LEVL II: ICD-10-PCS | Mod: GC,,, | Performed by: HOSPITALIST

## 2018-12-15 PROCEDURE — 25000003 PHARM REV CODE 250: Performed by: STUDENT IN AN ORGANIZED HEALTH CARE EDUCATION/TRAINING PROGRAM

## 2018-12-15 RX ADMIN — ASPIRIN 81 MG CHEWABLE TABLET 81 MG: 81 TABLET CHEWABLE at 09:12

## 2018-12-15 RX ADMIN — AMPICILLIN SODIUM AND SULBACTAM SODIUM 3 G: 2; 1 INJECTION, POWDER, FOR SOLUTION INTRAMUSCULAR; INTRAVENOUS at 09:12

## 2018-12-15 RX ADMIN — AMPICILLIN SODIUM AND SULBACTAM SODIUM 3 G: 2; 1 INJECTION, POWDER, FOR SOLUTION INTRAMUSCULAR; INTRAVENOUS at 04:12

## 2018-12-15 RX ADMIN — LEVOTHYROXINE SODIUM 25 MCG: 25 TABLET ORAL at 06:12

## 2018-12-15 RX ADMIN — DONEPEZIL HYDROCHLORIDE 10 MG: 5 TABLET, FILM COATED ORAL at 09:12

## 2018-12-15 RX ADMIN — Medication 6.25 MG: at 09:12

## 2018-12-15 RX ADMIN — ENOXAPARIN SODIUM 40 MG: 100 INJECTION SUBCUTANEOUS at 04:12

## 2018-12-15 NOTE — PLAN OF CARE
Problem: Adult Inpatient Plan of Care  Goal: Plan of Care Review  Outcome: Ongoing (interventions implemented as appropriate)  POC reviewed : pt noted [place] : alert x1 , responds to voice & touch : NGT to left nare with continuous tube feeding @ 40 ml /hr , tolerating well : dressing to  ruq changed : ileostomy care performed : TQ2 : alvarez cath in place for urinary retention : peg tube placement attempted : midline incision noted with derma bond / abd binder : VSS: will cont to monitor.

## 2018-12-15 NOTE — SUBJECTIVE & OBJECTIVE
Interval History: Patient evaluated at bedside this morning. She again for me, remains awake, alert, but non-verbal to questions.     Review of Systems   Unable to perform ROS: Patient nonverbal     Objective:     Vital Signs (Most Recent):  Temp: 97.6 °F (36.4 °C) (12/15/18 0756)  Pulse: 100 (12/15/18 0826)  Resp: 16 (12/15/18 0756)  BP: 109/70 (12/15/18 0756)  SpO2: (!) 94 % (12/15/18 0756) Vital Signs (24h Range):  Temp:  [97.1 °F (36.2 °C)-98.5 °F (36.9 °C)] 97.6 °F (36.4 °C)  Pulse:  [] 100  Resp:  [14-17] 16  SpO2:  [93 %-99 %] 94 %  BP: (109-137)/(55-84) 109/70     Weight: 53.2 kg (117 lb 4.6 oz)  Body mass index is 20.78 kg/m².    Intake/Output Summary (Last 24 hours) at 12/15/2018 0845  Last data filed at 12/15/2018 0600  Gross per 24 hour   Intake 1880 ml   Output 1950 ml   Net -70 ml      Physical Exam   Constitutional: She is oriented to person, place, and time. She appears well-developed and well-nourished. No distress.   HENT:   Head: Normocephalic and atraumatic.   Mouth/Throat: Uvula is midline, oropharynx is clear and moist and mucous membranes are normal. Abnormal dentition. No dental abscesses. No tonsillar exudate.   Eyes: Conjunctivae are normal. Pupils are equal, round, and reactive to light. No scleral icterus.   Neck: Neck supple. No JVD present.   Cardiovascular: Regular rhythm, normal heart sounds and normal pulses. Tachycardia present.   No murmur heard.  Pulmonary/Chest: Effort normal and breath sounds normal. No respiratory distress. She has no decreased breath sounds. She has no rales.   Abdominal: Soft. Normal appearance and bowel sounds are normal. She exhibits no distension. There is no tenderness.   Abdominal binder loosely in place. Intact bandage near colostomy site. Colostomy bag in RLQ; multiple well-healed previous abdominal surgery scars.    Genitourinary:   Genitourinary Comments: Hoover inserted.    Musculoskeletal:   There is atrophy of the muscles of the bilateral  lower extremities distal to the knee.    Neurological: She is alert and oriented to person, place, and time. She displays no tremor. No cranial nerve deficit. GCS eye subscore is 4. GCS verbal subscore is 3. GCS motor subscore is 6. She displays no Babinski's sign on the right side. She displays no Babinski's sign on the left side.   There is less contracture of the bilateral feel and upper extremities at the elbow with less rigidity of movement at those joints.    Skin: Skin is warm and dry. No bruising and no rash noted. She is not diaphoretic.   There is reduced flushing around the lower face and anterior neck.        Significant Labs:   CBC:   Recent Labs   Lab 12/14/18  0436 12/15/18  0524   WBC 11.92 9.59   HGB 8.9* 9.1*   HCT 28.2* 29.0*   * 410*     CMP:   Recent Labs   Lab 12/14/18  0436 12/15/18  0524    138   K 4.6 4.1    99   CO2 25 28   GLU 93 133*   BUN 19 22   CREATININE 0.8 0.8   CALCIUM 9.1 9.0   ANIONGAP 12 11   EGFRNONAA >60.0 >60.0       Significant Imaging: I have reviewed all pertinent imaging results/findings within the past 24 hours.  I have reviewed and interpreted all pertinent imaging results/findings within the past 24 hours.

## 2018-12-15 NOTE — ASSESSMENT & PLAN NOTE
· Noted to have severe oropharyngeal dysphagia.   · Failed 2 Modified barium swallows, failed again on 12/5; OSH recommendations were for non-oral feeding. Most recent barium swallow assessed as silent aspirator.   · She initially had J-tube after last abdominal surgery, but fell out without attempt on repair.   · GI tried to place a PEG 1 year ago; but was unable.   · IR would not want to place J-tube.   · General surgery at [facility] declined to operate secondary to complex anatomy.   · NG tube was in for bulk of hospitalization for decompression and then tube feedings, but removed for concern of nasopharyngeal erosions.   · Speech evaluated here; recommended strict NPO with aspiration precautions.   · Consulted dietary/nutrition: Tube feed recommendations placed for initiation. Tube feeds at goal, but will need to watch for refeeding syndrome. Mg and P with daily labs.   · General surgery evaluated patient afternoon of 12/7; after discussion with family will proceed with open PEG tube placement on 12/13. However, surgeons were unable to access gastric wall due to extent of adhesions in the patient's abdomen. Will likely proceed with John-feeding tube, could be done at [facility].   · Plan for repeat modified barium swallow by SLP on 12/17.

## 2018-12-15 NOTE — PROGRESS NOTES
"[facility] Hospital Medicine  Progress Note    Patient Name: Silvia Ornelas  MRN: [id number]  Patient Class: IP- Inpatient   Admission Date: 12/6/2018  Length of Stay: 9 days  Attending Physician: Madsion Barron MD  Primary Care Provider: Primary Doctor [facility] Medicine Team: [facility]OSP MED 5 Kelvin Mcadams MD    Subjective:     Principal Problem:Fever    HPI:  This is a 68 year old female transferred from [facility] for fever, requesting ID and general surgery consultations. She has a PMH of severe dementia (baseline status unknown), Afib (rate controlled, not on anticoagulation), HTN, HLD, GERD, and hypothyroidism. 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     History obtained form [facility] paper chart review, secondary to patient's mental status.     She was hospitalized at [facility] on 11/10/18 for suspected aspiration pneumonia; status post treatment with Vanc/Zosyn. She was noted to have abnormal barium swallow at that time; PEG tube placement again discussed with family; they declined. Discharged back to nursing home on 11/16.     She presented again on 11/18/18 for new onset of nausea and vomiting. On presentation, labs significant for BRODY (BUN 24, Cr 2.6). CT A/P showed high-grade small bowel obstruction and bilateral lower lobe consolidations. She was started on Linezolid and Cefepime for suspected bilateral aspiration pneumonia. Her SBO was managed by general surgery with conservative measures, " including NG decompression, pain medications, and IVF.  She then began developing leukocytosis to 18, with new fevers (Tmax 101) on 11/23; 5 day course of Gentamicin was added on 11/22. On 11/26, she was noted to be passing gas and having bowel movements, but continued to have high NG bilious outptut. Repeat CT showed resolving SBO, but continued bilateral lower lobe consolidations. On 11/27, she spiked a fever to 107 (rectal probe) and developed new onset of extremity rigidity, hyperreflexia, and fine tremor, associated with worsening leukocytosis to 28, and lactic of 3. She was assessed to have serotonin syndrome secondary vs NMS due to possible combination of Linezolid, Dilaudid, Fentanyl, and Zofran. She received 140 mg of Dantrolene for possible NMS with symptom improvement; Linezolid discontinued. Patient's antibiotics switched to vancomycin and cefepime for aspiration pneumonitis, WBC was downtrending, and both antibiotics stopped on 12/3 after completed of 6-7 day antibiotic course.  Patient noted to be alert, conversant and oriented to self and with WBC downtrending, antibiotics stopped. Step down from ICU.    Overnight on 12/4, she had resumption of fevers (Tmax 101-102) with again features again of rigidity. Mental status change declined again with minimal  verbal response; patient noted to be ill appearing and diaphoretic.  Medications reviewed with pharmacy to ensure no active medications might be precipitating Serotonin Syndrome. CT head was unremarkable. Blood and urine cultures repeated. Hoover catheter exchanged. LP performed; gram stain negative, but other analyses pending. In discussion of case, decision made to watch patient overnight and re-eval on 12/5. On 12/5, her Vancomycin and Cefepime restarted, also on Diflucan for urine culture positive for Candida.     Additionally, she was noted to have severe oropharyngeal dysphagia. She failed 2 Modified barium swallows, failed again on 12/5; OSH  recommendations were for non-oral feeding. She initially had J-tube after last abdominal surgery, but fell out without attempt on repair. GI tried to place a PEG 1 year ago; but was unable.  IR would not want to place J-tube. General surgery at [facility] declined to operate secondary to complex anatomy.  NG tube was in for bulk of hospitalization for decompression and then tube feedings, but removed for concern of nasopharyngeal erosions.      Hospital Course:  12/6: Admission labs notable for leukocytosis of 17, lactic of 2.4, sodium of 154. Restarted on Vanc/Zosyn. ICU consulted for hypernatremia management; recommended D5W correction based on free water deficit. Started on 14 hours of D5W at 70 mL/hr.   12/7: Overnight, patient noted to be tachycardic to 120, with temperature spike to 101.4. Unable to give Tylenol because of NPO/aspiratioin precautions; defervescence without intervention. Leukocytosis down to 13, lactic down to 1.2. Sodium improved to 151; restarted on D5W 70 mL for 10 hours. Consulted general surgery and ID for further recommendations. Per ID, discontinued all antibiotics. Ordered HIV, RPR, EEG, JÚNIOR, and RF. Ordered CT A/P: no abscess, expected post-operative anatomy changes, but showing RLL consolidation possibly aspiration pneumonia. Patient remains awake, alert, but with disordered and delayed response to questioning.   12/8: No events overnight. Afebrile. Tachycardia improved to lower 100's. Leukocytosis resolved (WBC 10). Sodium normalized to 145. NG restarted with tube feeds and some home medications. Pending surgery decision on possible PEG after they review CT findings. Consulted neurology because of concern of mental status changes reported by family over hospital course. Ordered MRI with/without contrast. Considering serotonin syndrome as possible explanation for fevers, tachycardia, mental status, and contractures on exam.   12/9: No events overnight. Afebrile. No leukocytosis. Still  borderline tachycardia. MRI unremarkable. Started on Cyproheptadine.   12/10:  Febrile overnight to 100.7. Leukocytosis returned to 14. Blood and urine CX repeated. CXR without signs of infection. Started Unasyn, based on RLL consolidation on CT A/P.   12/11: No events overnight. Afebrile, with leukocytosis unchanged (14). Day #2 of Unasyn. UA from yesterday suspicious for infection with 2+ leukocytes and 68 WBC; cultures pending. General surgery discussed PEG with family; will proceed with open gastrostomy tube on Thursday. Changed Bisoprolol to Metoprolol for tachycardia management. Cyproheptadine discontinued due to concerns of skin flushing and fever after doses given.   12/12: No events overnight. Tmax 100.3 overnight. Leukocytosis unchanged (14.4). Hyperkalemic this AM to 5.6; EKG without T-wave abnormalities. Day #3 of Unasyn. Patient more verbal today with staff. NPO at midnight for open G-tube placement tomorrow.   12/13: No events overnight. Leukocytosis slightly improved to 13. Scheduled for open G-tube placement in OR today; but procedure was unsuccessful as surgery noted the extent of adhesions in patient's abdomen completely prevents safe access to gastric wall for G-tube placement and greatly increases her risk of residual bowel perforation. Will likely now reconsider [place]-feeding tube placement before anticipated transfer back to [place]. Day #4 of Unasyn.   12/14: Patient remained stable and responded to all my question with a yes and no answer. She stated she was not in any pain.extensive talk with daughter yesterday about feeding her and for now they would like everything done. She is stable for transfer back to [place] with need to complete her pneumonia treatment, feeding , watch mental status. Day #5 of Unasyn. Patient declined by [facility] for transfer back. NG replaced; re-initiated on tube feeds.   12/15: No events overnight. Afebrile. Leukocytosis resolved. Day #6 of Unasyn.  Palliative care consult placed for goals of care discussion.     Interval History: Patient evaluated at bedside this morning. She again for me, remains awake, alert, but non-verbal to questions.     Review of Systems   Unable to perform ROS: Patient nonverbal     Objective:     Vital Signs (Most Recent):  Temp: 97.6 °F (36.4 °C) (12/15/18 0756)  Pulse: 100 (12/15/18 0826)  Resp: 16 (12/15/18 0756)  BP: 109/70 (12/15/18 0756)  SpO2: (!) 94 % (12/15/18 0756) Vital Signs (24h Range):  Temp:  [97.1 °F (36.2 °C)-98.5 °F (36.9 °C)] 97.6 °F (36.4 °C)  Pulse:  [] 100  Resp:  [14-17] 16  SpO2:  [93 %-99 %] 94 %  BP: (109-137)/(55-84) 109/70     Weight: 53.2 kg (117 lb 4.6 oz)  Body mass index is 20.78 kg/m².    Intake/Output Summary (Last 24 hours) at 12/15/2018 0845  Last data filed at 12/15/2018 0600  Gross per 24 hour   Intake 1880 ml   Output 1950 ml   Net -70 ml      Physical Exam   Constitutional: She is oriented to person, place, and time. She appears well-developed and well-nourished. No distress.   HENT:   Head: Normocephalic and atraumatic.   Mouth/Throat: Uvula is midline, oropharynx is clear and moist and mucous membranes are normal. Abnormal dentition. No dental abscesses. No tonsillar exudate.   Eyes: Conjunctivae are normal. Pupils are equal, round, and reactive to light. No scleral icterus.   Neck: Neck supple. No JVD present.   Cardiovascular: Regular rhythm, normal heart sounds and normal pulses. Tachycardia present.   No murmur heard.  Pulmonary/Chest: Effort normal and breath sounds normal. No respiratory distress. She has no decreased breath sounds. She has no rales.   Abdominal: Soft. Normal appearance and bowel sounds are normal. She exhibits no distension. There is no tenderness.   Abdominal binder loosely in place. Intact bandage near colostomy site. Colostomy bag in RLQ; multiple well-healed previous abdominal surgery scars.    Genitourinary:   Genitourinary Comments: Hoover inserted.     Musculoskeletal:   There is atrophy of the muscles of the bilateral lower extremities distal to the knee.    Neurological: She is alert and oriented to person, place, and time. She displays no tremor. No cranial nerve deficit. GCS eye subscore is 4. GCS verbal subscore is 3. GCS motor subscore is 6. She displays no Babinski's sign on the right side. She displays no Babinski's sign on the left side.   There is less contracture of the bilateral feel and upper extremities at the elbow with less rigidity of movement at those joints.    Skin: Skin is warm and dry. No bruising and no rash noted. She is not diaphoretic.   There is reduced flushing around the lower face and anterior neck.        Significant Labs:   CBC:   Recent Labs   Lab 12/14/18  0436 12/15/18  0524   WBC 11.92 9.59   HGB 8.9* 9.1*   HCT 28.2* 29.0*   * 410*     CMP:   Recent Labs   Lab 12/14/18  0436 12/15/18  0524    138   K 4.6 4.1    99   CO2 25 28   GLU 93 133*   BUN 19 22   CREATININE 0.8 0.8   CALCIUM 9.1 9.0   ANIONGAP 12 11   EGFRNONAA >60.0 >60.0       Significant Imaging: I have reviewed all pertinent imaging results/findings within the past 24 hours.  I have reviewed and interpreted all pertinent imaging results/findings within the past 24 hours.    Assessment/Plan:      * Fever    · Improved.   · Differentials here include recurrent aspiration pneumonia secondary to dysphagia, bacteremia, and UTI.  · Admitted at OSH on 11/18 for high-grade SBO with recurrent bilateral pneumonia on CT scan on 11/26 at OSH.   · Hospital course at OSH complicated by symptoms of serotonin syndrome vs NMS with fever up to 107, status post Dantrolene, and contributed to Linezolid usage partly.   · Status post multiple courses of broad spectrum coverage including Linezolid, Cefepime, and Gentamicin; restarted on 12/4 with Vancomycin, Cefepime, and Diflucan after re-developing fevers. Urine culture at OSH positive for Candida.   · PICC line  placed at OSH; unclear when inserted; will attempt to contact OSH as not clear in transfer paperwork.   · Admission labs notable for leukocytosis of 17 with 80% granulocytes, and lactic of 2.9; restarted Vanc/Zosyn on 12/6.  · HIV, RPR, JÚNIOR, and RF negative.   · CT A/P (12/7) without evidence of abdominal infection, showed expected post-operative changes, but showed possible RLL consolidation concerning for possible aspiration pneumonia vs pneumonitis.    · ID consulted given persistent fevers without source status post multiple broad spectrum antibiotics; recommending holding all antibiotics after 12/7 and re-culturing if patient spikes fever again. Based on CT findings, have low threshold for starting Unasyn if patient decompensates.   · Febrile 12/10 to 100.7; blood cultures, UA, and CXR repeated. UA suspicious for possible infection with 3+ leukocytes and 68 WBC's; culture pending.   · Leukocytosis resolved. Lactate 1.2 as of 12/7.  Day #6/7 of Unasyn for suspected aspiration PNA based on CT A/P, but also covering for suspected UA.   · Consulted neurology on 12/08 for mental status changes; considered serotonin syndrome as possible explanation for fever, vital signs, and mental status changes given medications at OSH more associated with that compared to NMS. Received cyproheptadine from 12/09-12/10 with minimal improvement.   · Stable for transfer back to [place].          Urinary tract infection associated with catheterization of urinary tract    See assessment for fever.        Aspiration pneumonia    See assessment for fever.        Hyperkalemia    · Resolved.    · Likely secondary to over-supplementation with K and phosphorous supplementation containing K.   · EKG ordered; no T wave abnormalities.   · Trending K with BMP.        Serotonin syndrome, presumed    See assessment for fever and dementia without behavioral disturbance.        Hypernatremia    · Sodium was only 145 on admission on 11/18.    · Noted to have progressively increasing Na at OSH up to 153 prior to discharge. Could be explanation for patient's acute mental status change.   · Repeat here 154.   · Evaluated by nephrology there; treated on and off with D5NS and free water, but without consistent correction.   · Resolved initially after approximately 24 hours of D5W at low maintenance.   · Na up to 138 today; giving free water TID via NG daily for correction.      Dysphagia    · Noted to have severe oropharyngeal dysphagia.   · Failed 2 Modified barium swallows, failed again on 12/5; OSH recommendations were for non-oral feeding. Most recent barium swallow assessed as silent aspirator.   · She initially had J-tube after last abdominal surgery, but fell out without attempt on repair.   · GI tried to place a PEG 1 year ago; but was unable.   · IR would not want to place J-tube.   · General surgery at [facility] declined to operate secondary to complex anatomy.   · NG tube was in for bulk of hospitalization for decompression and then tube feedings, but removed for concern of nasopharyngeal erosions.   · Speech evaluated here; recommended strict NPO with aspiration precautions.   · Consulted dietary/nutrition: Tube feed recommendations placed for initiation. Tube feeds at goal, but will need to watch for refeeding syndrome. Mg and P with daily labs.   · General surgery evaluated patient afternoon of 12/7; after discussion with family will proceed with open PEG tube placement on 12/13. However, surgeons were unable to access gastric wall due to extent of adhesions in the patient's abdomen. Will likely proceed with John-feeding tube, could be done at [facility].   · Plan for repeat modified barium swallow by SLP on 12/17.      Hypothyroidism    · Resumed home Synthroid.        Atrial fibrillation    · Rate controlled with Bisoprolol at home per outside records.   · CHADVASC score of 3; on no anticoagulation.   · Home Bisoprolol changed to  Metoprolol BID for better tachycardia control.            Dementia without behavioral disturbance    · Baseline status unknown. Upper level spoke with daughter Terri (phone number on sticky note). She states that since 2016 her mother's mental status has been declining she used to be able to do all her ADLs until she started having her abdominal surgeries due to a volvulus.  She was placed in a nursing home in 2016. In the last 4 months she has been less verbal and presented to the OSH for a chocking episode which lead to a cardiac arrest.  · Patient was noted to be awake, alert, and conversing throughout hospital stay at OSH after resolution of possible serotonin syndrome in ICU.   · Noted to have mental status change associated with minimal verbal response after redevelopment of fevers. CT head and LP at OSH unremarkable.   · Noted to be awake and alert, but with disordered response to questions on admission, no change today.   · Cannot resume home dementia medications with aspiration precautions.   · Neurology consulted on 12/8 for mental status changes/worsening dementia; per recommendations, ordered MRI brain with/without contrast which did not show any intracranial abnormalities   · Discussed with neurology; mentioned possibility of serotonin syndrome residual as explanation for vitals, neurological exam, and mental status changes, decided to start on cryptoheptadine 4 mg TID on 12/09 with minimal improvement. Cyproheptadine discontinued on 12/11 due to reported increased skin flushing and temperature spikes after dose.   · Resumed home Donepezil on 12/11 per neurology recs. Neurology signed off.            GERD (gastroesophageal reflux disease)    · Holding home PPI with aspiration precautions.        HLD (hyperlipidemia)    · No prior lipid panel in system.   · Holding home Atorvastatin.       Essential hypertension    · Controlled.   · Holding home BP medications as she is hypotensive  · Watch BP with  Cyproheptadine   · Vitals ordered Q4H.          VTE Risk Mitigation (From admission, onward)        Ordered     enoxaparin injection 40 mg  Daily      12/14/18 1052     Place sequential compression device  Until discontinued      12/06/18 1138     IP VTE LOW RISK PATIENT  Once      12/06/18 1138              eKlvin Mcadams MD  Department of Hospital Medicine   [facility]

## 2018-12-15 NOTE — ASSESSMENT & PLAN NOTE
· Improved.   · Differentials here include recurrent aspiration pneumonia secondary to dysphagia, bacteremia, and UTI.  · Admitted at OSH on 11/18 for high-grade SBO with recurrent bilateral pneumonia on CT scan on 11/26 at OSH.   · Hospital course at OSH complicated by symptoms of serotonin syndrome vs NMS with fever up to 107, status post Dantrolene, and contributed to Linezolid usage partly.   · Status post multiple courses of broad spectrum coverage including Linezolid, Cefepime, and Gentamicin; restarted on 12/4 with Vancomycin, Cefepime, and Diflucan after re-developing fevers. Urine culture at OSH positive for Candida.   · PICC line placed at OSH; unclear when inserted; will attempt to contact OSH as not clear in transfer paperwork.   · Admission labs notable for leukocytosis of 17 with 80% granulocytes, and lactic of 2.9; restarted Vanc/Zosyn on 12/6.  · HIV, RPR, JÚNIOR, and RF negative.   · CT A/P (12/7) without evidence of abdominal infection, showed expected post-operative changes, but showed possible RLL consolidation concerning for possible aspiration pneumonia vs pneumonitis.    · ID consulted given persistent fevers without source status post multiple broad spectrum antibiotics; recommending holding all antibiotics after 12/7 and re-culturing if patient spikes fever again. Based on CT findings, have low threshold for starting Unasyn if patient decompensates.   · Febrile 12/10 to 100.7; blood cultures, UA, and CXR repeated. UA suspicious for possible infection with 3+ leukocytes and 68 WBC's; culture pending.   · Leukocytosis resolved. Lactate 1.2 as of 12/7.  Day #6/7 of Unasyn for suspected aspiration PNA based on CT A/P, but also covering for suspected UA.   · Consulted neurology on 12/08 for mental status changes; considered serotonin syndrome as possible explanation for fever, vital signs, and mental status changes given medications at OSH more associated with that compared to NMS. Received  cyproheptadine from 12/09-12/10 with minimal improvement.   · Stable for transfer back to [place].

## 2018-12-15 NOTE — PLAN OF CARE
Problem: Patient Care Overview  Goal: Plan of Care Review  Outcome: Ongoing (interventions implemented as appropriate)  Turned Q2. [facility] refused to accept the patient back. Xray confirmed placement of tube. Marked at 49cm. MD placed order to use. TF restarted.

## 2018-12-15 NOTE — SUBJECTIVE & OBJECTIVE
Interval History:   Patient seen and examined, no acute events overnight  Unable to place PEG or open G-tube on 12/13/2018 due to adhesions  +F/BM via ostomy  Afebrile/VSS    Medications:  Continuous Infusions:  Scheduled Meds:   ampicillin-sulbactim (UNASYN) IVPB  3 g Intravenous Q6H    aspirin  81 mg Per NG tube Daily    donepezil  10 mg Per NG tube QHS    enoxaparin  40 mg Subcutaneous Daily    levothyroxine  25 mcg Per NG tube Daily    metoprolol  6.25 mg Per NG tube BID     PRN Meds:acetaminophen, dextrose 50%, dextrose 50%, glucagon (human recombinant), glucose, glucose, ondansetron, sodium chloride 0.9%     Review of patient's allergies indicates:   Allergen Reactions    Sulfa (sulfonamide antibiotics) Other (See Comments)     Severe reaction type unknown      Objective:     Vital Signs (Most Recent):  Temp: 97.6 °F (36.4 °C) (12/15/18 0756)  Pulse: 100 (12/15/18 0826)  Resp: 16 (12/15/18 0756)  BP: 109/70 (12/15/18 0756)  SpO2: (!) 94 % (12/15/18 0756) Vital Signs (24h Range):  Temp:  [97.1 °F (36.2 °C)-98.5 °F (36.9 °C)] 97.6 °F (36.4 °C)  Pulse:  [] 100  Resp:  [14-17] 16  SpO2:  [93 %-99 %] 94 %  BP: (109-137)/(55-84) 109/70     Weight: 53.2 kg (117 lb 4.6 oz)  Body mass index is 20.78 kg/m².    Intake/Output - Last 3 Shifts       12/13 0700 - 12/14 0659 12/14 0700 - 12/15 0659 12/15 0700 - 12/16 0659    P.O. 0 0     I.V. (mL/kg) 600 (12.2)      Other 0      NG/ 1480     IV Piggyback 200 400     Total Intake(mL/kg) 1350 (27.4) 1880 (35.3)     Urine (mL/kg/hr) 1500 (1.3) 1600 (1.3)     Drains 0 0     Stool 50 350     Total Output 1550 1950     Net -200 -70            Stool Occurrence 0 x            Physical Exam   Constitutional: She appears well-developed. No distress.   HENT:   Head: Normocephalic and atraumatic.   Cardiovascular: Normal rate and regular rhythm.   Pulmonary/Chest: Effort normal. No respiratory distress.   Abdominal:   soft, non distended, appropriate TTP  Surgical  incision - dressing in place - clean, dry and intact  Midline laparotomy scar. G and J tube scars. LLQ ileostomy and LUQ mucous fistula?       Significant Labs:  CBC:   Recent Labs   Lab 12/15/18  0524   WBC 9.59   RBC 2.96*   HGB 9.1*   HCT 29.0*   *   MCV 98   MCH 30.7   MCHC 31.4*     BMP:   Recent Labs   Lab 12/15/18  0524   *      K 4.1   CL 99   CO2 28   BUN 22   CREATININE 0.8   CALCIUM 9.0   MG 2.1     CMP:   Recent Labs   Lab 12/15/18  0524   *   CALCIUM 9.0      K 4.1   CO2 28   CL 99   BUN 22   CREATININE 0.8

## 2018-12-15 NOTE — PROGRESS NOTES
Pt seen and examined.    No issues since surgery. Feeding via small NGT.     Abd soft, incision intact    Unfortunately unable to place a feeding tube perc or open due to severe abdominal adhesions, frozen abdomen.   Limited options.  Please call if any questions or concerns

## 2018-12-15 NOTE — PROGRESS NOTES
"[facility]  General Surgery  Progress Note    Subjective:     History of Present Illness:  Ms Ornelas is a 69 yo F transferred from [facility] for fever of unknown source requesting ID and general surgery consultations for workup of fevers and placement of PEG in a patient with extensive surgical history.    From primary team note:     She has a PMH of severe dementia (baseline status unknown), Afib (rate controlled, not on anticoagulation), HTN, HLD, GERD, and hypothyroidism.     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      History obtained form OSH paper chart review, secondary to patient's mental status.      She was hospitalized at [facility] on 11/10/18 for suspected aspiration pneumonia; status post treatment with Vanc/Zosyn. She was noted to have abnormal barium swallow at that time.   PEG tube placement again discussed with family; they declined.    Discharged back to nursing home on 11/16.      She presented again on 11/18/18 for new onset of nausea and vomiting. On presentation, labs significant for BRODY (BUN 24, Cr 2.6). CT A/P showed high-grade small bowel obstruction and bilateral lower lobe consolidations. She was started on Linezolid and Cefepime for suspected bilateral aspiration pneumonia. Her SBO was managed by general surgery with conservative measures, including NG decompression, pain medications, and IVF.  She then began developing leukocytosis to 18, with new fevers (Tmax 101) " on 11/23; 5 day course of Gentamicin was added on 11/22.     On 11/26, she was noted to be passing gas and having bowel movements, but continued to have high NG bilious outptut. Repeat CT showed resolving SBO, but continued bilateral lower lobe consolidations. On 11/27, she spiked a fever to 107 (rectal probe) and developed new onset of extremity rigidity, hyperreflexia, and fine tremor, associated with worsening leukocytosis to 28, and lactic of 3. She was assessed to have serotonin syndrome secondary vs NMS due to possible combination of Linezolid, Dilaudid, Fentanyl, and Zofran. She received 140 mg of Dantrolene for possible NMS with symptom improvement; Linezolid discontinued. Patient's antibiotics switched to vancomycin and cefepime for aspiration pneumonitis, WBC was downtrending, and both antibiotics stopped on 12/3 after completed of 6-7 day antibiotic course.  Patient noted to be alert, conversant and oriented to self and with WBC downtrending, antibiotics stopped. Step down from ICU.     Overnight on 12/4, she had resumption of fevers (Tmax 101-102) with again features again of rigidity. Mental status change declined again with minimal  verbal response; patient noted to be ill appearing and diaphoretic.  Medications reviewed with pharmacy to ensure no active medications might be precipitating Serotonin Syndrome. CT head was unremarkable. Blood and urine cultures repeated. Hoover catheter exchanged. LP performed; gram stain negative, but other analyses pending. In discussion of case, decision made to watch patient overnight and re-eval on 12/5. On 12/5, her Vancomycin and Cefepime restarted, also on Diflucan for urine culture positive for Candida.      Additionally, she was noted to have severe oropharyngeal dysphagia. She failed 2 Modified barium swallows, failed again on 12/5; OSH recommendations were for non-oral feeding. She initially had J-tube after last abdominal surgery, but fell out without attempt  on repair. GI tried to place a PEG 1 year ago; but was unable.  IR would not want to place J-tube. General surgery at [facility] declined to operate secondary to complex anatomy.      NG tube was in for bulk of hospitalization for decompression and then tube feedings, but removed for concern of nasopharyngeal erosions.           Post-Op Info:  Procedure(s) (LRB):  aborted PEG insertion: abdominal wound exploration (N/A)  EGD (ESOPHAGOGASTRODUODENOSCOPY) (N/A)   2 Days Post-Op     Interval History:   Patient seen and examined, no acute events overnight  Unable to place PEG or open G-tube on 12/13/2018 due to adhesions  +F/BM via ostomy  Afebrile/VSS    Medications:  Continuous Infusions:  Scheduled Meds:   ampicillin-sulbactim (UNASYN) IVPB  3 g Intravenous Q6H    aspirin  81 mg Per NG tube Daily    donepezil  10 mg Per NG tube QHS    enoxaparin  40 mg Subcutaneous Daily    levothyroxine  25 mcg Per NG tube Daily    metoprolol  6.25 mg Per NG tube BID     PRN Meds:acetaminophen, dextrose 50%, dextrose 50%, glucagon (human recombinant), glucose, glucose, ondansetron, sodium chloride 0.9%     Review of patient's allergies indicates:   Allergen Reactions    Sulfa (sulfonamide antibiotics) Other (See Comments)     Severe reaction type unknown      Objective:     Vital Signs (Most Recent):  Temp: 97.6 °F (36.4 °C) (12/15/18 0756)  Pulse: 100 (12/15/18 0826)  Resp: 16 (12/15/18 0756)  BP: 109/70 (12/15/18 0756)  SpO2: (!) 94 % (12/15/18 0756) Vital Signs (24h Range):  Temp:  [97.1 °F (36.2 °C)-98.5 °F (36.9 °C)] 97.6 °F (36.4 °C)  Pulse:  [] 100  Resp:  [14-17] 16  SpO2:  [93 %-99 %] 94 %  BP: (109-137)/(55-84) 109/70     Weight: 53.2 kg (117 lb 4.6 oz)  Body mass index is 20.78 kg/m².    Intake/Output - Last 3 Shifts       12/13 0700 - 12/14 0659 12/14 0700 - 12/15 0659 12/15 0700 - 12/16 0659    P.O. 0 0     I.V. (mL/kg) 600 (12.2)      Other 0      NG/ 1480     IV Piggyback 200 400     Total  Intake(mL/kg) 1350 (27.4) 1880 (35.3)     Urine (mL/kg/hr) 1500 (1.3) 1600 (1.3)     Drains 0 0     Stool 50 350     Total Output 1550 1950     Net -200 -70            Stool Occurrence 0 x            Physical Exam   Constitutional: She appears well-developed. No distress.   HENT:   Head: Normocephalic and atraumatic.   Cardiovascular: Normal rate and regular rhythm.   Pulmonary/Chest: Effort normal. No respiratory distress.   Abdominal:   soft, non distended, appropriate TTP  Surgical incision - dressing in place - clean, dry and intact  Midline laparotomy scar. G and J tube scars. LLQ ileostomy and LUQ mucous fistula?       Significant Labs:  CBC:   Recent Labs   Lab 12/15/18  0524   WBC 9.59   RBC 2.96*   HGB 9.1*   HCT 29.0*   *   MCV 98   MCH 30.7   MCHC 31.4*     BMP:   Recent Labs   Lab 12/15/18  0524   *      K 4.1   CL 99   CO2 28   BUN 22   CREATININE 0.8   CALCIUM 9.0   MG 2.1     CMP:   Recent Labs   Lab 12/15/18  0524   *   CALCIUM 9.0      K 4.1   CO2 28   CL 99   BUN 22   CREATININE 0.8     Assessment/Plan:     Dysphagia    Ms Ornelas is a 69 yo F transferred from [facility] for fever of unknown source requesting ID and general surgery consultations for workup of fevers and placement of PEG in a patient with extensive surgical history. Gastrostomy tube attempted 12/13/18    - unable to place gastrostomy tube percutaneous or open  - may place dobhoff/gomez for long term feeding  - will sign off please call with questions or concerns         Madison Quiroz MD  General Surgery  [facility]

## 2018-12-15 NOTE — ASSESSMENT & PLAN NOTE
Ms Ornelas is a 67 yo F transferred from [facility] for fever of unknown source requesting ID and general surgery consultations for workup of fevers and placement of PEG in a patient with extensive surgical history. Gastrostomy tube attempted 12/13/18    - unable to place gastrostomy tube percutaneous or open  - may place dobhoff/gomez for long term feeding  - will sign off please call with questions or concerns

## 2018-12-16 LAB
ANION GAP SERPL CALC-SCNC: 12 MMOL/L
BASOPHILS # BLD AUTO: 0.05 K/UL
BASOPHILS NFR BLD: 0.5 %
BUN SERPL-MCNC: 19 MG/DL
CALCIUM SERPL-MCNC: 9.2 MG/DL
CHLORIDE SERPL-SCNC: 99 MMOL/L
CO2 SERPL-SCNC: 28 MMOL/L
CREAT SERPL-MCNC: 0.7 MG/DL
DIFFERENTIAL METHOD: ABNORMAL
EOSINOPHIL # BLD AUTO: 0.1 K/UL
EOSINOPHIL NFR BLD: 1.1 %
ERYTHROCYTE [DISTWIDTH] IN BLOOD BY AUTOMATED COUNT: 19 %
EST. GFR  (AFRICAN AMERICAN): >60 ML/MIN/1.73 M^2
EST. GFR  (NON AFRICAN AMERICAN): >60 ML/MIN/1.73 M^2
GLUCOSE SERPL-MCNC: 113 MG/DL
HCT VFR BLD AUTO: 28.6 %
HGB BLD-MCNC: 9.2 G/DL
IMM GRANULOCYTES # BLD AUTO: 0.13 K/UL
IMM GRANULOCYTES NFR BLD AUTO: 1.2 %
LYMPHOCYTES # BLD AUTO: 2.2 K/UL
LYMPHOCYTES NFR BLD: 20 %
MAGNESIUM SERPL-MCNC: 2.1 MG/DL
MCH RBC QN AUTO: 32.2 PG
MCHC RBC AUTO-ENTMCNC: 32.2 G/DL
MCV RBC AUTO: 100 FL
MONOCYTES # BLD AUTO: 1.3 K/UL
MONOCYTES NFR BLD: 12.1 %
NEUTROPHILS # BLD AUTO: 7.1 K/UL
NEUTROPHILS NFR BLD: 65.1 %
NRBC BLD-RTO: 0 /100 WBC
PHOSPHATE SERPL-MCNC: 2.6 MG/DL
PLATELET # BLD AUTO: 406 K/UL
PMV BLD AUTO: 12.3 FL
POTASSIUM SERPL-SCNC: 4.5 MMOL/L
RBC # BLD AUTO: 2.86 M/UL
SODIUM SERPL-SCNC: 139 MMOL/L
WBC # BLD AUTO: 10.84 K/UL

## 2018-12-16 PROCEDURE — 80048 BASIC METABOLIC PNL TOTAL CA: CPT

## 2018-12-16 PROCEDURE — 83735 ASSAY OF MAGNESIUM: CPT

## 2018-12-16 PROCEDURE — 36415 COLL VENOUS BLD VENIPUNCTURE: CPT

## 2018-12-16 PROCEDURE — 84100 ASSAY OF PHOSPHORUS: CPT

## 2018-12-16 PROCEDURE — 99232 PR SUBSEQUENT HOSPITAL CARE,LEVL II: ICD-10-PCS | Mod: GC,,, | Performed by: HOSPITALIST

## 2018-12-16 PROCEDURE — 99232 SBSQ HOSP IP/OBS MODERATE 35: CPT | Mod: GC,,, | Performed by: HOSPITALIST

## 2018-12-16 PROCEDURE — 85025 COMPLETE CBC W/AUTO DIFF WBC: CPT

## 2018-12-16 PROCEDURE — 63600175 PHARM REV CODE 636 W HCPCS: Performed by: STUDENT IN AN ORGANIZED HEALTH CARE EDUCATION/TRAINING PROGRAM

## 2018-12-16 PROCEDURE — 25000003 PHARM REV CODE 250: Performed by: STUDENT IN AN ORGANIZED HEALTH CARE EDUCATION/TRAINING PROGRAM

## 2018-12-16 PROCEDURE — 63600175 PHARM REV CODE 636 W HCPCS: Performed by: HOSPITALIST

## 2018-12-16 PROCEDURE — 20600001 HC STEP DOWN PRIVATE ROOM

## 2018-12-16 RX ADMIN — DONEPEZIL HYDROCHLORIDE 10 MG: 5 TABLET, FILM COATED ORAL at 08:12

## 2018-12-16 RX ADMIN — AMPICILLIN SODIUM AND SULBACTAM SODIUM 3 G: 2; 1 INJECTION, POWDER, FOR SOLUTION INTRAMUSCULAR; INTRAVENOUS at 04:12

## 2018-12-16 RX ADMIN — Medication 6.25 MG: at 08:12

## 2018-12-16 RX ADMIN — Medication 6.25 MG: at 09:12

## 2018-12-16 RX ADMIN — AMPICILLIN SODIUM AND SULBACTAM SODIUM 3 G: 2; 1 INJECTION, POWDER, FOR SOLUTION INTRAMUSCULAR; INTRAVENOUS at 09:12

## 2018-12-16 RX ADMIN — ENOXAPARIN SODIUM 40 MG: 100 INJECTION SUBCUTANEOUS at 04:12

## 2018-12-16 RX ADMIN — LEVOTHYROXINE SODIUM 25 MCG: 25 TABLET ORAL at 06:12

## 2018-12-16 RX ADMIN — ASPIRIN 81 MG CHEWABLE TABLET 81 MG: 81 TABLET CHEWABLE at 09:12

## 2018-12-16 NOTE — ASSESSMENT & PLAN NOTE
· Improved.   · Differentials here include recurrent aspiration pneumonia secondary to dysphagia, bacteremia, and UTI.  · Admitted at OSH on 11/18 for high-grade SBO with recurrent bilateral pneumonia on CT scan on 11/26 at OSH.   · Hospital course at OSH complicated by symptoms of serotonin syndrome vs NMS with fever up to 107, status post Dantrolene, and contributed to Linezolid usage partly.   · Status post multiple courses of broad spectrum coverage including Linezolid, Cefepime, and Gentamicin; restarted on 12/4 with Vancomycin, Cefepime, and Diflucan after re-developing fevers. Urine culture at OSH positive for Candida.   · PICC line placed at OSH; unclear when inserted; will attempt to contact OSH as not clear in transfer paperwork.   · Admission labs notable for leukocytosis of 17 with 80% granulocytes, and lactic of 2.9; restarted Vanc/Zosyn on 12/6.  · HIV, RPR, JÚNIOR, and RF negative.   · CT A/P (12/7) without evidence of abdominal infection, showed expected post-operative changes, but showed possible RLL consolidation concerning for possible aspiration pneumonia vs pneumonitis.    · ID consulted given persistent fevers without source status post multiple broad spectrum antibiotics; recommending holding all antibiotics after 12/7 and re-culturing if patient spikes fever again. Based on CT findings, have low threshold for starting Unasyn if patient decompensates.   · Febrile 12/10 to 100.7; blood cultures, UA, and CXR repeated. UA suspicious for possible infection with 3+ leukocytes and 68 WBC's; culture pending.   · Leukocytosis resolved. Lactate 1.2 as of 12/7.  Day #7/7 of Unasyn for suspected aspiration PNA based on CT A/P, but also covering for suspected UA.   · Consulted neurology on 12/08 for mental status changes; considered serotonin syndrome as possible explanation for fever, vital signs, and mental status changes given medications at OSH more associated with that compared to NMS. Received  cyproheptadine from 12/09-12/10 with minimal improvement.   · Stable for transfer back to [place].

## 2018-12-16 NOTE — PLAN OF CARE
Problem: Patient Care Overview  Goal: Plan of Care Review  Outcome: Ongoing (interventions implemented as appropriate)  VSS. TF @ 40mL/hr.  Dressing C/D/I. Turned q2h. Abdominal binder in place. Safety maintained. Will continue to monitor.

## 2018-12-16 NOTE — PLAN OF CARE
Problem: Adult Inpatient Plan of Care  Goal: Plan of Care Review  Outcome: Ongoing (interventions implemented as appropriate)  POC ; Alert x1 nods gestures : NGT  Left nare : tolerating tube feedings ; afebrile : continues with iv abx: VSS: will cont to monitor.

## 2018-12-16 NOTE — SUBJECTIVE & OBJECTIVE
Interval History: Patient evaluated at bedside this morning. She is awake, alert, and responsive to questions this morning, but with disordered responses.     Review of Systems   Unable to perform ROS: Dementia     Objective:     Vital Signs (Most Recent):  Temp: 97.7 °F (36.5 °C) (12/16/18 0756)  Pulse: 103 (12/16/18 0756)  Resp: 16 (12/16/18 0756)  BP: 122/74 (12/16/18 0756)  SpO2: 95 % (12/16/18 0756) Vital Signs (24h Range):  Temp:  [97.4 °F (36.3 °C)-98 °F (36.7 °C)] 97.7 °F (36.5 °C)  Pulse:  [] 103  Resp:  [16-18] 16  SpO2:  [95 %-99 %] 95 %  BP: (107-122)/(59-74) 122/74     Weight: 53.2 kg (117 lb 4.6 oz)  Body mass index is 20.78 kg/m².    Intake/Output Summary (Last 24 hours) at 12/16/2018 0901  Last data filed at 12/16/2018 0600  Gross per 24 hour   Intake 2760 ml   Output 1600 ml   Net 1160 ml      Physical Exam   Constitutional: She is oriented to person, place, and time. She appears well-developed and well-nourished. No distress.   HENT:   Head: Normocephalic and atraumatic.   Mouth/Throat: Uvula is midline, oropharynx is clear and moist and mucous membranes are normal. Abnormal dentition. No dental abscesses. No tonsillar exudate.   Eyes: Conjunctivae are normal. Pupils are equal, round, and reactive to light. No scleral icterus.   Neck: Neck supple. No JVD present.   Cardiovascular: Regular rhythm, normal heart sounds and normal pulses. Tachycardia present.   No murmur heard.  Pulmonary/Chest: Effort normal and breath sounds normal. No respiratory distress. She has no decreased breath sounds. She has no rales.   Abdominal: Soft. Normal appearance and bowel sounds are normal. She exhibits no distension. There is no tenderness.   Abdominal binder loosely in place. Intact bandage near colostomy site. Colostomy bag in RLQ; multiple well-healed previous abdominal surgery scars.    Genitourinary:   Genitourinary Comments: Hoover inserted.    Musculoskeletal:   There is atrophy of the muscles of the  bilateral lower extremities distal to the knee.    Neurological: She is alert and oriented to person, place, and time. She displays no tremor. No cranial nerve deficit. GCS eye subscore is 4. GCS verbal subscore is 3. GCS motor subscore is 6. She displays no Babinski's sign on the right side. She displays no Babinski's sign on the left side.   There is less contracture of the bilateral feel and upper extremities at the elbow with less rigidity of movement at those joints.    Skin: Skin is warm and dry. No bruising and no rash noted. She is not diaphoretic.   There is reduced flushing around the lower face and anterior neck.        Significant Labs:   CBC:   Recent Labs   Lab 12/15/18  0524 12/16/18  0435   WBC 9.59 10.84   HGB 9.1* 9.2*   HCT 29.0* 28.6*   * 406*     CMP:   Recent Labs   Lab 12/15/18  0524 12/16/18  0435    139   K 4.1 4.5   CL 99 99   CO2 28 28   * 113*   BUN 22 19   CREATININE 0.8 0.7   CALCIUM 9.0 9.2   ANIONGAP 11 12   EGFRNONAA >60.0 >60.0       Significant Imaging: I have reviewed all pertinent imaging results/findings within the past 24 hours.  I have reviewed and interpreted all pertinent imaging results/findings within the past 24 hours.

## 2018-12-16 NOTE — ASSESSMENT & PLAN NOTE
· Noted to have severe oropharyngeal dysphagia.   · Failed 2 Modified barium swallows, failed again on 12/5; OSH recommendations were for non-oral feeding. Most recent barium swallow assessed as silent aspirator.   · She initially had J-tube after last abdominal surgery, but fell out without attempt on repair.   · GI tried to place a PEG 1 year ago; but was unable.   · IR would not want to place J-tube.   · General surgery at [facility] declined to operate secondary to complex anatomy.   · NG tube was in for bulk of hospitalization for decompression and then tube feedings, but removed for concern of nasopharyngeal erosions.   · Speech evaluated here; recommended strict NPO with aspiration precautions.   · Consulted dietary/nutrition: Tube feed recommendations placed for initiation. Tube feeds at goal, but will need to watch for refeeding syndrome. Mg and P with daily labs.   · General surgery evaluated patient afternoon of 12/7; after discussion with family will proceed with open PEG tube placement on 12/13. However, surgeons were unable to access gastric wall due to extent of adhesions in the patient's abdomen. Will likely proceed with John-feeding tube, could be done at [facility].   · Plan for repeat modified barium swallow by SLP on 12/17.

## 2018-12-16 NOTE — ASSESSMENT & PLAN NOTE
· Resolved. Noted on 12/12 initially.   · Likely secondary to over-supplementation with K and phosphorous supplementation containing K.   · EKG ordered; no T wave abnormalities.   · Trending K daily with BMP.

## 2018-12-16 NOTE — PLAN OF CARE
Problem: Patient Care Overview  Goal: Plan of Care Review  Outcome: Ongoing (interventions implemented as appropriate)  VSS. Pt speaking a little more today. TF @ 40mL/hr. Water boluses 3x/day. No acute changes. Will continue to monitor.

## 2018-12-16 NOTE — ASSESSMENT & PLAN NOTE
· Controlled.   · Holding home BP medications.  · Watch BP with Cyproheptadine   · Vitals ordered Q4H.

## 2018-12-17 LAB
ANION GAP SERPL CALC-SCNC: 14 MMOL/L
BASOPHILS # BLD AUTO: 0.08 K/UL
BASOPHILS NFR BLD: 0.6 %
BUN SERPL-MCNC: 17 MG/DL
CALCIUM SERPL-MCNC: 9.5 MG/DL
CHLORIDE SERPL-SCNC: 100 MMOL/L
CO2 SERPL-SCNC: 24 MMOL/L
CREAT SERPL-MCNC: 0.8 MG/DL
DIFFERENTIAL METHOD: ABNORMAL
EOSINOPHIL # BLD AUTO: 0.2 K/UL
EOSINOPHIL NFR BLD: 1.3 %
ERYTHROCYTE [DISTWIDTH] IN BLOOD BY AUTOMATED COUNT: 18.8 %
EST. GFR  (AFRICAN AMERICAN): >60 ML/MIN/1.73 M^2
EST. GFR  (NON AFRICAN AMERICAN): >60 ML/MIN/1.73 M^2
GLUCOSE SERPL-MCNC: 121 MG/DL
HCT VFR BLD AUTO: 30.2 %
HGB BLD-MCNC: 9.3 G/DL
IMM GRANULOCYTES # BLD AUTO: 0.47 K/UL
IMM GRANULOCYTES NFR BLD AUTO: 3.5 %
LYMPHOCYTES # BLD AUTO: 3.3 K/UL
LYMPHOCYTES NFR BLD: 24.6 %
MAGNESIUM SERPL-MCNC: 1.8 MG/DL
MCH RBC QN AUTO: 30.8 PG
MCHC RBC AUTO-ENTMCNC: 30.8 G/DL
MCV RBC AUTO: 100 FL
MONOCYTES # BLD AUTO: 1.4 K/UL
MONOCYTES NFR BLD: 10.5 %
NEUTROPHILS # BLD AUTO: 8 K/UL
NEUTROPHILS NFR BLD: 59.5 %
NRBC BLD-RTO: 0 /100 WBC
PHOSPHATE SERPL-MCNC: 2.3 MG/DL
PLATELET # BLD AUTO: 435 K/UL
PMV BLD AUTO: 12.5 FL
POTASSIUM SERPL-SCNC: 4.2 MMOL/L
RBC # BLD AUTO: 3.02 M/UL
SODIUM SERPL-SCNC: 138 MMOL/L
WBC # BLD AUTO: 13.49 K/UL

## 2018-12-17 PROCEDURE — 83735 ASSAY OF MAGNESIUM: CPT

## 2018-12-17 PROCEDURE — 99238 PR HOSPITAL DISCHARGE DAY,<30 MIN: ICD-10-PCS | Mod: GC,,, | Performed by: HOSPITALIST

## 2018-12-17 PROCEDURE — 92611 MOTION FLUOROSCOPY/SWALLOW: CPT

## 2018-12-17 PROCEDURE — 36415 COLL VENOUS BLD VENIPUNCTURE: CPT

## 2018-12-17 PROCEDURE — 63600175 PHARM REV CODE 636 W HCPCS: Performed by: STUDENT IN AN ORGANIZED HEALTH CARE EDUCATION/TRAINING PROGRAM

## 2018-12-17 PROCEDURE — 85025 COMPLETE CBC W/AUTO DIFF WBC: CPT

## 2018-12-17 PROCEDURE — 25000003 PHARM REV CODE 250: Performed by: STUDENT IN AN ORGANIZED HEALTH CARE EDUCATION/TRAINING PROGRAM

## 2018-12-17 PROCEDURE — 84100 ASSAY OF PHOSPHORUS: CPT

## 2018-12-17 PROCEDURE — 80048 BASIC METABOLIC PNL TOTAL CA: CPT

## 2018-12-17 PROCEDURE — 99238 HOSP IP/OBS DSCHRG MGMT 30/<: CPT | Mod: GC,,, | Performed by: HOSPITALIST

## 2018-12-17 PROCEDURE — 20600001 HC STEP DOWN PRIVATE ROOM

## 2018-12-17 RX ORDER — METOPROLOL TARTRATE 25 MG/1
TABLET, FILM COATED ORAL
Qty: 60 TABLET | Refills: 11
Start: 2018-12-17

## 2018-12-17 RX ADMIN — ENOXAPARIN SODIUM 40 MG: 100 INJECTION SUBCUTANEOUS at 05:12

## 2018-12-17 RX ADMIN — Medication 6.25 MG: at 09:12

## 2018-12-17 RX ADMIN — DONEPEZIL HYDROCHLORIDE 10 MG: 5 TABLET, FILM COATED ORAL at 09:12

## 2018-12-17 RX ADMIN — LEVOTHYROXINE SODIUM 25 MCG: 25 TABLET ORAL at 06:12

## 2018-12-17 RX ADMIN — ASPIRIN 81 MG CHEWABLE TABLET 81 MG: 81 TABLET CHEWABLE at 09:12

## 2018-12-17 NOTE — CONSULTS
Palliative Care Acknowledgement of Consult - .date    Consult received. Palliative Care Provider: AYANNA Molina will touch base with team prior to seeing patient. Full consult to follow.    Thank you for allowing us to be a part of the care of this patient.          Ignacia Coates LCSW, ACHP-SW

## 2018-12-17 NOTE — HPI
"Ms. Ornelas is a  67 yo lady  transferred  To [facility] as [place]  With c/o r fever, requesting ID and general surgery consultations. PMH of  severe dementia (baseline status unknown), Afib (rate controlled, not on anticoagulation), HTN, HLD, GERD, and hypothyroidism. 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    Found  have severe oropharyngeal dysphagia. Unsuccessful  Modified barium swallow  X3. recommendations given for  non-oral feeding.   Had J-tube after last abdominal surgery, which fell out without attempt on repair. GI tried to place a PEG 1 year ago; but was unable.  IR would not want to place J-tube. General surgery at [facility] declined to operate secondary to complex anatomy.  Had prolonged NG tube  for decompression and then tube feedings,  removed for concern of nasopharyngeal erosions.    Palliative medicine consulted for goals of care if she continue to have unsafe route for feeding.              "

## 2018-12-17 NOTE — PLAN OF CARE
[facility]     Department of Hospital Medicine     [address] [place] [address]     [phone] after hours  [phone] fax       NURSING HOME ORDERS    12/17/2018    Admit to Nursing Home:  Regular Bed          Diagnoses:  Active Hospital Problems    Diagnosis  POA    *Fever [R50.9]  Yes    Hyperkalemia [E87.5]  Unknown    Aspiration pneumonia [J69.0]  Unknown    Urinary tract infection associated with catheterization of urinary tract [T83.511A, N39.0]  Unknown    Serotonin syndrome, presumed [G25.79]  Unknown    Generalized hyperreflexia [R29.2]  Unknown    Essential hypertension [I10]  Yes    HLD (hyperlipidemia) [E78.5]  Yes    GERD (gastroesophageal reflux disease) [K21.9]  Yes    Dementia without behavioral disturbance [F03.90]  Yes    Atrial fibrillation [I48.91]  Yes    Hypothyroidism [E03.9]  Yes    Dysphagia [R13.10]  Yes    Hypernatremia [E87.0]  Yes      Resolved Hospital Problems   No resolved problems to display.       Patient is homebound due to: Dementia   Fever    Allergies:  Review of patient's allergies indicates:   Allergen Reactions    Sulfa (sulfonamide antibiotics) Other (See Comments)     Severe reaction type unknown        Vitals:      Routine, once monthly    Diet: Diet Dysphagia Mechanical Soft:   Nectar thick pureed consistency    Acitivities:    - Up in a chair each morning as tolerated    LABS:  Per facility protocol    Nursing Precautions:   - Aspiration precautions:             - Total assistance with meals            -  Upright 90 degrees befor during and after meals             -  Suction at bedside          - Fall precautions per nursing home protocol   - Seizure precaution per senior living protocol   - Decubitus precautions:        -  for positioning   - Pressure reducing foam mattress   - Turn patient every two hours. Use wedge pillows to anchor patient    CONSULTS:    Physical Therapy to evaluate and  treat     Occupational Therapy to evaluate and treat     Speech Therapy  to evaluate and treat     Nutrition to evaluate and recommend diet        MISCELLANEOUS CARE:               Colostomy Care:  Empty bag every shift and prn                                             Change and clean site every 48 hours       Hoover Care: Empty Hoover bag every shift.  Change Hoover every month     Routine Skin for Bedridden Patients:  Apply moisture barrier cream to all    skin folds and wet areas in perineal area daily and after baths and                           all bowel movements.                   Medications: Discontinue all previous medication orders, if any. See new list below.      Min Ornelasa   Home Medication Instructions KATLYN:[id number]    Printed on:12/17/18 0673   Medication Information                      aspirin 81 MG Chew  Take 81 mg by mouth once daily.              atorvastatin (LIPITOR) tablet  Take 5 mg by mouth once daily.              donepezil (ARICEPT) 10 MG tablet  Take 10 mg by mouth every evening.             levothyroxine (SYNTHROID) 25 MCG tablet  Take 25 mcg by mouth once daily.             metoprolol tartrate (LOPRESSOR) 25 MG tablet  Please take 1/4 tablet (6.25 mg) PO BID             multivitamin (ONE DAILY MULTIVITAMIN) per tablet  Take 1 tablet by mouth once daily.             pantoprazole (PROTONIX) 40 MG tablet  Take 40 mg by mouth once daily.                     _________________________________  Juan Manuel Daniels MD  12/17/2018

## 2018-12-17 NOTE — CARE UPDATE
Palliative care consult received.  Chart reviewed and patient discussed with Dr. Sinclair and Dr. Barron.  Mrs. Ornelas has successfully completed swallow study and as per family goals will be returning to [facility].  Primary team  making referrals.      Thank you for opportunity to participate in Mrs. Silverio's care.  Palliative care will sign off.  Please re-consult as needed.

## 2018-12-17 NOTE — PLAN OF CARE
Problem: SLP Goal  Goal: SLP Goal  Speech Language Pathology Goals  Goals expected to be met by 12/24  1. Pt will tolerate puree & nectar thick liquids without s/s aspiration  2. Ongoing swallow assessment to determine least restrictive PO consistencies  3. Dysphagia exercises given min to mod A  Modified Barium Swallow Study completed. See note for details.     Sonal Little MS, CCC-SLP  Speech Language Pathologist  Pager: [phone]  12/17/2018

## 2018-12-17 NOTE — PROCEDURES
Modified Barium Swallow    Patient Name:  Silvia Ornelas   MRN:  [id number]    Recommendations:     Recommendations:                General Recommendations:  Dysphagia therapy  Diet recommendations:  Puree, Nectar Thick   Aspiration Precautions: 1 bite/sip at a time, Alternating bites/sips, Assistance with meals to ensure following precautions & strategies, Feed only when awake/alert, HOB to 90 degrees, Meds crushed in puree, Monitor for s/s of aspiration, No straws, Small bites/sips and Strict aspiration precautions   Assistance for thickening liquids  Recs d/w team   General Precautions: Standard, aspiration, pureed diet, nectar thick, fall    Referral     Reason for Referral  Patient was referred for a Modified Barium Swallow Study to assess the efficiency of his/her swallow function, rule out aspiration and make recommendations regarding safe dietary consistencies, effective compensatory strategies, and safe eating environment.     Diagnosis: Fever     History:     Past Medical History:   Diagnosis Date    Atrial fibrillation     Dementia     GERD (gastroesophageal reflux disease)     HLD (hyperlipidemia)     Hypertension     Hypothyroid     Thyroid disease        Objective:     Current Respiratory Status: 12/17/18    Alert: yes    Cooperative: yes    Follows Directions: yes   Pt is very [place]    Visualization  · Patient was seen in the lateral view    Oral Peripheral Examination  · Oral Musculature: unable to assess due to poor participation/comprehension    Consistencies Assessed  · Thin 1 tsp via spoon x2, small cup sip x1 unmeasured presented by SLP  · Nectar thick 1 tsp via spoon x2, via single cup sips self fed x4, via straw consecutive sips x4 (~6oz total of nectar barium)   · Puree via tsp x3  · Solids 1/2 saltine cracker    Oral Preparation/Oral Phase  · Poor bolus formation and control  · prolonged mastication; partially masticated bolus removed from oral cavity  · Decreased base of tongue  mobility  · Premature spillage    Pharyngeal Phase   Pharyngeal phase c/b premature spillage, delayed initiation of pharyngeal swallow, decreased base of tongue retraction, mildly decreased hyolaryngeal excursion & rise & decreased epiglottic inversion (appears limited partly 2/2 impinging NGT) & decreased pharyngeal constriction  Thin: flash penetration during swallow via tsp sips, flash penetration during swallow & after swallow from mild vallecular stasis & mild scattered pharyngeal stasis when additional spontaneous swallows completed, mild vallecular stasis remained despite cued additional swallow  Nectar: flash penetration during tsp sip x1 & during cup sip x1, no further penetration/aspiration on all remaining trials. Mild vallecular stasis, not cleared with cued double swallows but does not worsen with each presentation  Puree: no penetration/aspiration. Mild vallecular stasis- does not clear with cued double swallow, does clear with sip of nectar  Solid: small amount of solid swallowed, no penetration/aspiration yet very delayed initiation of swallow. Majority of partially masticated bolus removed from oral cavity.     Cervical Esophageal Phase  · UES appeared to accommodate all bolus types without stasis or retrograde movement observed     Assessment:     Impressions  ·   Patient demonstrates mild Oropharyngeal dysphagia     Prognosis: Good    Barriers:  · Cognitive status  · hard of hearing    Plan  ST service will follow up to ensure tolerance, ongoing assessment to determine least restrictive PO consistencies, dysphagia exercises, etc.     Education  Results were discussed with Medical Team who was in agreement with plan.     Goals:   Multidisciplinary Problems     SLP Goals        Problem: SLP Goal    Goal Priority Disciplines Outcome   SLP Goal   (Resolved)     SLP Resolved for Upgrade   Description:  Speech Language Pathology Goals  Goals expected to be met by 12/13:  1. Patient will participate in  ongoing swallow assessment to determine least restrictive diet recommendations.              Problem: SLP Goal    Goal Priority Disciplines Outcome   SLP Goal     SLP    Description:  Speech Language Pathology Goals  Goals expected to be met by 12/24  1. Pt will tolerate puree & nectar thick liquids without s/s aspiration  2. Ongoing swallow assessment to determine least restrictive PO consistencies  3. Dysphagia exercises given min to mod A              Multidisciplinary Problems (Resolved)        Problem: SLP Goal    Goal Priority Disciplines Outcome   SLP Goal   (Resolved)     SLP Resolved for Upgrade                   Plan:   · Patient to be seen:  Therapy Frequency: 5 x/week   · Plan of Care expires:  01/06/18  · Plan of Care reviewed with:  patient        Discharge recommendations:  (tbd)     Time Tracking:   SLP Treatment Date:   12/17/18  Speech Start Time:  0930  Speech Stop Time:  1000     Speech Total Time (min):  30 min    Sonal Little CCC-SLP  12/17/2018

## 2018-12-17 NOTE — DISCHARGE SUMMARY
"[facility] Hospital Medicine  Discharge Summary      Patient Name: Silvia Ornelas  MRN: [id number]  Admission Date: 12/6/2018  Hospital Length of Stay: 11 days  Discharge Date and Time:  12/17/2018 1:56 PM  Attending Physician: Madison Barron MD   Discharging Provider: Juan Manuel Daniels MD  Primary Care Provider: Primary [facility] Medicine Team: [facility] 5 Juan Manuel Daniels MD    HPI:   This is a 68 year old female transferred from [facility] for fever, requesting ID and general surgery consultations. She has a PMH of severe dementia (baseline status unknown), Afib (rate controlled, not on anticoagulation), HTN, HLD, GERD, and hypothyroidism. 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     History obtained form OS paper chart review, secondary to patient's mental status.     She was hospitalized at [facility] on 11/10/18 for suspected aspiration pneumonia; status post treatment with Vanc/Zosyn. She was noted to have abnormal barium swallow at that time; PEG tube placement again discussed with family; they declined. Discharged back to nursing home on 11/16.     She presented again on 11/18/18 for new onset of nausea and vomiting. On presentation, labs significant for BRODY (BUN 24, Cr 2.6). CT A/P showed high-grade small bowel obstruction and bilateral lower lobe consolidations. She was started on Linezolid and Cefepime for suspected bilateral aspiration pneumonia. Her SBO was managed by " general surgery with conservative measures, including NG decompression, pain medications, and IVF.  She then began developing leukocytosis to 18, with new fevers (Tmax 101) on 11/23; 5 day course of Gentamicin was added on 11/22. On 11/26, she was noted to be passing gas and having bowel movements, but continued to have high NG bilious outptut. Repeat CT showed resolving SBO, but continued bilateral lower lobe consolidations. On 11/27, she spiked a fever to 107 (rectal probe) and developed new onset of extremity rigidity, hyperreflexia, and fine tremor, associated with worsening leukocytosis to 28, and lactic of 3. She was assessed to have serotonin syndrome secondary vs NMS due to possible combination of Linezolid, Dilaudid, Fentanyl, and Zofran. She received 140 mg of Dantrolene for possible NMS with symptom improvement; Linezolid discontinued. Patient's antibiotics switched to vancomycin and cefepime for aspiration pneumonitis, WBC was downtrending, and both antibiotics stopped on 12/3 after completed of 6-7 day antibiotic course.  Patient noted to be alert, conversant and oriented to self and with WBC downtrending, antibiotics stopped. Step down from ICU.    Overnight on 12/4, she had resumption of fevers (Tmax 101-102) with again features again of rigidity. Mental status change declined again with minimal  verbal response; patient noted to be ill appearing and diaphoretic.  Medications reviewed with pharmacy to ensure no active medications might be precipitating Serotonin Syndrome. CT head was unremarkable. Blood and urine cultures repeated. Hoover catheter exchanged. LP performed; gram stain negative, but other analyses pending. In discussion of case, decision made to watch patient overnight and re-eval on 12/5. On 12/5, her Vancomycin and Cefepime restarted, also on Diflucan for urine culture positive for Candida.     Additionally, she was noted to have severe oropharyngeal dysphagia. She failed 2 Modified  barium swallows, failed again on 12/5; OSH recommendations were for non-oral feeding. She initially had J-tube after last abdominal surgery, but fell out without attempt on repair. GI tried to place a PEG 1 year ago; but was unable.  IR would not want to place J-tube. General surgery at [facility] declined to operate secondary to complex anatomy.  NG tube was in for bulk of hospitalization for decompression and then tube feedings, but removed for concern of nasopharyngeal erosions.      Procedure(s) (LRB):  aborted PEG insertion: abdominal wound exploration (N/A)  EGD (ESOPHAGOGASTRODUODENOSCOPY) (N/A)      Hospital Course:   Admission labs notable for leukocytosis of 17, lactic of 2.4, sodium of 154. Restarted on Vanc/Zosyn. ICU consulted for hypernatremia management; recommended D5W correction based on free water deficit. Started on 14 hours of D5W at 70 mL/hr. Overnight, patient noted to be tachycardic to 120, with temperature spike to 101.4. Unable to give Tylenol because of NPO/aspiratioin precautions; defervescence without intervention. Leukocytosis down to 13, lactic down to 1.2. Sodium improved to 151; restarted on D5W 70 mL for 10 hours. Consulted general surgery and ID for further recommendations. Per ID, discontinued all antibiotics. Ordered HIV, RPR, EEG, JÚNIOR, and RF. Ordered CT A/P: no abscess, expected post-operative anatomy changes, but showing RLL consolidation possibly aspiration pneumonia. Patient remains awake, alert, but with disordered and delayed response to questioning. Tachycardia improved to lower 100's. Leukocytosis resolved (WBC 10). Sodium normalized to 145. NG restarted with tube feeds and some home medications. Pending surgery decision on possible PEG after they review CT findings. Consulted neurology because of concern of mental status changes reported by family over hospital course. Ordered MRI with/without contrast. Considering serotonin syndrome as possible explanation for fevers,  tachycardia, mental status, and contractures on exam. MRI unremarkable. Started on Cyproheptadine. New leukocytosis, Started Unasyn, based on RLL consolidation on CT A/P. General surgery discussed PEG with family. Changed Bisoprolol to Metoprolol for tachycardia management. Cyproheptadine discontinued due to concerns of skin flushing and fever after doses given. Patient more verbal today with staff. Scheduled for open G-tube placement in OR today; but procedure was unsuccessful as surgery noted the extent of adhesions in patient's abdomen completely prevents safe access to gastric wall for G-tube placement and greatly increases her risk of residual bowel perforation. Will likely now reconsider John-feeding tube placement before anticipated transfer back to [place]. Patient remained stable and responded to all question with a yes and no answer. She stated she was not in any pain.extensive talk with daughter yesterday about feeding her and for now they would like everything done. She is stable for transfer back to [place]. NG replaced; re-initiated on tube feeds. Unasyn course completed. Patient passed her barium swallow. Mental status at baseline. Discharged back to her nursing home.       Review of Systems   Unable to perform ROS: Dementia     Physical Exam   Constitutional: She is oriented to person, place, and time. She appears well-developed and well-nourished. No distress.   HENT:   Head: Normocephalic and atraumatic.   Mouth/Throat: Uvula is midline, oropharynx is clear and moist and mucous membranes are normal. Abnormal dentition. No dental abscesses. No tonsillar exudate.   Eyes: Conjunctivae are normal. Pupils are equal, round, and reactive to light. No scleral icterus.   Neck: Neck supple. No JVD present.   Cardiovascular: Regular rhythm, normal heart sounds and normal pulses.  No murmur heard.  Pulmonary/Chest: Effort normal and breath sounds normal. No respiratory distress. She has no decreased breath  sounds. She has no rales.   Abdominal: Soft. Normal appearance and bowel sounds are normal. She exhibits no distension. There is no tenderness.   Abdominal binder loosely in place. Intact bandage near colostomy site. Colostomy bag in RLQ; multiple well-healed previous abdominal surgery scars.    Musculoskeletal:   There is atrophy of the muscles of the bilateral lower extremities distal to the knee.    Neurological: She is alert and oriented to person, place, and time. She displays no tremor.   There is less contracture of the bilateral feel and upper extremities at the elbow with less rigidity of movement at those joints.    Skin: Skin is warm and dry. No bruising and no rash noted. She is not diaphoretic.       Consults:   Consults (From admission, onward)        Status Ordering Provider     Case Management/  Once     Provider:  (Not yet assigned)    Acknowledged ARMANDO HENRY     Inpatient consult to Critical Care Medicine  Once     Provider:  (Not yet assigned)    Completed PEEWEE MCKAY     Inpatient consult to General Surgery  Once     Provider:  (Not yet assigned)    Completed PEEWEE MCKAY     Inpatient consult to Infectious Diseases  Once     Provider:  (Not yet assigned)    Completed PEEWEE MCKAY     Inpatient consult to Neurology  Once     Provider:  (Not yet assigned)    Completed PEEWEE MCKAY     Inpatient consult to Palliative Care  Once     Provider:  (Not yet assigned)    Completed ELIAN ORDONEZ     Inpatient consult to Registered Dietitian/Nutritionist  Once     Provider:  (Not yet assigned)    Completed ARMANDO HENRY          Final Active Diagnoses:    Diagnosis Date Noted POA    PRINCIPAL PROBLEM:  Fever [R50.9] 12/05/2018 Yes    Hyperkalemia [E87.5] 12/12/2018 Unknown    Aspiration pneumonia [J69.0] 12/12/2018 Unknown    Urinary tract infection associated with catheterization of urinary tract [T83.511A, N39.0] 12/12/2018 Unknown     Serotonin syndrome, presumed [G25.79] 12/08/2018 Unknown    Generalized hyperreflexia [R29.2] 12/08/2018 Unknown    Essential hypertension [I10] 12/06/2018 Yes    HLD (hyperlipidemia) [E78.5] 12/06/2018 Yes    GERD (gastroesophageal reflux disease) [K21.9] 12/06/2018 Yes    Dementia without behavioral disturbance [F03.90] 12/06/2018 Yes    Atrial fibrillation [I48.91] 12/06/2018 Yes    Hypothyroidism [E03.9] 12/06/2018 Yes    Dysphagia [R13.10] 12/06/2018 Yes    Hypernatremia [E87.0] 12/06/2018 Yes      Problems Resolved During this Admission:       Discharged Condition: good      Patient Instructions:   No discharge procedures on file.        Pending Diagnostic Studies:     None         Medications:  Reconciled Home Medications:      Medication List      START taking these medications    metoprolol tartrate 25 MG tablet  Commonly known as:  LOPRESSOR  Please take 1/4 tablet (6.25 mg) PO BID        CONTINUE taking these medications    aspirin 81 MG Chew  Take 81 mg by mouth once daily.     atorvastatin tablet  Commonly known as:  LIPITOR  Take 5 mg by mouth once daily.     donepezil 10 MG tablet  Commonly known as:  ARICEPT  Take 10 mg by mouth every evening.     levothyroxine 25 MCG tablet  Commonly known as:  SYNTHROID  Take 25 mcg by mouth once daily.     ONE DAILY MULTIVITAMIN per tablet  Generic drug:  multivitamin  Take 1 tablet by mouth once daily.     pantoprazole 40 MG tablet  Commonly known as:  PROTONIX  Take 40 mg by mouth once daily.        STOP taking these medications    bisoprolol 5 MG tablet  Commonly known as:  ZEBETA     clomiPRAMINE 50 mg capsule  Commonly known as:  ANAFRANIL     clonazePAM 1 MG tablet  Commonly known as:  KLONOPIN     mirtazapine 30 MG tablet  Commonly known as:  REMERON     risperiDONE 1 MG tablet  Commonly known as:  RISPERDAL     rivastigmine 9.5 mg/24 hr Pt24  Commonly known as:  EXELON     VITAMIN C 500 MG tablet  Generic drug:  ascorbic acid (vitamin C)             Indwelling Lines/Drains at time of discharge:   Lines/Drains/Airways     Peripherally Inserted Central Catheter Line                 PICC Triple Lumen 12/06/18 1440 right brachial 10 days          Drain                 Ileostomy -- days         Urethral Catheter 12/06/18 2100 10 days         NG/OG Tube 12/08/18 0514 [place] sump;nasogastric 14 Fr. Left nostril 8 days                Time spent on the discharge of patient: 30 minutes  Patient was seen and examined on the date of discharge and determined to be suitable for discharge.         Juan Manuel Daniels MD  Department of Hospital Medicine  [facility]

## 2018-12-17 NOTE — PLAN OF CARE
Hospital Day # 11. [facility] refused pt. back. Dispo : back to NH when medically stable.        12/17/18 0802   Discharge Reassessment   Assessment Type Discharge Planning Reassessment   Discharge Plan A Return to nursing home   Discharge Plan B Return to Nursing Home   Describe the patient's ability to walk at the present time. Major restrictions/daily assistance from another person   How often would a person be available to care for the patient? Whenever needed   Number of comorbid conditions (as recorded on the chart) Four

## 2018-12-17 NOTE — PLAN OF CARE
SW faxed NH referral to [facility] where the patient was previously residing before inpatient stay via . SW will continue to follow.      12/17/18 5315   Post-Acute Status   Post-Acute Placement Status Referrals Sent     Saray Ennis LMSW   - [facility]  [phone]

## 2018-12-17 NOTE — PLAN OF CARE
Problem: Patient Care Overview  Goal: Plan of Care Review  Outcome: Ongoing (interventions implemented as appropriate)  Pt disoriented x3, afebrile, free of falls. Pt remains bedrest, turn q2h. Denies pain/distress. Intermittently able to converse with staff overnight. Hoover care provided. NGT remains in place with tube feeds at 40cc/hr, tolerating well. Feeds held this morning for levothyroxine administration; will resume as ordered. Water bolus given as ordered. WCTM.

## 2018-12-17 NOTE — PROGRESS NOTES
"  [facility]  Adult Nutrition  Consult Note    SUMMARY     Recommendations    1. Continue current TF regimen of Isosource 1.5 @ 40 mL/hr.    - Hold for residuals >400 mL.  2. RD to monitor & follow-up.    Goals: Meet % EEN, EPN  Nutrition Goal Status: goal met  Communication of RD Recs: reviewed with RN    Reason for Assessment    Reason For Assessment: RD follow-up  Diagnosis: other (see comments)(Fever)  Relevant Medical History: HTN, HLD, Dementia, Multiple abdominal surgeries, failed PEG tube placement  Interdisciplinary Rounds: attended    General Information Comments: Pt remains NPO, per ST recommendations. Tolerating current TF regimen via NGT. Pt aphasic.  Nutrition Discharge Planning: Adequate nutrition    Nutrition/Diet History    Patient Reported Diet/Restrictions/Preferences: other (see comments)(YUN)  Spiritual, Cultural Beliefs, Episcopal Practices, Values that Affect Care: no  Factors Affecting Nutritional Intake: NPO    Anthropometrics    Temp: 98 °F (36.7 °C)  Height Method: Stated  Height: 5' 3" (160 cm)  Height (inches): 63 in  Weight Method: Bed Scale  Weight: 53.2 kg (117 lb 4.6 oz)  Weight (lb): 117.29 lb  Ideal Body Weight (IBW), Female: 115 lb  % Ideal Body Weight, Female (lb): 101.99 lb  BMI (Calculated): 20.8  BMI Grade: 18.5-24.9 - normal  Usual Body Weight (UBW), kg: (YUN)    Lab/Procedures/Meds    Pertinent Labs Reviewed: reviewed  Pertinent Labs Comments: Gluc 113  Pertinent Medications Reviewed: reviewed  Pertinent Medications Comments: -    Physical Findings/Assessment    (RETIRED) Overall Physical Appearance: listlessness, underweight, weak  (RETIRED) Tubes: other (see comments)(Colostomy)  RETIRED Oral/Mouth Cavity: WDL  (RETIRED) Skin: intact    Estimated/Assessed Needs    Weight Used For Calorie Calculations: 53.2 kg (117 lb 4.6 oz)     Energy Calorie Requirements (kcal): 1353 kcal/d  Energy Need Method: [place](1.25 PAL)     Protein Requirements: " 64-75 g/d (1.2-1.4 g/kg)  Weight Used For Protein Calculations: 53.2 kg (117 lb 4.6 oz)     Estimated Fluid Requirement Method: other (see comments)(Per MD or 1 mL/kcal)     Nutrition Prescription Ordered    Current Diet Order: NPO  Current Nutrition Support Formula Ordered: Isosource 1.5  Current Nutrition Support Rate Ordered: 40 mL/hr    Evaluation of Received Nutrient/Fluid Intake    Enteral Calories (kcal): 1440  Enteral Protein (gm): 65  Enteral (Free Water) Fluid (mL): 733    Free Water Flush Fluid (mL): 1600    Energy Calories Required: meeting needs (106%)  Protein Required: meeting needs (100%)  Fluid Required: meeting needs    Comments: LBM: 12/17    Tolerance: tolerating    Nutrition Risk    Level of Risk/Frequency of Follow-up: moderate     Assessment and Plan    Nutrition Problem  Inadequate energy intake     Related to (etiology):   Inability to consume sufficient energy     Signs and Symptoms (as evidenced by):   NPO with no alternate means of nutrition      Nutrition Diagnosis Status:   Resolved     Monitor and Evaluation    Food and Nutrient Intake: enteral nutrition intake  Food and Nutrient Adminstration: enteral and parenteral nutrition administration  Physical Activity and Function: nutrition-related ADLs and IADLs  Anthropometric Measurements: weight, weight change  Biochemical Data, Medical Tests and Procedures: lipid profile, glucose/endocrine profile, inflammatory profile, gastrointestinal profile, electrolyte and renal panel  Nutrition-Focused Physical Findings: overall appearance     Nutrition Follow-Up    RD Follow-up?: Yes

## 2018-12-18 VITALS
HEIGHT: 63 IN | HEART RATE: 116 BPM | DIASTOLIC BLOOD PRESSURE: 77 MMHG | SYSTOLIC BLOOD PRESSURE: 136 MMHG | TEMPERATURE: 96 F | RESPIRATION RATE: 18 BRPM | OXYGEN SATURATION: 93 % | BODY MASS INDEX: 21.91 KG/M2 | WEIGHT: 123.69 LBS

## 2018-12-18 LAB
ANION GAP SERPL CALC-SCNC: 12 MMOL/L
BASOPHILS # BLD AUTO: 0.09 K/UL
BASOPHILS NFR BLD: 0.7 %
BUN SERPL-MCNC: 14 MG/DL
CALCIUM SERPL-MCNC: 10.1 MG/DL
CHLORIDE SERPL-SCNC: 99 MMOL/L
CO2 SERPL-SCNC: 27 MMOL/L
CREAT SERPL-MCNC: 0.8 MG/DL
DIFFERENTIAL METHOD: ABNORMAL
EOSINOPHIL # BLD AUTO: 0.2 K/UL
EOSINOPHIL NFR BLD: 1.2 %
ERYTHROCYTE [DISTWIDTH] IN BLOOD BY AUTOMATED COUNT: 19.4 %
EST. GFR  (AFRICAN AMERICAN): >60 ML/MIN/1.73 M^2
EST. GFR  (NON AFRICAN AMERICAN): >60 ML/MIN/1.73 M^2
GLUCOSE SERPL-MCNC: 111 MG/DL
HCT VFR BLD AUTO: 31.7 %
HGB BLD-MCNC: 10.1 G/DL
IMM GRANULOCYTES # BLD AUTO: 0.28 K/UL
IMM GRANULOCYTES NFR BLD AUTO: 2.2 %
LYMPHOCYTES # BLD AUTO: 3.8 K/UL
LYMPHOCYTES NFR BLD: 29.1 %
MAGNESIUM SERPL-MCNC: 1.8 MG/DL
MCH RBC QN AUTO: 31 PG
MCHC RBC AUTO-ENTMCNC: 31.9 G/DL
MCV RBC AUTO: 97 FL
MONOCYTES # BLD AUTO: 1.2 K/UL
MONOCYTES NFR BLD: 8.9 %
NEUTROPHILS # BLD AUTO: 7.5 K/UL
NEUTROPHILS NFR BLD: 57.9 %
NRBC BLD-RTO: 0 /100 WBC
PHOSPHATE SERPL-MCNC: 4.1 MG/DL
PLATELET # BLD AUTO: 499 K/UL
PMV BLD AUTO: 11.6 FL
POTASSIUM SERPL-SCNC: 4.2 MMOL/L
RBC # BLD AUTO: 3.26 M/UL
SODIUM SERPL-SCNC: 138 MMOL/L
WBC # BLD AUTO: 12.92 K/UL

## 2018-12-18 PROCEDURE — 85025 COMPLETE CBC W/AUTO DIFF WBC: CPT

## 2018-12-18 PROCEDURE — 92526 ORAL FUNCTION THERAPY: CPT

## 2018-12-18 PROCEDURE — 25000003 PHARM REV CODE 250: Performed by: STUDENT IN AN ORGANIZED HEALTH CARE EDUCATION/TRAINING PROGRAM

## 2018-12-18 PROCEDURE — 36415 COLL VENOUS BLD VENIPUNCTURE: CPT

## 2018-12-18 PROCEDURE — 83735 ASSAY OF MAGNESIUM: CPT

## 2018-12-18 PROCEDURE — 80048 BASIC METABOLIC PNL TOTAL CA: CPT

## 2018-12-18 PROCEDURE — 84100 ASSAY OF PHOSPHORUS: CPT

## 2018-12-18 RX ORDER — LEVOTHYROXINE SODIUM 25 UG/1
25 TABLET ORAL
Status: DISCONTINUED | OUTPATIENT
Start: 2018-12-19 | End: 2018-12-18 | Stop reason: HOSPADM

## 2018-12-18 RX ORDER — DONEPEZIL HYDROCHLORIDE 5 MG/1
10 TABLET, FILM COATED ORAL NIGHTLY
Status: DISCONTINUED | OUTPATIENT
Start: 2018-12-18 | End: 2018-12-18 | Stop reason: HOSPADM

## 2018-12-18 RX ORDER — NAPROXEN SODIUM 220 MG/1
81 TABLET, FILM COATED ORAL DAILY
Status: DISCONTINUED | OUTPATIENT
Start: 2018-12-19 | End: 2018-12-18 | Stop reason: HOSPADM

## 2018-12-18 RX ORDER — ACETAMINOPHEN 650 MG/20.3ML
650 LIQUID ORAL EVERY 4 HOURS PRN
Status: DISCONTINUED | OUTPATIENT
Start: 2018-12-18 | End: 2018-12-18 | Stop reason: HOSPADM

## 2018-12-18 RX ADMIN — LEVOTHYROXINE SODIUM 25 MCG: 25 TABLET ORAL at 05:12

## 2018-12-18 RX ADMIN — Medication 6.25 MG: at 09:12

## 2018-12-18 RX ADMIN — ASPIRIN 81 MG CHEWABLE TABLET 81 MG: 81 TABLET CHEWABLE at 09:12

## 2018-12-18 NOTE — PLAN OF CARE
D/C orders noted. Pt. will transfer back to [facility] in [facility]. Follow up care to be provided there.        12/18/18 1019   Final Note   Assessment Type Final Discharge Note   Anticipated Discharge Disposition Int Care [facility]   Hospital Follow Up  Appt(s) scheduled? No   Discharge plans and expectations educations in teach back method with documentation complete? No   Right Care Referral Info   Post Acute Recommendation Other   Referral Type Nursing Home   Fac[place]

## 2018-12-18 NOTE — PT/OT/SLP PROGRESS
Speech Language Pathology Treatment & Discharge Summary    Patient Name:  Silvia Ornelas   MRN:  [id number]  Admitting Diagnosis: Fever    Recommendations:                 General Recommendations:  Dysphagia therapy  Diet recommendations:  Puree, Liquid Diet Level: Nectar Thick Aspiration Precautions:  1 bite/sip at a time, Alternating bites/sips, Assistance with meals to ensure following precautions & strategies, Feed only when awake/alert, HOB to 90 degrees, Meds crushed in puree, Monitor for s/s of aspiration, No straws, Small bites/sips and Strict aspiration precautions   Assistance for thickening liquids  +To achieve nectar-thickened liquids, please use 6 oz of any liquid to 1 packet of thickener. Note: No jello, ice cream or ice.   ++Continue to monitor for signs and symptoms of aspiration and discontinue oral feeding should you notice any of the following: watery eyes, reddened facial area, wet vocal quality, increased work of breathing, change in respiratory status, increased congestion, coughing, fever, etc.  General Precautions: Standard, aspiration, pureed diet, nectar thick, fall  Communication strategies:  go to room if call light pushed    Subjective     SLP reviewed Pt with nurse, nurse reported Pt with minimal appetite   Pt presents lethargic  SLP not able to elicit spontaneous speech  No family present at bedside    Pain/Comfort:  · Pain Rating 1: 0/10    Objective:     Has the patient been evaluated by SLP for swallowing?   Yes  Keep patient NPO? No   Current Respiratory Status: room air      Pt seen for dysphagia therapy and ongoing education following MBSS (12/17.)  Pt found asleep in bed upon first attempt. Patient found asleep in bed upon second attempt with untouched lunch meal tray at bedside. She woke per simple verbal and tactile cues, then quickly closed eyes again. SLP educated Pt on SLP role and diet recommendations following MBSS. Pt with minimal sustained attention and continued to close  eyes despite SLP attempts to redirect/rewake patient with moderate verbal and tactile cues.  SLP provided encouragement and offered to help Patient with lunch meal tray, elevated bed, and continued to redirect Pt to ST task.  Pt declined lunch meal tray across SLP attempts to redirect and rewake patient.  HOB reclined.  Whiteboard updated. No questions noted.  Nurse notified following session.  SLP explained she would re-attempt later service day, nurse informed Ptt awaiting transport for d/c.      Assessment:     Silvia Ornelas is a 68 y.o. female with an SLP diagnosis of mild oropharyngeal dysphagia .  She presents with increased lethargy today and minimal appetite.     Goals:   Multidisciplinary Problems     SLP Goals        Problem: SLP Goal    Goal Priority Disciplines Outcome   SLP Goal   (Resolved)     SLP Resolved for Upgrade   Description:  Speech Language Pathology Goals  Goals expected to be met by 12/13:  1. Patient will participate in ongoing swallow assessment to determine least restrictive diet recommendations.              Problem: SLP Goal    Goal Priority Disciplines Outcome   SLP Goal     SLP Ongoing (interventions implemented as appropriate)   Description:  Speech Language Pathology Goals  Goals expected to be met by 12/24  1. Pt will tolerate puree & nectar thick liquids without s/s aspiration  2. Ongoing swallow assessment to determine least restrictive PO consistencies  3. Dysphagia exercises given min to mod A              Multidisciplinary Problems (Resolved)        Problem: SLP Goal    Goal Priority Disciplines Outcome   SLP Goal   (Resolved)     SLP Resolved for Upgrade                   Plan:     · Patient to be seen:  5 x/week   · Plan of Care expires:  01/06/18  · Plan of Care reviewed with:  patient   · SLP Follow-Up:  Yes       Discharge recommendations:  (tbd)     Time Tracking:     SLP Treatment Date:   12/18/18  Speech Start Time:  1236  Speech Stop Time:  1244     Speech Total  Time (min):  8 min    Billable Minutes: Treatment Swallowing Dysfunction 8    ROBB Gill., CCC-SLP  Speech-Language Pathology  Pager: [phone]      12/18/2018

## 2018-12-18 NOTE — PLAN OF CARE
Problem: SLP Goal  Goal: SLP Goal  Speech Language Pathology Goals  Goals expected to be met by 12/24  1. Pt will tolerate puree & nectar thick liquids without s/s aspiration  2. Ongoing swallow assessment to determine least restrictive PO consistencies  3. Dysphagia exercises given min to mod A   Outcome: Ongoing (interventions implemented as appropriate)  Pt with minimal eye contact with ST. She declined SLP attempts to assist with lunch meal tray. Findings reviewed with nurse.     ROBB Gill., [facility]-SLP  Speech-Language Pathology  Pager: [phone]  12/18/2018

## 2018-12-18 NOTE — PLAN OF CARE
GRACIE scheduled d/c transportation to [facility] - [address] [place] [id number] through [facility]. Patient is scheduled to be picked up at 10:00 am. GRACIE provided patient's nurse with report number [phone] to call and ask for Tina or Emily.      12/18/18 0923   Post-Acute Status   Post-Acute Authorization Placement   Post-Acute Placement Status Authorization Obtained     Saray Ennis LMSW   - [facility]  [phone]

## 2018-12-18 NOTE — CARE UPDATE
MsWaldemar Ornelas had pulled her NGT out. She have passed the barium swallow, and speech recs were to start the pt on puree thick feeds. Pt is minimally verbal. I have personally seen the pt and assessed her status.   - She is for discharge tomorrow to SNF  - Since NGT will be taken out anyway for the discharge, pt will be started on puree thick nectar diet without replacing the NGT  - If any issues happened overnight, or trouble taking meds, I will reassess the pt again for placing NGT

## 2018-12-18 NOTE — PLAN OF CARE
Problem: Patient Care Overview  Goal: Plan of Care Review  Outcome: Ongoing (interventions implemented as appropriate)  Pt AAOx1, afebrile, free of falls. Pt pulled NGT prior to night shift. MD at bedside at start of shift, ordered to not replace at this time. Diet started. PO medications administered with applesauce, pt tolerated well. No coughing/distress noted. Pt confirmed success. Pt repositioned q2h with pillows and wedge support. Heel boots remain in place. Hoover care provided. Denies pain/distress. Rested most of shift. Discharge to NH later today. WCTM.

## 2018-12-20 NOTE — PHYSICIAN QUERY
PT Name: Silvia Ornelas  MR #: [id number]     Physician Query Form - Diagnosis Clarification      CDS/: KAYLYN Combs, RN, CCDS               Contact information: [email or web address]    This form is a permanent document in the medical record.     Query Date: December 20, 2018    By submitting this query, we are merely seeking further clarification of documentation.  Please utilize your independent clinical judgment when addressing the question(s) below.     The medical record contains the following:      Findings Supporting Clinical Information Location in Medical Record                             Urinary tract infection associated with catheterization of urinary tract    CANDIDA GLABRATA     Overnight on 12/4, she had resumption of fevers (Tmax 101-102)   Blood and urine cultures repeated. Hoover catheter exchanged    on Diflucan for urine culture positive for Candida.     Fever     · Differentials here include recurrent aspiration pneumonia secondary to dysphagia, bacteremia, and UTI.     · UA unremarkable for infection              no need to treat candiduria at this time     Urine culture: 12/10    PN: 12/8                    DCS: 12/17          ID PN: 12/8     Please clarify if the _____Urinary tract infection______________________ diagnosis has been:    [ x ] Ruled In   [  ] Ruled In, Now Resolved   [  ] Ruled Out   [  ] Other/Clarification of findings (please specify):   [  ] Clinically insignificant     [  ] Clinically undetermined     Please document in your progress notes daily for the duration of treatment, until resolved, and include in your discharge summary.

## 2018-12-20 NOTE — PHYSICIAN QUERY
PT Name: Silvia Ornelas  MR #: [id number]     Physician Query Form - Etiology of Condition Clarification      CDS/: KAYLYN Combs, RN, CCDS               Contact information: [email or web address]  This form is a permanent document in the medical record.     Query Date: December 20, 2018    By submitting this query, we are merely seeking further clarification of documentation.  Please utilize your independent clinical judgment when addressing the question(s) below.     The medical record contains the following:    Findings Supporting Clinical Information Location in Medical Record     Fever    hospitalized at [facility] on 11/10/18 for suspected aspiration pneumonia; status post treatment with Vanc/Zosyn    presented again on 11/18/18 for new onset of nausea and vomiting  started on Linezolid and Cefepime for suspected bilateral aspiration pneumonia    On 11/27, she spiked a fever to 107 (rectal probe) and developed new onset of extremity rigidity, hyperreflexia, and fine tremor, associated with worsening leukocytosis to 28, and lactic of 3. She was assessed to have serotonin syndrome secondary vs NMS due to possible combination of Linezolid, Dilaudid, Fentanyl, and Zofran    Patient's antibiotics switched to vancomycin and cefepime for aspiration pneumonitis, WBC was downtrending, and both antibiotics stopped on 12/3 after completed of 6-7 day antibiotic course.    Overnight on 12/4, she had resumption of fevers (Tmax 101-102) with again features again of rigidity. Mental status change declined again with minimal  verbal response; patient noted to be ill appearing and diaphoretic    On 12/5, her Vancomycin and Cefepime restarted, also on Diflucan for urine culture positive for Candida.     Fever     · Improved.   · Differentials here include recurrent aspiration pneumonia secondary to dysphagia, bacteremia, and UTI     · CT A/P (12/7) without evidence of abdominal infection, showed expected  post-operative changes, but showed possible RLL consolidation concerning for possible aspiration pneumonia vs pneumonitis.    · ID consulted given persistent fevers without source status post multiple broad spectrum antibiotics    · Consulted neurology on 12/08 for mental status changes; considered serotonin syndrome as possible explanation for fever, vital signs, and mental status changes given medications at OSH more associated with that compared to NMS. Received cyproheptadine from 12/09-12/10 with minimal improvement.        PN: 12/16     Please document your best medical opinion regarding the etiology of _Fever_ ___ for which treatment is/was directed.     [x  ] Serotonin Syndrome  [  ] Aspiration PNA  [  ] Other reason for fever: _________________________      [  ] Clinically Undetermined     Please document in your progress notes daily for the duration of treatment, until resolved, and include in your discharge summary.

## 2018-12-20 NOTE — PHYSICIAN QUERY
PT Name: Silvia Ornelas  MR #: [id number]     Physician Query Form - Diagnosis Clarification      CDS/: KAYLYN Combs, RN, CCDS               Contact information: [email or web address]    This form is a permanent document in the medical record.     Query Date: 2018    By submitting this query, we are merely seeking further clarification of documentation.  Please utilize your independent clinical judgment when addressing the question(s) below.     The medical record contains the following:      Findings Supporting Clinical Information Location in Medical Record                                 Encephalopathy    Patient is alert on exam and appears to be at her baseline dementia.  She answers person, place, and time questions, but is inappropriate with other responses    Neuro   Dementia without behavioral disturbance     Per primary note patient has had progression in her demential over the past 2 years, worsening in the past 4 months. She was appears to be at her baseline mental status and without an Acute Encephalopathy     Abnormal EEG due to mild slowing and disorganization of the   waking background in this hospitalized patient    Results are most consistent with   a mild encephalopathy, nonspecific as to etiology.    #Encephalopathy   - declining mental status prior to hospitalization at OSH  - concern for serotonin syndrome vs NMS vs end stage dementia    remains awake, alert, but with disordered and delayed response to questioning    Dementia without behavioral disturbance      Consult:                 EE/7            PN:             DCS:      Please clarify if the _____Encephalopathy ______________________ diagnosis has been:    [x  ] Ruled In: (type): _secondary to serotonin syndrome__________________________   [  ] Ruled In, Now Resolved: (type): ______________________   [  ] Ruled Out   [  ] Other/Clarification of findings (please specify):   [  ] Clinically  insignificant     [  ] Clinically undetermined     Please document in your progress notes daily for the duration of treatment, until resolved, and include in your discharge summary.

## 2018-12-20 NOTE — PHYSICIAN QUERY
PT Name: Silvia Ornelas  MR #: [id number]     Physician Query Form - Diagnosis Clarification      CDS/: KAYLYN Combs, RN, CCDS               Contact information: [email or web address]    This form is a permanent document in the medical record.     Query Date: December 20, 2018    By submitting this query, we are merely seeking further clarification of documentation.  Please utilize your independent clinical judgment when addressing the question(s) below.     The medical record contains the following:      Findings Supporting Clinical Information Location in Medical Record       serotonin syndrome   She was given dantrolene for treatment of serotonin syndrome/NMS with reported improvement but symptoms developed again.     Today, she is alert, oriented x 2, she has increase tone in all four extremities, myoclonus in upper extremities, and hyperreflexia.   Consider cyproheptadine to see if her symptoms improved as her exam findings are concerning for serotonin syndrome     spiked a fever to 107 (rectal probe) and developed new onset of extremity rigidity, hyperreflexia, and fine tremor, associated with worsening leukocytosis to 28, and lactic of 3.     She was assessed to have serotonin syndrome secondary vs NMS due to possible combination of Linezolid, Dilaudid, Fentanyl, and Zofran. She received 140 mg of Dantrolene for possible NMS with symptom improvement; Linezolid discontinued, and ABx switched and stopped on 12/3 after completed of 6-7 day antibiotic course    CT head was unremarkable    · Started on cyproheptadine     Patient continues to improve, significantly so w/ administration of cyproheptadine. This is consistent w/ the thought that patient's presentation is due to resolving serotonin syndrome    Cyproheptadine treatment for two days. Pt began displaying more flushing and hyperthermia following cyproheptadine administration. Cyproheptadine held as a result.   - continue supportive  care    Serotonin syndrome, presumed     Neuro Consult: 12/8                                PN: 12/9    PN: 12/10      PN: 12/11        DCS: 12/17       Please clarify if the _____serotonin syndrome______________________ diagnosis has been:    [ x ] Ruled In   [  ] Ruled In, Now Resolved   [  ] Ruled Out   [  ] Other/Clarification of findings (please specify):   [  ] Clinically insignificant     [  ] Clinically undetermined     Please document in your progress notes daily for the duration of treatment, until resolved, and include in your discharge summary.

## 2019-01-07 NOTE — CLINICAL REVIEW
This patient was transferred to [address] [address].  The patient was sent to a SNF bed at the facility.  Spoke with Lucie at [facility] to clarify the type of bed.    Discharge Disposition is SNF

## 2022-09-29 NOTE — PROGRESS NOTES
[facility]  Neurology  Progress Note    Patient Name: Silvia Ornelas  MRN: [id number]  Admission Date: 12/6/2018  Hospital Length of Stay: 3 days  Code Status: Full Code   Attending Provider: Humberto Kingston MD  Primary Care Physician: Primary Doctor No   Principal Problem:Fever      Subjective:     Interval History: Patient seen and examined this morning. Seems brighter and more responsive this morning. Continues to reply appropriately to questions if asked loudly.   Does report some abdominal pain.   Spoke to daughter this AM - at baseline ambulates in a wheelchair since 2016. States that patient does walk w/ assistance of PT/OT. Able to feed herself and communicate.     Current Neurological Medications:   Cyproheptadine 4 mg TID    Current Facility-Administered Medications   Medication Dose Route Frequency Provider Last Rate Last Dose    acetaminophen oral solution 650 mg  650 mg Oral Q4H PRN Rachel Hansen MD        aspirin chewable tablet 81 mg  81 mg Per NG tube Daily Kelvin Mcadams MD   81 mg at 12/09/18 0811    bisoprolol split tablet 2.5 mg  2.5 mg Per NG tube Daily Kelvin Mcadams MD   2.5 mg at 12/09/18 0811    cyproheptadine 4 mg tablet 4 mg  4 mg Per NG tube TID Jeaneth Body MD        dextrose 50% injection 12.5 g  12.5 g Intravenous PRN Jeaneth Boyd MD        dextrose 50% injection 25 g  25 g Intravenous PRN Jeaneth Boyd MD        glucagon (human recombinant) injection 1 mg  1 mg Intramuscular PRN Jeaneth Boyd MD        glucose chewable tablet 16 g  16 g Oral PRN Jeaneth Boyd MD        glucose chewable tablet 24 g  24 g Oral PRN Jeaneth Boyd MD        heparin (porcine) injection 5,000 Units  5,000 Units Subcutaneous Q12H Humberto Kingston MD   5,000 Units at 12/09/18 0811    ondansetron disintegrating tablet 8 mg  8 mg Oral Q8H PRN Jeaneth Boyd MD        polyethylene glycol packet 17 g  17 g Oral BID PRN Jeaneth Boyd MD        sodium  chloride 0.9% flush 5 mL  5 mL Intravenous PRN Jeaneth Boyd MD           Review of Systems   Unable to perform ROS: Mental status change   Constitutional: Positive for activity change, diaphoresis and fever.   HENT: Positive for trouble swallowing.    Gastrointestinal: Negative for nausea and vomiting.   Musculoskeletal:        Stiffness/rigidity, improved from yesterday   Neurological: Positive for tremors     Objective:     Vital Signs (Most Recent):  Temp: 98.5 °F (36.9 °C) (12/09/18 1139)  Pulse: 105 (12/09/18 1139)  Resp: 18 (12/09/18 1139)  BP: (!) 99/57 (12/09/18 1139)  SpO2: (!) 93 % (12/09/18 1139) Vital Signs (24h Range):  Temp:  [97.5 °F (36.4 °C)-98.7 °F (37.1 °C)] 98.5 °F (36.9 °C)  Pulse:  [100-113] 105  Resp:  [18-20] 18  SpO2:  [93 %-99 %] 93 %  BP: ()/(57-82) 99/57     Weight: 55.3 kg (122 lb)  Body mass index is 21.61 kg/m².    Physical Exam  General:  Well-developed, well-nourished, nad, minimally responsive at first. Opens eyes on command. Verbal, limited  HEENT:  NCAT, PERRLA, EOMI, oropharyngeal membrane bloody, noted broken tooth, unclear the chronicity. Noted wound of the lower lip  Neck:  Supple, rigid, but able to move neck from side to side  Resp:  Symmetric expansion, no increased wob  CVS:  No LE edema, peripheral pulses 2+ (radial, dorsalis pedis)  GI:  Abd soft, non-distended, non-tender to palpation  Neurologic Exam:  Mental Status:  AAOx2.  Speech, scant, delayed, but able to answer few questions appropriately.  Cranial Nerves:   PERRLA, EOMI.  Facial movement, sensation intact and symmetric.  Palate raises symmetrically, tongue protrudes midline.    Motor:  Normal bulk and increased tone.  Moves all extremities on command, difficult to assess strength but she is antigravity throughout. Some contractures noted in b/l hands and feet  Sensory:  Intact to light touch at all extremities and face without inattention.    Reflexes:  Biceps, brachioradialis, patellar, Achilles 3+  There are no Wet Read(s) to document. and symmetric.  No ankle clonus, although hard to assess due to rigidity Coordination: Fine tremor notes on arm extension.  Gait:  Deferred            Significant Labs:   Hemoglobin A1c:   Recent Labs   Lab 12/06/18  1255   HGBA1C 5.6     Blood Culture: No results for input(s): LABBLOO in the last 48 hours.  BMP:   Recent Labs   Lab 12/08/18  0347 12/08/18  1203 12/08/18 2007 12/09/18  0528 12/09/18  0656   GLU 98   < > 142* 77  --  103   *   < > 145 145  --  143   K 5.0   < > 4.4 4.5  --  4.8   *   < > 107 107  --  104   CO2 24   < > 26 22*  --  24   BUN 13   < > 12 11  --  12   CREATININE 0.8   < > 0.8 0.8  --  0.8   CALCIUM 8.7   < > 8.7 8.6*  --  8.6*   MG 1.5*  --   --   --  2.0  --     < > = values in this interval not displayed.     CBC:   Recent Labs   Lab 12/08/18 0347 12/09/18  0528   WBC 9.95 11.22   HGB 8.0* 8.3*   HCT 25.3* 26.9*    378*     CMP:   Recent Labs   Lab 12/08/18  0347  12/08/18  1203 12/08/18 2007 12/09/18  0528 12/09/18  0656   GLU 98   < > 142* 77  --  103   *   < > 145 145  --  143   K 5.0   < > 4.4 4.5  --  4.8   *   < > 107 107  --  104   CO2 24   < > 26 22*  --  24   BUN 13   < > 12 11  --  12   CREATININE 0.8   < > 0.8 0.8  --  0.8   CALCIUM 8.7   < > 8.7 8.6*  --  8.6*   MG 1.5*  --   --   --  2.0  --    ANIONGAP 11   < > 12 16  --  15   EGFRNONAA >60.0   < > >60.0 >60.0  --  >60.0    < > = values in this interval not displayed.     CSF Culture: No results for input(s): CSFCULTURE in the last 48 hours.  CSF Studies: No results for input(s): ALIQUT, APPEARCSF, COLORCSF, CSFWBC, CSFRBC, GLUCCSF, LDHCSF, PROTEINCSF, VDRLCSF in the last 48 hours.  Inflammatory Markers: No results for input(s): SEDRATE, CRP, PROCAL in the last 48 hours.  POCT Glucose: No results for input(s): POCTGLUCOSE in the last 24 hours.  Prealbumin: No results for input(s): PREALBUMIN in the last 48 hours.  Respiratory Culture: No results for input(s): GSRESP, RESPIRATORYC in the  last 48 hours.  Urine Culture: No results for input(s): LABURIN in the last 48 hours.  Urine Studies: No results for input(s): COLORU, APPEARANCEUA, PHUR, SPECGRAV, PROTEINUA, GLUCUA, KETONESU, BILIRUBINUA, OCCULTUA, NITRITE, UROBILINOGEN, LEUKOCYTESUR, RBCUA, WBCUA, BACTERIA, SQUAMEPITHEL, HYALINECASTS in the last 48 hours.    Invalid input(s): WRIGHTSUR  All pertinent lab results from the past 24 hours have been reviewed.    Significant Imaging: I have reviewed all pertinent imaging results/findings within the past 24 hours.   MRI  The brain parenchyma appears normal.  No mass lesion, acute hemorrhage, edema or acute infarct.  No abnormal enhancement.  Ventricles and sulci are normal in size for age without evidence of hydrocephalus.  No extra-axial blood or fluid collections.  Normal vascular flow voids are preserved.  Skull/extracranial contents (limited evaluation): Bone marrow signal intensity is normal.     EEG  Pending final read; no evidence of NCSE or other epileptiform activity/seziures upon independent review       Assessment and Plan:     * Fever    Patient is a 67 yo female w/ past complicated medical history presents as a transfer from OSH w/ primary diagnosis of fever w/ culture negative workup ,rigidity, AMS w/ decreased responsiveness, hyperreflexia. Per records, patient was given Linezolid, Zofran and Fentanyl at one point of time at the OSH and had developed rapid onset above symptoms. From the notes, patient was presumed to have NMS vs SS and the offending agents were stopped as well as patient was give Dantrolene 140 mg - patient did show improvement in her muscle rigidity, which is expected.   She later developed similar symptoms on 12/04, and although her MAR was evaluated for any other offending agents, none were found. She was transferred here on 12/06 for further work- up of the above symptoms.   Infectious work-up has been unremarkable.     Patient has shown some improvement today, less  rigid. Communicated to the primary team regarding Cyproheptadine recommendations as well as close monitoring of BP/avoiding for hypotension.     - Patient remains hyperreflexic, rigid w/ increased tone throughout - IMPROVED TODAY  - Currently concerned for SS, as the above mentioned agents ( Zofran, Linezolid and Fentanyl) have all been associated with the development of this syndrome. Dantrolene would show improvement in patient's rigidity but would not be an appropriate treatment for SS.  - CPK ordered - WNL now; but patient has been receiving IVF etc  - No mention of any neuroleptic medication on patient's OSH records  - Could consider cyproheptadine as the treatment modality for SS; monitor for hypotension   - EEG ordered - upon review with staff no evidence of NCSE or diffuse cortical dysfunction; final read pending  - MRI w/o any abnormalities   - Will continue to follow the patient; appreciate consult, please contact w/ any additional questions at this time      Generalized hyperreflexia    - See principal problem      Sustained increased muscle tone    - See principal problem      Dementia without behavioral disturbance    - Unclear baseline  - Patient although verbal, unable to provide history  - No family at bedside at this time          VTE Risk Mitigation (From admission, onward)        Ordered     heparin (porcine) injection 5,000 Units  Every 12 hours      12/07/18 1400     Place sequential compression device  Until discontinued      12/06/18 1138     IP VTE LOW RISK PATIENT  Once      12/06/18 1138          Ivette Peterson MD  Neurology  [facility]

## 2024-08-08 NOTE — ASSESSMENT & PLAN NOTE
· Controlled.   · Holding home BP medications as she is hypotensive  · Watch BP with cyproheptadine   · Vitals ordered Q4H.      chest

## (undated) DEVICE — GAUZE SPONGE 4X4 12PLY

## (undated) DEVICE — SEE MEDLINE ITEM 146420

## (undated) DEVICE — LUBRICANT SURGILUBE 2 OZ

## (undated) DEVICE — TAPE ADH MEDIPORE 4 X 10YDS

## (undated) DEVICE — DRESSING GAUZE 6PLY 4X4

## (undated) DEVICE — SEE L#95700

## (undated) DEVICE — BINDER ABDOMINAL 9 46-62

## (undated) DEVICE — SEE MEDLINE ITEM 146313

## (undated) DEVICE — SEE MEDLINE ITEM 152487

## (undated) DEVICE — KIT PEG PULL STANDARD 20F

## (undated) DEVICE — TRAY MINOR GEN SURG